# Patient Record
Sex: MALE | Race: WHITE | NOT HISPANIC OR LATINO | Employment: FULL TIME | URBAN - METROPOLITAN AREA
[De-identification: names, ages, dates, MRNs, and addresses within clinical notes are randomized per-mention and may not be internally consistent; named-entity substitution may affect disease eponyms.]

---

## 2023-01-06 ENCOUNTER — TELEPHONE (OUTPATIENT)
Dept: FAMILY MEDICINE | Facility: CLINIC | Age: 63
End: 2023-01-06
Payer: COMMERCIAL

## 2023-01-06 NOTE — TELEPHONE ENCOUNTER
----- Message from Dagmar Hodge LPN sent at 1/5/2023 12:45 PM CST -----  Contact: Patient's Spouse    ----- Message -----  From: Sybil Acuna  Sent: 1/4/2023   9:24 AM CST  To: [facility] Staff    Type:  Sooner Appointment Request    Caller is requesting a sooner appointment.  Caller declined first available appointment listed below.  Caller will not accept being placed on the waitlist and is requesting a message be sent to doctor.    Name of Caller: Patient  When is the first available appointment? N/a  Symptoms: n/a  Best Call Back Number: [phone]   Additional Information: Please contact patient to schedule a New Patient appointment for patient and Spouse  MRN# [id number]- Trang

## 2023-01-11 ENCOUNTER — OFFICE VISIT (OUTPATIENT)
Dept: FAMILY MEDICINE | Facility: CLINIC | Age: 63
End: 2023-01-11
Payer: COMMERCIAL

## 2023-01-11 VITALS
HEART RATE: 86 BPM | RESPIRATION RATE: 18 BRPM | WEIGHT: 200.06 LBS | DIASTOLIC BLOOD PRESSURE: 72 MMHG | HEIGHT: 70 IN | BODY MASS INDEX: 28.64 KG/M2 | OXYGEN SATURATION: 96 % | SYSTOLIC BLOOD PRESSURE: 124 MMHG

## 2023-01-11 DIAGNOSIS — N40.1 BPH ASSOCIATED WITH NOCTURIA: ICD-10-CM

## 2023-01-11 DIAGNOSIS — Z12.83 SKIN CANCER SCREENING: ICD-10-CM

## 2023-01-11 DIAGNOSIS — Z86.19 HISTORY OF HELICOBACTER PYLORI INFECTION: ICD-10-CM

## 2023-01-11 DIAGNOSIS — Z12.11 ENCOUNTER FOR SCREENING COLONOSCOPY: ICD-10-CM

## 2023-01-11 DIAGNOSIS — Z13.1 ENCOUNTER FOR SCREENING FOR DIABETES MELLITUS: ICD-10-CM

## 2023-01-11 DIAGNOSIS — Z11.4 ENCOUNTER FOR SCREENING FOR HUMAN IMMUNODEFICIENCY VIRUS (HIV): ICD-10-CM

## 2023-01-11 DIAGNOSIS — N52.9 ERECTILE DYSFUNCTION, UNSPECIFIED ERECTILE DYSFUNCTION TYPE: ICD-10-CM

## 2023-01-11 DIAGNOSIS — R35.1 BPH ASSOCIATED WITH NOCTURIA: ICD-10-CM

## 2023-01-11 DIAGNOSIS — Z00.00 PREVENTATIVE HEALTH CARE: Primary | ICD-10-CM

## 2023-01-11 DIAGNOSIS — Z11.59 ENCOUNTER FOR HEPATITIS C SCREENING TEST FOR LOW RISK PATIENT: ICD-10-CM

## 2023-01-11 DIAGNOSIS — Z98.890 HISTORY OF SINUS SURGERY: ICD-10-CM

## 2023-01-11 DIAGNOSIS — E78.2 MIXED HYPERLIPIDEMIA: ICD-10-CM

## 2023-01-11 PROCEDURE — 99204 PR OFFICE/OUTPT VISIT, NEW, LEVL IV, 45-59 MIN: ICD-10-PCS | Mod: S$GLB,,, | Performed by: STUDENT IN AN ORGANIZED HEALTH CARE EDUCATION/TRAINING PROGRAM

## 2023-01-11 PROCEDURE — 1160F PR REVIEW ALL MEDS BY PRESCRIBER/CLIN PHARMACIST DOCUMENTED: ICD-10-PCS | Mod: CPTII,S$GLB,, | Performed by: STUDENT IN AN ORGANIZED HEALTH CARE EDUCATION/TRAINING PROGRAM

## 2023-01-11 PROCEDURE — 1159F MED LIST DOCD IN RCRD: CPT | Mod: CPTII,S$GLB,, | Performed by: STUDENT IN AN ORGANIZED HEALTH CARE EDUCATION/TRAINING PROGRAM

## 2023-01-11 PROCEDURE — 3008F PR BODY MASS INDEX (BMI) DOCUMENTED: ICD-10-PCS | Mod: CPTII,S$GLB,, | Performed by: STUDENT IN AN ORGANIZED HEALTH CARE EDUCATION/TRAINING PROGRAM

## 2023-01-11 PROCEDURE — 1159F PR MEDICATION LIST DOCUMENTED IN MEDICAL RECORD: ICD-10-PCS | Mod: CPTII,S$GLB,, | Performed by: STUDENT IN AN ORGANIZED HEALTH CARE EDUCATION/TRAINING PROGRAM

## 2023-01-11 PROCEDURE — 99999 PR PBB SHADOW E&M-EST. PATIENT-LVL IV: ICD-10-PCS | Mod: PBBFAC,,, | Performed by: STUDENT IN AN ORGANIZED HEALTH CARE EDUCATION/TRAINING PROGRAM

## 2023-01-11 PROCEDURE — 1160F RVW MEDS BY RX/DR IN RCRD: CPT | Mod: CPTII,S$GLB,, | Performed by: STUDENT IN AN ORGANIZED HEALTH CARE EDUCATION/TRAINING PROGRAM

## 2023-01-11 PROCEDURE — 3078F DIAST BP <80 MM HG: CPT | Mod: CPTII,S$GLB,, | Performed by: STUDENT IN AN ORGANIZED HEALTH CARE EDUCATION/TRAINING PROGRAM

## 2023-01-11 PROCEDURE — 3008F BODY MASS INDEX DOCD: CPT | Mod: CPTII,S$GLB,, | Performed by: STUDENT IN AN ORGANIZED HEALTH CARE EDUCATION/TRAINING PROGRAM

## 2023-01-11 PROCEDURE — 3074F SYST BP LT 130 MM HG: CPT | Mod: CPTII,S$GLB,, | Performed by: STUDENT IN AN ORGANIZED HEALTH CARE EDUCATION/TRAINING PROGRAM

## 2023-01-11 PROCEDURE — 3078F PR MOST RECENT DIASTOLIC BLOOD PRESSURE < 80 MM HG: ICD-10-PCS | Mod: CPTII,S$GLB,, | Performed by: STUDENT IN AN ORGANIZED HEALTH CARE EDUCATION/TRAINING PROGRAM

## 2023-01-11 PROCEDURE — 3074F PR MOST RECENT SYSTOLIC BLOOD PRESSURE < 130 MM HG: ICD-10-PCS | Mod: CPTII,S$GLB,, | Performed by: STUDENT IN AN ORGANIZED HEALTH CARE EDUCATION/TRAINING PROGRAM

## 2023-01-11 PROCEDURE — 99999 PR PBB SHADOW E&M-EST. PATIENT-LVL IV: CPT | Mod: PBBFAC,,, | Performed by: STUDENT IN AN ORGANIZED HEALTH CARE EDUCATION/TRAINING PROGRAM

## 2023-01-11 PROCEDURE — 99204 OFFICE O/P NEW MOD 45 MIN: CPT | Mod: S$GLB,,, | Performed by: STUDENT IN AN ORGANIZED HEALTH CARE EDUCATION/TRAINING PROGRAM

## 2023-01-11 RX ORDER — TADALAFIL 5 MG/1
5 TABLET ORAL DAILY PRN
Qty: 90 TABLET | Refills: 0 | Status: SHIPPED | OUTPATIENT
Start: 2023-01-11 | End: 2023-04-11

## 2023-01-11 RX ORDER — PANTOPRAZOLE SODIUM 40 MG/1
40 TABLET, DELAYED RELEASE ORAL DAILY
Qty: 90 TABLET | Refills: 3 | Status: SHIPPED | OUTPATIENT
Start: 2023-01-11 | End: 2023-03-07 | Stop reason: ALTCHOICE

## 2023-01-11 RX ORDER — TADALAFIL 10 MG/1
10 TABLET ORAL DAILY PRN
COMMUNITY
End: 2023-01-11 | Stop reason: SDUPTHER

## 2023-01-11 RX ORDER — PANTOPRAZOLE SODIUM 40 MG/1
40 TABLET, DELAYED RELEASE ORAL DAILY
COMMUNITY
End: 2023-01-11 | Stop reason: SDUPTHER

## 2023-01-11 RX ORDER — ATORVASTATIN CALCIUM 20 MG/1
TABLET, FILM COATED ORAL
COMMUNITY
End: 2023-01-11 | Stop reason: SDUPTHER

## 2023-01-11 RX ORDER — ATORVASTATIN CALCIUM 20 MG/1
20 TABLET, FILM COATED ORAL DAILY
Qty: 90 TABLET | Refills: 3 | Status: SHIPPED | OUTPATIENT
Start: 2023-01-11 | End: 2023-03-02

## 2023-01-11 NOTE — PROGRESS NOTES
Subjective:      Patient ID: Osbaldo Brewer is a 62 y.o. male    Chief Complaint   Patient presents with    Establish Care    Annual Exam     HPI  62 y.o. male with a PMHx as documented below presents to clinic today for the following:    Annual exam to establish [facility], recently moved to [facility] from [place]. Previously following with PCP, GI, Cardiology, ENT, and Dermatology in [place] - records not available at this time (pt to bring information to fill out records request at later date).      Cardiology:   - Hx of high cholesterol for which he takes Lipitor 20 mg daily     GI  - Hx of H. Pylori for which he takes Protonix 40 mg daily  - Reports being due for colonoscopy in 2025    ENT:   - Hx of sinus surgery and recurrent sinus infections  - May need further procedural intervention per previous ENT, needs to continue care here    Dermatology:   - Previously followed w/ Derm in [place] for routine skin checks, would like to continue in [place]    BPH/ED:  - Cialis 5 mg qhs    Review of Systems   Constitutional:  Negative for chills and fever.   Respiratory:  Negative for shortness of breath.    Cardiovascular:  Negative for chest pain.   Gastrointestinal:  Negative for abdominal pain, constipation, diarrhea, nausea and vomiting.   Genitourinary:  Negative for dysuria.   Musculoskeletal:  Negative for back pain and neck pain.   Skin:  Negative for rash.   Neurological:  Negative for dizziness, sensory change, weakness and headaches.   Psychiatric/Behavioral:  Negative for depression. The patient is not nervous/anxious.      Past Medical History:   Diagnosis Date    BPH associated with nocturia 1/11/2023    Erectile dysfunction 1/11/2023    History of Helicobacter pylori infection 1/11/2023    History of sinus surgery 1/11/2023    Mixed hyperlipidemia 8/29/2014     History reviewed. No pertinent surgical history.    Review of patient's allergies indicates:  No Known Allergies    History reviewed.  "No pertinent family history.     Currently on File with [facility]:   Most Recent Immunizations   Administered Date(s) Administered    Influenza - Quadrivalent - MDCK - PF 11/01/2022     Objective:      Vitals:    01/11/23 1458   BP: 124/72   BP Location: Left arm   Patient Position: Sitting   Pulse: 86   Resp: 18   SpO2: 96%   Weight: 90.8 kg (200 lb 1.1 oz)   Height: 5' 10" (1.778 m)     Physical Exam  Vitals reviewed.   Constitutional:       General: He is not in acute distress.  HENT:      Head: Normocephalic and atraumatic.   Cardiovascular:      Rate and Rhythm: Normal rate.   Pulmonary:      Effort: Pulmonary effort is normal. No respiratory distress.   Neurological:      General: No focal deficit present.      Mental Status: He is alert and oriented to person, place, and time. Mental status is at baseline.      Assessment:       1. Preventative health care    2. Encounter for screening for diabetes mellitus    3. Mixed hyperlipidemia    4. Encounter for hepatitis C screening test for low risk patient    5. Encounter for screening for human immunodeficiency virus (HIV)    6. Encounter for screening colonoscopy    7. History of sinus surgery    8. Skin cancer screening    9. History of Helicobacter pylori infection    10. BPH associated with nocturia    11. Erectile dysfunction, unspecified erectile dysfunction type      Plan:       Osbaldo was seen today for establish care and annual exam.    Diagnoses and all orders for this visit:    Preventative health care  -     CBC Auto Differential; Future  -     Comprehensive Metabolic Panel; Future    Encounter for screening for diabetes mellitus  -     Hemoglobin A1C; Future    Mixed hyperlipidemia  -     Lipid Panel; Future  -     Ambulatory referral/consult to Cardiology; Future  -     atorvastatin (LIPITOR) 20 MG tablet; Take 1 tablet (20 mg total) by mouth once daily.    Encounter for hepatitis C screening test for low risk patient  -     HEPATITIS C " ANTIBODY; Future    Encounter for screening for human immunodeficiency virus (HIV)  -     HIV 1/2 Ag/Ab (4th Gen); Future    Encounter for screening colonoscopy  -     Cancel: Case Request Endoscopy: COLONOSCOPY    History of sinus surgery  -     Ambulatory referral/consult to ENT; Future    Skin cancer screening  -     Ambulatory referral/consult to Dermatology; Future    History of Helicobacter pylori infection  -     Ambulatory referral/consult to Gastroenterology; Future  -     pantoprazole (PROTONIX) 40 MG tablet; Take 1 tablet (40 mg total) by mouth once daily.    BPH associated with nocturia  -     tadalafiL (CIALIS) 5 MG tablet; Take 1 tablet (5 mg total) by mouth daily as needed for Erectile Dysfunction.    Erectile dysfunction, unspecified erectile dysfunction type  -     tadalafiL (CIALIS) 5 MG tablet; Take 1 tablet (5 mg total) by mouth daily as needed for Erectile Dysfunction.         Follow up in about 3 months (around 4/11/2023), or if symptoms worsen or fail to improve.    Miriam Lazo MD  [facility]  Office: [phone]   Fax: [phone]  01/11/2023    Disclaimer: This note was partly generated using dictation software which may occasionally result in transcription errors.

## 2023-01-12 ENCOUNTER — TELEPHONE (OUTPATIENT)
Dept: FAMILY MEDICINE | Facility: CLINIC | Age: 63
End: 2023-01-12
Payer: COMMERCIAL

## 2023-01-17 ENCOUNTER — TELEPHONE (OUTPATIENT)
Dept: FAMILY MEDICINE | Facility: CLINIC | Age: 63
End: 2023-01-17
Payer: COMMERCIAL

## 2023-01-17 NOTE — TELEPHONE ENCOUNTER
----- Message from Jaya Zavaleta sent at 1/17/2023  1:42 PM CST -----  Type:  Patient Requesting Referral    Who Called:  Wife/ Trang  Does the patient already have the specialty appointment scheduled?:  Yes  If yes, what is the date of that appointment?:  03/07/23  Referral to What Specialty:  ENT  Reason for Referral:  Sinus issues  Does the patient want the referral with a specific physician?:  Dr. Neville Navarrete  [facility]  Patient Requesting a Call Back?:  Yes  Best Call Back Number:  [phone]  Additional Information:

## 2023-02-13 ENCOUNTER — DOCUMENTATION ONLY (OUTPATIENT)
Dept: FAMILY MEDICINE | Facility: CLINIC | Age: 63
End: 2023-02-13
Payer: COMMERCIAL

## 2023-02-13 NOTE — PROGRESS NOTES
Received faxed records from [facility] (followed w/ Milka Sheriff MD). Work # [phone], Fax # [phone].    Records to be scanned into chart, pertinent findings summarized below.    Following w/ ENT for recurrent sinusitis and chronic cough. Pt previously on Breo vs Trelegy and Singular (dosage and dates not reported, although noted to be off medications in 2/2022). Evaluated by Endodontist (Dr. Whitfield?) for possible dental source, s/p removal of right upper molar and wisdom teeth x4 with subsequent bone graft with no improvement in symptoms.    - CT sinuses (11/2021) w/ subacute, right maxillary sinusitis.   - S/p image guided right middle meatal antrostomy (7/14/22) without reported complication. Per op report, significant findings include purulent secretions in the right maxillary sinus polypoid edema of the maxillary sinus floor.     Culture results + propionibacterium, treated w/ gent/budesonide rinses BID + 1/2 baby shampoo with improvement. Recommended irrigation 2-3x/day with continuation of budesonide (as of 09/14/2022).       Miriam Lazo MD  [facility]  Office: [phone]   Fax: [phone]  02/13/2023

## 2023-02-28 ENCOUNTER — TELEPHONE (OUTPATIENT)
Dept: DERMATOLOGY | Facility: CLINIC | Age: 63
End: 2023-02-28
Payer: COMMERCIAL

## 2023-02-28 NOTE — TELEPHONE ENCOUNTER
----- Message from Danielle Tuttle sent at 2/28/2023  9:09 AM CST -----  Contact: pt  Type:  Sooner Apoointment Request    Caller is requesting a sooner appointment.  Caller declined first available appointment listed below.  Caller will not accept being placed on the waitlist and is requesting a message be sent to doctor.  Name of Caller:pt wife  When is the first available appointment???  Symptoms:skin issues  Would the patient rather a call back or a response via [facility]? call  Best Call Back Number:[phone] (home)     Additional Information: States pt need to schedule with provider. Also states there is a referral. Please advise thank you

## 2023-02-28 NOTE — TELEPHONE ENCOUNTER
Pt contacted and notified that Dr. Shannon is not taking new pt at this time.  Declined to scheduled with anyone else at this time.

## 2023-03-02 ENCOUNTER — OFFICE VISIT (OUTPATIENT)
Dept: FAMILY MEDICINE | Facility: CLINIC | Age: 63
End: 2023-03-02
Payer: COMMERCIAL

## 2023-03-02 VITALS
SYSTOLIC BLOOD PRESSURE: 114 MMHG | DIASTOLIC BLOOD PRESSURE: 72 MMHG | OXYGEN SATURATION: 98 % | HEART RATE: 68 BPM | HEIGHT: 70 IN | BODY MASS INDEX: 28.33 KG/M2 | WEIGHT: 197.88 LBS

## 2023-03-02 DIAGNOSIS — N40.1 BPH ASSOCIATED WITH NOCTURIA: Chronic | ICD-10-CM

## 2023-03-02 DIAGNOSIS — J30.2 PERENNIAL ALLERGIC RHINITIS WITH SEASONAL VARIATION: ICD-10-CM

## 2023-03-02 DIAGNOSIS — J30.89 PERENNIAL ALLERGIC RHINITIS WITH SEASONAL VARIATION: ICD-10-CM

## 2023-03-02 DIAGNOSIS — J32.9 CHRONIC SINUSITIS, UNSPECIFIED LOCATION: ICD-10-CM

## 2023-03-02 DIAGNOSIS — E66.3 OVERWEIGHT (BMI 25.0-29.9): ICD-10-CM

## 2023-03-02 DIAGNOSIS — R35.1 BPH ASSOCIATED WITH NOCTURIA: Chronic | ICD-10-CM

## 2023-03-02 DIAGNOSIS — Z86.19 HISTORY OF HELICOBACTER PYLORI INFECTION: Chronic | ICD-10-CM

## 2023-03-02 DIAGNOSIS — Z00.00 HEALTHCARE MAINTENANCE: ICD-10-CM

## 2023-03-02 DIAGNOSIS — K21.9 GASTROESOPHAGEAL REFLUX DISEASE, UNSPECIFIED WHETHER ESOPHAGITIS PRESENT: ICD-10-CM

## 2023-03-02 DIAGNOSIS — E78.2 MIXED HYPERLIPIDEMIA: Primary | Chronic | ICD-10-CM

## 2023-03-02 DIAGNOSIS — G47.9 SLEEP DISORDER: ICD-10-CM

## 2023-03-02 DIAGNOSIS — Z83.3 FAMILY HISTORY OF DIABETES MELLITUS (DM): ICD-10-CM

## 2023-03-02 DIAGNOSIS — F41.9 ANXIETY: ICD-10-CM

## 2023-03-02 DIAGNOSIS — Z76.89 ENCOUNTER TO ESTABLISH CARE WITH NEW DOCTOR: ICD-10-CM

## 2023-03-02 DIAGNOSIS — N52.9 ERECTILE DYSFUNCTION, UNSPECIFIED ERECTILE DYSFUNCTION TYPE: Chronic | ICD-10-CM

## 2023-03-02 DIAGNOSIS — Z98.890 HISTORY OF SINUS SURGERY: Chronic | ICD-10-CM

## 2023-03-02 DIAGNOSIS — Z12.5 PROSTATE CANCER SCREENING: ICD-10-CM

## 2023-03-02 PROCEDURE — 99215 PR OFFICE/OUTPT VISIT, EST, LEVL V, 40-54 MIN: ICD-10-PCS | Mod: S$GLB,,, | Performed by: INTERNAL MEDICINE

## 2023-03-02 PROCEDURE — 3078F PR MOST RECENT DIASTOLIC BLOOD PRESSURE < 80 MM HG: ICD-10-PCS | Mod: CPTII,S$GLB,, | Performed by: INTERNAL MEDICINE

## 2023-03-02 PROCEDURE — 3008F PR BODY MASS INDEX (BMI) DOCUMENTED: ICD-10-PCS | Mod: CPTII,S$GLB,, | Performed by: INTERNAL MEDICINE

## 2023-03-02 PROCEDURE — 3078F DIAST BP <80 MM HG: CPT | Mod: CPTII,S$GLB,, | Performed by: INTERNAL MEDICINE

## 2023-03-02 PROCEDURE — 99999 PR PBB SHADOW E&M-EST. PATIENT-LVL IV: CPT | Mod: PBBFAC,,, | Performed by: INTERNAL MEDICINE

## 2023-03-02 PROCEDURE — 99215 OFFICE O/P EST HI 40 MIN: CPT | Mod: S$GLB,,, | Performed by: INTERNAL MEDICINE

## 2023-03-02 PROCEDURE — 3074F SYST BP LT 130 MM HG: CPT | Mod: CPTII,S$GLB,, | Performed by: INTERNAL MEDICINE

## 2023-03-02 PROCEDURE — 3008F BODY MASS INDEX DOCD: CPT | Mod: CPTII,S$GLB,, | Performed by: INTERNAL MEDICINE

## 2023-03-02 PROCEDURE — 3044F PR MOST RECENT HEMOGLOBIN A1C LEVEL <7.0%: ICD-10-PCS | Mod: CPTII,S$GLB,, | Performed by: INTERNAL MEDICINE

## 2023-03-02 PROCEDURE — 3044F HG A1C LEVEL LT 7.0%: CPT | Mod: CPTII,S$GLB,, | Performed by: INTERNAL MEDICINE

## 2023-03-02 PROCEDURE — 99417 PR PROLONGED SVC, OUTPT, W/WO DIRECT PT CONTACT,  EA ADDTL 15 MIN: ICD-10-PCS | Mod: S$GLB,,, | Performed by: INTERNAL MEDICINE

## 2023-03-02 PROCEDURE — 99999 PR PBB SHADOW E&M-EST. PATIENT-LVL IV: ICD-10-PCS | Mod: PBBFAC,,, | Performed by: INTERNAL MEDICINE

## 2023-03-02 PROCEDURE — 3074F PR MOST RECENT SYSTOLIC BLOOD PRESSURE < 130 MM HG: ICD-10-PCS | Mod: CPTII,S$GLB,, | Performed by: INTERNAL MEDICINE

## 2023-03-02 PROCEDURE — 99417 PROLNG OP E/M EACH 15 MIN: CPT | Mod: S$GLB,,, | Performed by: INTERNAL MEDICINE

## 2023-03-02 RX ORDER — TRAZODONE HYDROCHLORIDE 50 MG/1
TABLET ORAL
Qty: 30 TABLET | Refills: 2 | Status: SHIPPED | OUTPATIENT
Start: 2023-03-02 | End: 2023-03-10

## 2023-03-02 RX ORDER — CEFDINIR 300 MG/1
300 CAPSULE ORAL EVERY 12 HOURS
COMMUNITY
Start: 2023-02-23 | End: 2023-03-07 | Stop reason: ALTCHOICE

## 2023-03-02 RX ORDER — ATORVASTATIN CALCIUM 40 MG/1
40 TABLET, FILM COATED ORAL DAILY
COMMUNITY
End: 2023-07-10 | Stop reason: SDUPTHER

## 2023-03-02 RX ORDER — BUSPIRONE HYDROCHLORIDE 5 MG/1
TABLET ORAL
Qty: 30 TABLET | Refills: 1 | Status: SHIPPED | OUTPATIENT
Start: 2023-03-02 | End: 2023-03-10

## 2023-03-02 NOTE — PROGRESS NOTES
Subjective:       Patient ID: Osbaldo Brewer is a 62 y.o. male.    Chief Complaint: Establish Care    HPI:  Patient here today to establish care with me as his new PCP at [facility]. .PMH and surgical hx delineated and noted; SMH/FMH also delineated and noted. ROS obtained at length prior to physical exam being performed.  Topics below were discussed with patient today at length.  Mixed hyperlipidemia: Maintain low fat high fiber diet, exercise regularly. Weight reduction where indicated.  Cont atorvastatin 40 mg a day.   -     Comprehensive Metabolic Panel; Future; Expected date: 03/02/2023  -     Lipid Panel; Future; Expected date: 03/02/2023    Overweight (BMI 25.0-29.9); Caloric restriction w regular exercise and weight reduction.   -     Comprehensive Metabolic Panel; Future; Expected date: 03/02/2023  -     T4, Free; Future; Expected date: 03/02/2023  -     TSH; Future; Expected date: 03/02/2023    Family history of diabetes mellitus (DM)  -     Comprehensive Metabolic Panel; Future; Expected date: 03/02/2023  -     Hemoglobin A1C; Future; Expected date: 03/02/2023    Gastroesophageal reflux disease, unspecified whether esophagitis present; can use pepcid 20 mg every night for reflux if needed.   -     CBC Auto Differential; Future; Expected date: 03/02/2023    BPH associated with nocturia: on tadalfil.   -     Cancel: PSA, Screening; Future; Expected date: 05/01/2023    Erectile dysfunction, unspecified erectile dysfunction type; on tadalafil.     History of Helicobacter pylori infection; tx mid year cleared 2022; needed 2 courses dr De Jesus  -     CBC Auto Differential; Future; Expected date: 03/02/2023D    Encounter to establish care with new doctor    Healthcare maintenance  -     Comprehensive Metabolic Panel; Future; Expected date: 03/02/2023  -     CBC Auto Differential; Future; Expected date: 03/02/2023  -     Lipid Panel; Future; Expected date: 03/02/2023  -     T4, Free;  Future; Expected date: 03/02/2023  -     TSH; Future; Expected date: 03/02/2023  -     Cancel: PSA, Screening; Future; Expected date: 05/01/2023  -     Hemoglobin A1C; Future; Expected date: 03/02/2023    Prostate cancer screening; PSA after 5/1/23  -     Cancel: PSA, Screening; Future; Expected date: 05/01/2023    History of sinus surgery; with extraction of teeth included penetration of bone which then required bone grafting  -     IGM; Future; Expected date: 03/02/2023  -     IGG; Future; Expected date: 03/02/2023  -     IGA; Future; Expected date: 03/02/2023  -     IGE; Future; Expected date: 03/02/2023  -     IGG 1, 2, 3, AND 4; Future; Expected date: 03/02/2023    Chronic sinusitis, unspecified location; ENT consult to Dr. Navarrete; has apt to see ENT Dr Navarrete 3/7/23; case discussed with him by phone; has included continuous antibiotic lavage for 4 months at 1 time and then later again for another month.  Imaging of his sinuses including CT and MRI by patient's account shows extensive right-sided sinusitis.  Initially started with teeth being removed and penetrating through sinus bone, which later required bone grafting surgery for repair.   -     IGM; Future; Expected date: 03/02/2023  -     IGG; Future; Expected date: 03/02/2023  -     IGA; Future; Expected date: 03/02/2023  -     IGE; Future; Expected date: 03/02/2023  -     IGG 1, 2, 3, AND 4; Future; Expected date: 03/02/2023    Perennial allergic rhinitis with seasonal variation; zyrtec/allegra; nasacort as needed.   -     IGM; Future; Expected date: 03/02/2023  -     IGG; Future; Expected date: 03/02/2023  -     IGA; Future; Expected date: 03/02/2023  -     IGE; Future; Expected date: 03/02/2023  -     IGG 1, 2, 3, AND 4; Future; Expected date: 03/02/2023    Anxiety: Limit caffeine intake with stress reduction and regular exercise as tolerated.   -     busPIRone (BUSPAR) 5 MG Tab; Take buspar 5-7.5 mg po TID as needed for anxiety or sleep.  Dispense:  30 tablet; Refill: 1  -     traZODone (DESYREL) 50 MG tablet; Take 50 mg po every evening for sleep as needed; 1 hr later if needed can take buspar  Dispense: 30 tablet; Refill: 2    Sleep disorder: Limit caffeine intake; limit blue light exposure after mid day from electronic equipment.  Exercise after mid afternoon  -     busPIRone (BUSPAR) 5 MG Tab; Take buspar 5-7.5 mg po TID as needed for anxiety or sleep.  Dispense: 30 tablet; Refill: 1  -     traZODone (DESYREL) 50 MG tablet; Take 50 mg po every evening for sleep as needed; 1 hr later if needed can take buspar  Dispense: 30 tablet; Refill: 2    Total time:  1255 through 2:20 p.m. greater than 50% of time spent in discussion counseling and review.  Extensive time spent going over his past medical history course.  And plan of care.  Various other topics discussed including plan of care for those as well.  Medications reviewed and addressed.  Labs ordered for follow-up discussed with patient at length.  ENT consult placed to Dr. Navarrete.  Case discussed with him by phone.      Review of Systems   Constitutional:  Negative for appetite change and unexpected weight change.   HENT:  Negative for congestion, postnasal drip, rhinorrhea and sinus pressure.         Denies seasonal allergies, or perennial allergies   Eyes:  Negative for discharge and itching.   Respiratory:  Negative for cough, chest tightness, shortness of breath and wheezing.    Cardiovascular:  Negative for chest pain, palpitations and leg swelling.   Gastrointestinal:  Negative for abdominal pain, blood in stool, constipation, diarrhea, nausea and vomiting.   Endocrine: Negative for polydipsia, polyphagia and polyuria.   Genitourinary:  Negative for dysuria and hematuria.   Musculoskeletal:  Negative for arthralgias and myalgias.   Skin:  Negative for rash.   Allergic/Immunologic: Negative for environmental allergies and food allergies.   Neurological:  Negative for tremors, seizures and headaches.  "  Hematological:  Negative for adenopathy. Does not bruise/bleed easily.   Psychiatric/Behavioral:  Positive for sleep disturbance. Negative for dysphoric mood. The patient is nervous/anxious.         Denies anxiety or depression.    Objective:        Vitals:    03/02/23 1224   BP: 114/72   Pulse: 68   SpO2: 98%   Weight: 89.8 kg (197 lb 13.8 oz)   Height: 5' 10" (1.778 m)       BMI Readings from Last 3 Encounters:   03/02/23 28.39 kg/m²   01/11/23 28.71 kg/m²        Wt Readings from Last 3 Encounters:   03/02/23 1224 89.8 kg (197 lb 13.8 oz)   01/11/23 1458 90.8 kg (200 lb 1.1 oz)        BP Readings from Last 3 Encounters:   03/02/23 114/72   01/11/23 124/72        There are no preventive care reminders to display for this patient.     Health Maintenance Due   Topic Date Due    Hepatitis C Screening  Never done    Lipid Panel  Never done    COVID-19 Vaccine (1) Never done    HIV Screening  Never done    TETANUS VACCINE  Never done    Hemoglobin A1c (Diabetic Prevention Screening)  Never done    Colorectal Cancer Screening  Never done    Shingles Vaccine (1 of 2) Never done         Past Medical History:   Diagnosis Date    BPH associated with nocturia 1/11/2023    Erectile dysfunction 1/11/2023    History of Helicobacter pylori infection 1/11/2023    History of sinus surgery 1/11/2023    Mixed hyperlipidemia 8/29/2014       No past surgical history on file.    Social History     Tobacco Use    Smoking status: Never    Smokeless tobacco: Never       No family history on file.    Review of patient's allergies indicates:  No Known Allergies    Current Outpatient Medications on File Prior to Visit   Medication Sig Dispense Refill    atorvastatin (LIPITOR) 40 MG tablet Take 40 mg by mouth once daily.      cefdinir (OMNICEF) 300 MG capsule Take 300 mg by mouth every 12 (twelve) hours.      tadalafiL (CIALIS) 5 MG tablet Take 1 tablet (5 mg total) by mouth daily as needed for Erectile Dysfunction. 90 tablet 0    " [DISCONTINUED] atorvastatin (LIPITOR) 20 MG tablet Take 1 tablet (20 mg total) by mouth once daily. (Patient taking differently: Take 40 mg by mouth once daily.) 90 tablet 3    pantoprazole (PROTONIX) 40 MG tablet Take 1 tablet (40 mg total) by mouth once daily. (Patient not taking: Reported on 3/2/2023) 90 tablet 3     No current facility-administered medications on file prior to visit.       Physical Exam  Vitals reviewed.   Constitutional:       Appearance: He is well-developed.   HENT:      Head: Normocephalic and atraumatic.      Comments: Left TM slight inflamed; R-EC slight tender; sclerotic. Throat slightly inflamed wposterior pharynx inflamed. NM swollen inflamed w clear to yel-white mucus. Right lat infraorbital ridge tender to palp and slight edema as well. No palp submand LN's or cerv LN's appreciated.   Neck:      Thyroid: No thyromegaly.      Vascular: No carotid bruit.   Cardiovascular:      Rate and Rhythm: Normal rate and regular rhythm.      Heart sounds: Normal heart sounds. No murmur heard.    No gallop.   Pulmonary:      Effort: Pulmonary effort is normal. No respiratory distress.      Breath sounds: Normal breath sounds. No wheezing or rales.   Abdominal:      General: Bowel sounds are normal. There is no distension.      Palpations: Abdomen is soft.      Tenderness: There is no abdominal tenderness. There is no guarding or rebound.   Musculoskeletal:         General: Normal range of motion.      Cervical back: Normal range of motion and neck supple.      Right lower leg: No edema.      Left lower leg: No edema.   Lymphadenopathy:      Cervical: No cervical adenopathy.   Skin:     Findings: No rash.   Neurological:      Mental Status: He is alert and oriented to person, place, and time.      Comments: Moves all 4 extremities fine.   Psychiatric:         Behavior: Behavior normal.         Thought Content: Thought content normal.       Assessment:       1. Mixed hyperlipidemia    2. Overweight  (BMI 25.0-29.9)    3. Family history of diabetes mellitus (DM)    4. Gastroesophageal reflux disease, unspecified whether esophagitis present    5. BPH associated with nocturia    6. Erectile dysfunction, unspecified erectile dysfunction type    7. History of Helicobacter pylori infection    8. Encounter to establish care with new doctor    9. Healthcare maintenance    10. Prostate cancer screening    11. History of sinus surgery    12. Chronic sinusitis, unspecified location    13. Perennial allergic rhinitis with seasonal variation    14. Anxiety    15. Sleep disorder          Plan:       Mixed hyperlipidemia: Maintain low fat high fiber diet, exercise regularly. Weight reduction where indicated.  Cont atorvastatin 40 mg a day.   -     Comprehensive Metabolic Panel; Future; Expected date: 03/02/2023  -     Lipid Panel; Future; Expected date: 03/02/2023    Overweight (BMI 25.0-29.9); Caloric restriction w regular exercise and weight reduction.   -     Comprehensive Metabolic Panel; Future; Expected date: 03/02/2023  -     T4, Free; Future; Expected date: 03/02/2023  -     TSH; Future; Expected date: 03/02/2023    Family history of diabetes mellitus (DM)  -     Comprehensive Metabolic Panel; Future; Expected date: 03/02/2023  -     Hemoglobin A1C; Future; Expected date: 03/02/2023    Gastroesophageal reflux disease, unspecified whether esophagitis present; can use pepcid 20 mg every night for reflux if needed.   -     CBC Auto Differential; Future; Expected date: 03/02/2023    BPH associated with nocturia: on tadalfil.   -     Cancel: PSA, Screening; Future; Expected date: 05/01/2023    Erectile dysfunction, unspecified erectile dysfunction type; on tadalafil.     History of Helicobacter pylori infection; tx mid year cleared 2022; needed 2 courses dr De Jesus  -     CBC Auto Differential; Future; Expected date: 03/02/2023D    Encounter to establish care with new doctor    Healthcare maintenance  -     Comprehensive  Metabolic Panel; Future; Expected date: 03/02/2023  -     CBC Auto Differential; Future; Expected date: 03/02/2023  -     Lipid Panel; Future; Expected date: 03/02/2023  -     T4, Free; Future; Expected date: 03/02/2023  -     TSH; Future; Expected date: 03/02/2023  -     Cancel: PSA, Screening; Future; Expected date: 05/01/2023  -     Hemoglobin A1C; Future; Expected date: 03/02/2023    Prostate cancer screening; PSA after 5/1/23  -     Cancel: PSA, Screening; Future; Expected date: 05/01/2023    History of sinus surgery; with extraction of teeth included penetration of bone which then required bone grafting  -     IGM; Future; Expected date: 03/02/2023  -     IGG; Future; Expected date: 03/02/2023  -     IGA; Future; Expected date: 03/02/2023  -     IGE; Future; Expected date: 03/02/2023  -     IGG 1, 2, 3, AND 4; Future; Expected date: 03/02/2023    Chronic sinusitis, unspecified location; ENT consult to Dr. Navarrete; has apt to see ENT Dr Navarrete 3/7/23; case discussed with him by phone; has included continuous antibiotic lavage for 4 months at 1 time and then later again for another month.  Imaging of his sinuses including CT and MRI by patient's account shows extensive right-sided sinusitis.  Initially started with teeth being removed and penetrating through sinus bone, which later required bone grafting surgery for repair.   -     IGM; Future; Expected date: 03/02/2023  -     IGG; Future; Expected date: 03/02/2023  -     IGA; Future; Expected date: 03/02/2023  -     IGE; Future; Expected date: 03/02/2023  -     IGG 1, 2, 3, AND 4; Future; Expected date: 03/02/2023    Perennial allergic rhinitis with seasonal variation; zyrtec/allegra; nasacort as needed.   -     IGM; Future; Expected date: 03/02/2023  -     IGG; Future; Expected date: 03/02/2023  -     IGA; Future; Expected date: 03/02/2023  -     IGE; Future; Expected date: 03/02/2023  -     IGG 1, 2, 3, AND 4; Future; Expected date: 03/02/2023    Anxiety:  Limit caffeine intake with stress reduction and regular exercise as tolerated.   -     busPIRone (BUSPAR) 5 MG Tab; Take buspar 5-7.5 mg po TID as needed for anxiety or sleep.  Dispense: 30 tablet; Refill: 1  -     traZODone (DESYREL) 50 MG tablet; Take 50 mg po every evening for sleep as needed; 1 hr later if needed can take buspar  Dispense: 30 tablet; Refill: 2    Sleep disorder: Limit caffeine intake; limit blue light exposure after mid day from electronic equipment.  Exercise after mid afternoon  -     busPIRone (BUSPAR) 5 MG Tab; Take buspar 5-7.5 mg po TID as needed for anxiety or sleep.  Dispense: 30 tablet; Refill: 1  -     traZODone (DESYREL) 50 MG tablet; Take 50 mg po every evening for sleep as needed; 1 hr later if needed can take buspar  Dispense: 30 tablet; Refill: 2

## 2023-03-02 NOTE — PATIENT INSTRUCTIONS
Mixed hyperlipidemia: Maintain low fat high fiber diet, exercise regularly. Weight reduction where indicated.  Cont atorvastatin 40 mg a day.   -     Comprehensive Metabolic Panel; Future; Expected date: 03/02/2023  -     Lipid Panel; Future; Expected date: 03/02/2023    Overweight (BMI 25.0-29.9); Caloric restriction w regular exercise and weight reduction.   -     Comprehensive Metabolic Panel; Future; Expected date: 03/02/2023  -     T4, Free; Future; Expected date: 03/02/2023  -     TSH; Future; Expected date: 03/02/2023    Family history of diabetes mellitus (DM)  -     Comprehensive Metabolic Panel; Future; Expected date: 03/02/2023  -     Hemoglobin A1C; Future; Expected date: 03/02/2023    Gastroesophageal reflux disease, unspecified whether esophagitis present; can use pepcid 20 mg every night for reflux if needed.   -     CBC Auto Differential; Future; Expected date: 03/02/2023    BPH associated with nocturia: on tadalfil.   -     Cancel: PSA, Screening; Future; Expected date: 05/01/2023    Erectile dysfunction, unspecified erectile dysfunction type; on tadalafil.     History of Helicobacter pylori infection; tx mid year cleared 2022; needed 2 courses dr De Jesus  -     CBC Auto Differential; Future; Expected date: 03/02/2023D    Encounter to establish care with new doctor    Healthcare maintenance  -     Comprehensive Metabolic Panel; Future; Expected date: 03/02/2023  -     CBC Auto Differential; Future; Expected date: 03/02/2023  -     Lipid Panel; Future; Expected date: 03/02/2023  -     T4, Free; Future; Expected date: 03/02/2023  -     TSH; Future; Expected date: 03/02/2023  -     Cancel: PSA, Screening; Future; Expected date: 05/01/2023  -     Hemoglobin A1C; Future; Expected date: 03/02/2023    Prostate cancer screening; PSA after 5/1/23  -     Cancel: PSA, Screening; Future; Expected date: 05/01/2023    History of sinus surgery  -     IGM; Future; Expected date: 03/02/2023  -     IGG; Future;  Expected date: 03/02/2023  -     IGA; Future; Expected date: 03/02/2023  -     IGE; Future; Expected date: 03/02/2023  -     IGG 1, 2, 3, AND 4; Future; Expected date: 03/02/2023    Chronic sinusitis, unspecified location; has apt to see ENT Dr Navarrete 3/7/23  -     IGM; Future; Expected date: 03/02/2023  -     IGG; Future; Expected date: 03/02/2023  -     IGA; Future; Expected date: 03/02/2023  -     IGE; Future; Expected date: 03/02/2023  -     IGG 1, 2, 3, AND 4; Future; Expected date: 03/02/2023    Perennial allergic rhinitis with seasonal variation; zyrtec/allegra; nasacort as needed.   -     IGM; Future; Expected date: 03/02/2023  -     IGG; Future; Expected date: 03/02/2023  -     IGA; Future; Expected date: 03/02/2023  -     IGE; Future; Expected date: 03/02/2023  -     IGG 1, 2, 3, AND 4; Future; Expected date: 03/02/2023    Anxiety  -     busPIRone (BUSPAR) 5 MG Tab; Take buspar 5-7.5 mg po TID as needed for anxiety or sleep.  Dispense: 30 tablet; Refill: 1  -     traZODone (DESYREL) 50 MG tablet; Take 50 mg po every evening for sleep as needed; 1 hr later if needed can take buspar  Dispense: 30 tablet; Refill: 2    Sleep disorder  -     busPIRone (BUSPAR) 5 MG Tab; Take buspar 5-7.5 mg po TID as needed for anxiety or sleep.  Dispense: 30 tablet; Refill: 1  -     traZODone (DESYREL) 50 MG tablet; Take 50 mg po every evening for sleep as needed; 1 hr later if needed can take buspar  Dispense: 30 tablet; Refill: 2

## 2023-03-03 ENCOUNTER — LAB VISIT (OUTPATIENT)
Dept: LAB | Facility: HOSPITAL | Age: 63
End: 2023-03-03
Attending: INTERNAL MEDICINE
Payer: COMMERCIAL

## 2023-03-03 DIAGNOSIS — J30.89 PERENNIAL ALLERGIC RHINITIS WITH SEASONAL VARIATION: ICD-10-CM

## 2023-03-03 DIAGNOSIS — Z00.00 HEALTHCARE MAINTENANCE: ICD-10-CM

## 2023-03-03 DIAGNOSIS — J30.2 PERENNIAL ALLERGIC RHINITIS WITH SEASONAL VARIATION: ICD-10-CM

## 2023-03-03 DIAGNOSIS — E66.3 OVERWEIGHT (BMI 25.0-29.9): ICD-10-CM

## 2023-03-03 DIAGNOSIS — K21.9 GASTROESOPHAGEAL REFLUX DISEASE, UNSPECIFIED WHETHER ESOPHAGITIS PRESENT: ICD-10-CM

## 2023-03-03 DIAGNOSIS — E78.2 MIXED HYPERLIPIDEMIA: Chronic | ICD-10-CM

## 2023-03-03 DIAGNOSIS — Z83.3 FAMILY HISTORY OF DIABETES MELLITUS (DM): ICD-10-CM

## 2023-03-03 DIAGNOSIS — J32.9 CHRONIC SINUSITIS, UNSPECIFIED LOCATION: ICD-10-CM

## 2023-03-03 DIAGNOSIS — Z98.890 HISTORY OF SINUS SURGERY: Chronic | ICD-10-CM

## 2023-03-03 DIAGNOSIS — Z86.19 HISTORY OF HELICOBACTER PYLORI INFECTION: Chronic | ICD-10-CM

## 2023-03-03 LAB
ESTIMATED AVG GLUCOSE: 111 MG/DL (ref 68–131)
HBA1C MFR BLD: 5.5 % (ref 4–5.6)
IGE SERPL-ACNC: <35 IU/ML (ref 0–100)

## 2023-03-03 PROCEDURE — 84439 ASSAY OF FREE THYROXINE: CPT | Performed by: INTERNAL MEDICINE

## 2023-03-03 PROCEDURE — 82785 ASSAY OF IGE: CPT | Performed by: INTERNAL MEDICINE

## 2023-03-03 PROCEDURE — 84443 ASSAY THYROID STIM HORMONE: CPT | Performed by: INTERNAL MEDICINE

## 2023-03-03 PROCEDURE — 85025 COMPLETE CBC W/AUTO DIFF WBC: CPT | Performed by: INTERNAL MEDICINE

## 2023-03-03 PROCEDURE — 82784 ASSAY IGA/IGD/IGG/IGM EACH: CPT | Mod: 59 | Performed by: INTERNAL MEDICINE

## 2023-03-03 PROCEDURE — 80061 LIPID PANEL: CPT | Performed by: INTERNAL MEDICINE

## 2023-03-03 PROCEDURE — 36415 COLL VENOUS BLD VENIPUNCTURE: CPT | Mod: PN | Performed by: INTERNAL MEDICINE

## 2023-03-03 PROCEDURE — 82784 ASSAY IGA/IGD/IGG/IGM EACH: CPT | Performed by: INTERNAL MEDICINE

## 2023-03-03 PROCEDURE — 82787 IGG 1 2 3 OR 4 EACH: CPT | Mod: 59 | Performed by: INTERNAL MEDICINE

## 2023-03-03 PROCEDURE — 83036 HEMOGLOBIN GLYCOSYLATED A1C: CPT | Performed by: INTERNAL MEDICINE

## 2023-03-03 PROCEDURE — 80053 COMPREHEN METABOLIC PANEL: CPT | Performed by: INTERNAL MEDICINE

## 2023-03-04 LAB
ALBUMIN SERPL BCP-MCNC: 4.2 G/DL (ref 3.5–5.2)
ALP SERPL-CCNC: 72 U/L (ref 55–135)
ALT SERPL W/O P-5'-P-CCNC: 26 U/L (ref 10–44)
ANION GAP SERPL CALC-SCNC: 9 MMOL/L (ref 8–16)
AST SERPL-CCNC: 23 U/L (ref 10–40)
BASOPHILS # BLD AUTO: 0.04 K/UL (ref 0–0.2)
BASOPHILS NFR BLD: 0.5 % (ref 0–1.9)
BILIRUB SERPL-MCNC: 0.4 MG/DL (ref 0.1–1)
BUN SERPL-MCNC: 15 MG/DL (ref 8–23)
CALCIUM SERPL-MCNC: 9.7 MG/DL (ref 8.7–10.5)
CHLORIDE SERPL-SCNC: 109 MMOL/L (ref 95–110)
CHOLEST SERPL-MCNC: 160 MG/DL (ref 120–199)
CHOLEST/HDLC SERPL: 3 {RATIO} (ref 2–5)
CO2 SERPL-SCNC: 23 MMOL/L (ref 23–29)
CREAT SERPL-MCNC: 1 MG/DL (ref 0.5–1.4)
DIFFERENTIAL METHOD: NORMAL
EOSINOPHIL # BLD AUTO: 0.1 K/UL (ref 0–0.5)
EOSINOPHIL NFR BLD: 1.9 % (ref 0–8)
ERYTHROCYTE [DISTWIDTH] IN BLOOD BY AUTOMATED COUNT: 12.9 % (ref 11.5–14.5)
EST. GFR  (NO RACE VARIABLE): >60 ML/MIN/1.73 M^2
GLUCOSE SERPL-MCNC: 88 MG/DL (ref 70–110)
HCT VFR BLD AUTO: 43.7 % (ref 40–54)
HDLC SERPL-MCNC: 53 MG/DL (ref 40–75)
HDLC SERPL: 33.1 % (ref 20–50)
HGB BLD-MCNC: 14.2 G/DL (ref 14–18)
IGA SERPL-MCNC: 153 MG/DL (ref 40–350)
IGG SERPL-MCNC: 1050 MG/DL (ref 650–1600)
IGM SERPL-MCNC: 42 MG/DL (ref 50–300)
IMM GRANULOCYTES # BLD AUTO: 0.02 K/UL (ref 0–0.04)
IMM GRANULOCYTES NFR BLD AUTO: 0.3 % (ref 0–0.5)
LDLC SERPL CALC-MCNC: 96 MG/DL (ref 63–159)
LYMPHOCYTES # BLD AUTO: 2.2 K/UL (ref 1–4.8)
LYMPHOCYTES NFR BLD: 29.2 % (ref 18–48)
MCH RBC QN AUTO: 29.8 PG (ref 27–31)
MCHC RBC AUTO-ENTMCNC: 32.5 G/DL (ref 32–36)
MCV RBC AUTO: 92 FL (ref 82–98)
MONOCYTES # BLD AUTO: 0.6 K/UL (ref 0.3–1)
MONOCYTES NFR BLD: 8.2 % (ref 4–15)
NEUTROPHILS # BLD AUTO: 4.5 K/UL (ref 1.8–7.7)
NEUTROPHILS NFR BLD: 59.9 % (ref 38–73)
NONHDLC SERPL-MCNC: 107 MG/DL
NRBC BLD-RTO: 0 /100 WBC
PLATELET # BLD AUTO: 236 K/UL (ref 150–450)
PMV BLD AUTO: 10.6 FL (ref 9.2–12.9)
POTASSIUM SERPL-SCNC: 4.3 MMOL/L (ref 3.5–5.1)
PROT SERPL-MCNC: 7.2 G/DL (ref 6–8.4)
RBC # BLD AUTO: 4.76 M/UL (ref 4.6–6.2)
SODIUM SERPL-SCNC: 141 MMOL/L (ref 136–145)
T4 FREE SERPL-MCNC: 0.84 NG/DL (ref 0.71–1.51)
TRIGL SERPL-MCNC: 55 MG/DL (ref 30–150)
TSH SERPL DL<=0.005 MIU/L-ACNC: 4.28 UIU/ML (ref 0.4–4)
WBC # BLD AUTO: 7.53 K/UL (ref 3.9–12.7)

## 2023-03-06 LAB
IGG1 SER-MCNC: 546 MG/DL (ref 382–929)
IGG2 SER-MCNC: 235 MG/DL (ref 242–700)
IGG3 SER-MCNC: 22 MG/DL (ref 22–176)
IGG4 SER-MCNC: 112 MG/DL (ref 4–86)

## 2023-03-07 ENCOUNTER — OFFICE VISIT (OUTPATIENT)
Dept: OTOLARYNGOLOGY | Facility: CLINIC | Age: 63
End: 2023-03-07
Payer: COMMERCIAL

## 2023-03-07 ENCOUNTER — LAB VISIT (OUTPATIENT)
Dept: LAB | Facility: HOSPITAL | Age: 63
End: 2023-03-07
Attending: OTOLARYNGOLOGY
Payer: COMMERCIAL

## 2023-03-07 VITALS — HEIGHT: 70 IN | WEIGHT: 200.81 LBS | BODY MASS INDEX: 28.75 KG/M2

## 2023-03-07 DIAGNOSIS — J32.0 CHRONIC MAXILLARY SINUSITIS: ICD-10-CM

## 2023-03-07 DIAGNOSIS — J34.2 NASAL SEPTAL DEVIATION: ICD-10-CM

## 2023-03-07 DIAGNOSIS — J32.0 CHRONIC MAXILLARY SINUSITIS: Primary | ICD-10-CM

## 2023-03-07 DIAGNOSIS — Z98.890 HISTORY OF SINUS SURGERY: ICD-10-CM

## 2023-03-07 DIAGNOSIS — J32.9 RECURRENT SINUS INFECTIONS: ICD-10-CM

## 2023-03-07 PROCEDURE — 99204 PR OFFICE/OUTPT VISIT, NEW, LEVL IV, 45-59 MIN: ICD-10-PCS | Mod: 25,S$GLB,, | Performed by: OTOLARYNGOLOGY

## 2023-03-07 PROCEDURE — 87186 SC STD MICRODIL/AGAR DIL: CPT | Performed by: OTOLARYNGOLOGY

## 2023-03-07 PROCEDURE — 99204 OFFICE O/P NEW MOD 45 MIN: CPT | Mod: 25,S$GLB,, | Performed by: OTOLARYNGOLOGY

## 2023-03-07 PROCEDURE — 82164 ANGIOTENSIN I ENZYME TEST: CPT | Performed by: OTOLARYNGOLOGY

## 2023-03-07 PROCEDURE — 3044F PR MOST RECENT HEMOGLOBIN A1C LEVEL <7.0%: ICD-10-PCS | Mod: CPTII,S$GLB,, | Performed by: OTOLARYNGOLOGY

## 2023-03-07 PROCEDURE — 87070 CULTURE OTHR SPECIMN AEROBIC: CPT | Performed by: OTOLARYNGOLOGY

## 2023-03-07 PROCEDURE — 31231 PR NASAL ENDOSCOPY, DX: ICD-10-PCS | Mod: S$GLB,,, | Performed by: OTOLARYNGOLOGY

## 2023-03-07 PROCEDURE — 1160F RVW MEDS BY RX/DR IN RCRD: CPT | Mod: CPTII,S$GLB,, | Performed by: OTOLARYNGOLOGY

## 2023-03-07 PROCEDURE — 99999 PR PBB SHADOW E&M-EST. PATIENT-LVL IV: CPT | Mod: PBBFAC,,, | Performed by: OTOLARYNGOLOGY

## 2023-03-07 PROCEDURE — 99999 PR PBB SHADOW E&M-EST. PATIENT-LVL IV: ICD-10-PCS | Mod: PBBFAC,,, | Performed by: OTOLARYNGOLOGY

## 2023-03-07 PROCEDURE — 3008F PR BODY MASS INDEX (BMI) DOCUMENTED: ICD-10-PCS | Mod: CPTII,S$GLB,, | Performed by: OTOLARYNGOLOGY

## 2023-03-07 PROCEDURE — 1160F PR REVIEW ALL MEDS BY PRESCRIBER/CLIN PHARMACIST DOCUMENTED: ICD-10-PCS | Mod: CPTII,S$GLB,, | Performed by: OTOLARYNGOLOGY

## 2023-03-07 PROCEDURE — 3044F HG A1C LEVEL LT 7.0%: CPT | Mod: CPTII,S$GLB,, | Performed by: OTOLARYNGOLOGY

## 2023-03-07 PROCEDURE — 3008F BODY MASS INDEX DOCD: CPT | Mod: CPTII,S$GLB,, | Performed by: OTOLARYNGOLOGY

## 2023-03-07 PROCEDURE — 31231 NASAL ENDOSCOPY DX: CPT | Mod: S$GLB,,, | Performed by: OTOLARYNGOLOGY

## 2023-03-07 PROCEDURE — 1159F MED LIST DOCD IN RCRD: CPT | Mod: CPTII,S$GLB,, | Performed by: OTOLARYNGOLOGY

## 2023-03-07 PROCEDURE — 1159F PR MEDICATION LIST DOCUMENTED IN MEDICAL RECORD: ICD-10-PCS | Mod: CPTII,S$GLB,, | Performed by: OTOLARYNGOLOGY

## 2023-03-07 PROCEDURE — 87077 CULTURE AEROBIC IDENTIFY: CPT | Performed by: OTOLARYNGOLOGY

## 2023-03-07 PROCEDURE — 86036 ANCA SCREEN EACH ANTIBODY: CPT | Performed by: OTOLARYNGOLOGY

## 2023-03-07 RX ORDER — CETIRIZINE HYDROCHLORIDE 10 MG/1
10 TABLET ORAL
COMMUNITY
End: 2023-06-05

## 2023-03-07 RX ORDER — AZELASTINE 1 MG/ML
1 SPRAY, METERED NASAL 2 TIMES DAILY
COMMUNITY
End: 2023-06-05

## 2023-03-07 RX ORDER — LEVOCETIRIZINE DIHYDROCHLORIDE 5 MG/1
5 TABLET, FILM COATED ORAL
COMMUNITY
End: 2023-06-05

## 2023-03-07 RX ORDER — LORATADINE 10 MG/1
10 TABLET ORAL
COMMUNITY
End: 2023-06-05

## 2023-03-07 RX ORDER — FLUTICASONE PROPIONATE 50 MCG
1 SPRAY, SUSPENSION (ML) NASAL DAILY
COMMUNITY
End: 2023-06-05

## 2023-03-07 NOTE — H&P (VIEW-ONLY)
Subjective:       Patient ID: Osbaldo Brewer is a 62 y.o. male.    Chief Complaint: Sinus Problem and Sinusitis    Osbaldo is here for sinus/nasal complaints. Symptoms have been present for years.   He began with sinus issues that was attributable to an infected tooth / root. He had this extracted as well as wisdom teeth and bone grafting. He did not improve and had subsequent right maxillary antrostomy for persistent sinus disease. He has had a persistent thick drainage coming from the right maxillary sinus that has not cleared following surgery. He has had topical medications and oral antibiotics which have not cleared it.    He has used regular saline lavage with baby shampoo, budesonide, and iodine with very thick expectorant every 2 days or so to get it up. He has also been taking an oral AH. He has nasal congestion predominantly on this side  Path showed chronic sinusitis and did not make mention of vasculitis or other abnormalities, though path description was brief.  He denies dyspnea or other lung issues.     Allergy testing: yes, negative  History of asthma: no          Social History     Tobacco Use   Smoking Status Never   Smokeless Tobacco Never     Social History     Substance and Sexual Activity   Alcohol Use None        Objective:        Constitutional:   He is oriented to person, place, and time. He appears well-developed and well-nourished. He appears alert. He is active. Normal speech.      Head:  Normocephalic and atraumatic. Head is without TMJ tenderness. No scars. Salivary glands normal.  Facial strength is normal.      Ears:    Right Ear: No drainage or swelling. No middle ear effusion.   Left Ear: No drainage or swelling.  No middle ear effusion.     Nose:  Septal deviation present. No mucosal edema, rhinorrhea or sinus tenderness. No turbinate hypertrophy.    Nasal endoscopy indicated due to degree of symptoms    Mouth/Throat  Oropharynx clear and moist without lesions or asymmetry,  normal uvula midline and mirror exam normal. Normal dentition. No uvula swelling, lacerations or trismus. No oropharyngeal exudate. Tonsillar erythema, tonsillar exudate.      Neck:  Full range of motion with neck supple and no adenopathy. Thyroid tenderness is present. No tracheal deviation, no edema, no erythema, normal range of motion, no stridor, no crepitus and no neck rigidity present. No thyroid mass present.     Cardiovascular:    Intact distal pulses and normal pulses.              Pulmonary/Chest:   Effort normal and breath sounds normal. No stridor.     Psychiatric:   His speech is normal and behavior is normal. His mood appears not anxious. His affect is not labile.     Neurological:   He is alert and oriented to person, place, and time. No sensory deficit.     Skin:   No abrasions, lacerations, lesions, or rashes. No abrasion and no bruising noted.       Tests / Results:  Ig testing normal     Name: Osbaldo Brewer     Pre-procedure diagnosis: Chronic maxillary sinusitis [J32.0]  Post-procedure diagnosis: Same    Procedure: Bilateral nasal endoscopy  Anesthesia:  4% Lidocaine and 0.25% Phenylephrine Topical    Indication: This procedure is indicated as anterior rhinoscopy is not sufficient to account for all of the patients symptoms.     Procedure: Risks, benefits, and alternatives of the procedure were discussed with the patient, and consent was obtained to perform a nasal endoscopy.  The nasal cavity was sprayed with a topical decongestant and topical anesthetic. After adequate anesthesia was obtained, the scope was passed into each nostril independently.  The nasal cavities (including inferior turbinates, middle turbinates, inferior meatus, middle meatus, superior meatus) nasopharynx, choana, eustachian tube, fossa of Rosenmüller, and adenoids were examined. All findings were normal with exception of description below. At the end of the examination, the scope was removed. The patient  tolerated the procedure well with no complications.     LEFT: normal  RIGHT: moderate septal deviation. Appears to have residual uncinate and inability to see natural os or whether it is connected with surgical os. Thick mucopus cultured and suctioned from maxillary sinus.     Assessment:       1. Chronic maxillary sinusitis    2. History of sinus surgery    3. Recurrent sinus infections    4. Nasal septal deviation          Plan:         Culture obtained though no suspicion at this time that it is related to an infection that needs to be treated with oral antibiotics.  CT Sinus to evaluate bony anatomy and see whether there is scarring of natural os or concern for recirculation  ANCA and ACE level

## 2023-03-09 LAB
ACE SERPL-CCNC: 35 U/L (ref 16–85)
BACTERIA SPEC AEROBE CULT: ABNORMAL

## 2023-03-10 ENCOUNTER — OFFICE VISIT (OUTPATIENT)
Dept: FAMILY MEDICINE | Facility: CLINIC | Age: 63
End: 2023-03-10
Payer: COMMERCIAL

## 2023-03-10 ENCOUNTER — TELEPHONE (OUTPATIENT)
Dept: OTOLARYNGOLOGY | Facility: CLINIC | Age: 63
End: 2023-03-10
Payer: COMMERCIAL

## 2023-03-10 ENCOUNTER — PATIENT MESSAGE (OUTPATIENT)
Dept: FAMILY MEDICINE | Facility: CLINIC | Age: 63
End: 2023-03-10

## 2023-03-10 VITALS
DIASTOLIC BLOOD PRESSURE: 70 MMHG | HEIGHT: 70 IN | BODY MASS INDEX: 28.8 KG/M2 | OXYGEN SATURATION: 99 % | HEART RATE: 66 BPM | WEIGHT: 201.19 LBS | SYSTOLIC BLOOD PRESSURE: 120 MMHG

## 2023-03-10 DIAGNOSIS — E03.9 HYPOTHYROIDISM, UNSPECIFIED TYPE: ICD-10-CM

## 2023-03-10 DIAGNOSIS — G47.9 SLEEP DISORDER: ICD-10-CM

## 2023-03-10 DIAGNOSIS — F41.9 ANXIETY: ICD-10-CM

## 2023-03-10 DIAGNOSIS — Z01.89 ENCOUNTER FOR LABORATORY TEST: ICD-10-CM

## 2023-03-10 DIAGNOSIS — D80.4 IGM DEFICIENCY: ICD-10-CM

## 2023-03-10 DIAGNOSIS — A49.8 PSEUDOMONAS AERUGINOSA INFECTION: ICD-10-CM

## 2023-03-10 DIAGNOSIS — D80.3 IGG2 SUBCLASS DEFICIENCY: ICD-10-CM

## 2023-03-10 DIAGNOSIS — J32.9 CHRONIC SINUSITIS, UNSPECIFIED LOCATION: Primary | ICD-10-CM

## 2023-03-10 DIAGNOSIS — F32.9 REACTIVE DEPRESSION: ICD-10-CM

## 2023-03-10 LAB
ANCA AB TITR SER IF: NORMAL TITER
P-ANCA TITR SER IF: NORMAL TITER

## 2023-03-10 PROCEDURE — 1159F MED LIST DOCD IN RCRD: CPT | Mod: CPTII,S$GLB,, | Performed by: INTERNAL MEDICINE

## 2023-03-10 PROCEDURE — 99215 OFFICE O/P EST HI 40 MIN: CPT | Mod: S$GLB,,, | Performed by: INTERNAL MEDICINE

## 2023-03-10 PROCEDURE — 99215 PR OFFICE/OUTPT VISIT, EST, LEVL V, 40-54 MIN: ICD-10-PCS | Mod: S$GLB,,, | Performed by: INTERNAL MEDICINE

## 2023-03-10 PROCEDURE — 3044F HG A1C LEVEL LT 7.0%: CPT | Mod: CPTII,S$GLB,, | Performed by: INTERNAL MEDICINE

## 2023-03-10 PROCEDURE — 99999 PR PBB SHADOW E&M-EST. PATIENT-LVL V: ICD-10-PCS | Mod: PBBFAC,,, | Performed by: INTERNAL MEDICINE

## 2023-03-10 PROCEDURE — 3078F PR MOST RECENT DIASTOLIC BLOOD PRESSURE < 80 MM HG: ICD-10-PCS | Mod: CPTII,S$GLB,, | Performed by: INTERNAL MEDICINE

## 2023-03-10 PROCEDURE — 3078F DIAST BP <80 MM HG: CPT | Mod: CPTII,S$GLB,, | Performed by: INTERNAL MEDICINE

## 2023-03-10 PROCEDURE — 3008F BODY MASS INDEX DOCD: CPT | Mod: CPTII,S$GLB,, | Performed by: INTERNAL MEDICINE

## 2023-03-10 PROCEDURE — 1159F PR MEDICATION LIST DOCUMENTED IN MEDICAL RECORD: ICD-10-PCS | Mod: CPTII,S$GLB,, | Performed by: INTERNAL MEDICINE

## 2023-03-10 PROCEDURE — 1160F PR REVIEW ALL MEDS BY PRESCRIBER/CLIN PHARMACIST DOCUMENTED: ICD-10-PCS | Mod: CPTII,S$GLB,, | Performed by: INTERNAL MEDICINE

## 2023-03-10 PROCEDURE — 1160F RVW MEDS BY RX/DR IN RCRD: CPT | Mod: CPTII,S$GLB,, | Performed by: INTERNAL MEDICINE

## 2023-03-10 PROCEDURE — 99999 PR PBB SHADOW E&M-EST. PATIENT-LVL V: CPT | Mod: PBBFAC,,, | Performed by: INTERNAL MEDICINE

## 2023-03-10 PROCEDURE — 3074F PR MOST RECENT SYSTOLIC BLOOD PRESSURE < 130 MM HG: ICD-10-PCS | Mod: CPTII,S$GLB,, | Performed by: INTERNAL MEDICINE

## 2023-03-10 PROCEDURE — 3008F PR BODY MASS INDEX (BMI) DOCUMENTED: ICD-10-PCS | Mod: CPTII,S$GLB,, | Performed by: INTERNAL MEDICINE

## 2023-03-10 PROCEDURE — 3044F PR MOST RECENT HEMOGLOBIN A1C LEVEL <7.0%: ICD-10-PCS | Mod: CPTII,S$GLB,, | Performed by: INTERNAL MEDICINE

## 2023-03-10 PROCEDURE — 3074F SYST BP LT 130 MM HG: CPT | Mod: CPTII,S$GLB,, | Performed by: INTERNAL MEDICINE

## 2023-03-10 RX ORDER — LEVOTHYROXINE SODIUM 25 UG/1
25 TABLET ORAL
Qty: 30 TABLET | Refills: 2 | Status: SHIPPED | OUTPATIENT
Start: 2023-03-10 | End: 2023-04-13

## 2023-03-10 RX ORDER — TRAZODONE HYDROCHLORIDE 50 MG/1
50 TABLET ORAL NIGHTLY
Qty: 30 TABLET | Refills: 2 | Status: SHIPPED | OUTPATIENT
Start: 2023-03-10 | End: 2023-06-05

## 2023-03-10 RX ORDER — BUSPIRONE HYDROCHLORIDE 5 MG/1
TABLET ORAL
Qty: 30 TABLET | Refills: 1 | Status: SHIPPED | OUTPATIENT
Start: 2023-03-10 | End: 2023-06-05

## 2023-03-10 RX ORDER — CIPROFLOXACIN 500 MG/1
500 TABLET ORAL 2 TIMES DAILY
Qty: 20 TABLET | Refills: 0 | Status: SHIPPED | OUTPATIENT
Start: 2023-03-10 | End: 2023-03-20

## 2023-03-10 NOTE — PATIENT INSTRUCTIONS
Chronic sinusitis, unspecified location; cipro 500 mg 2x a day after food for 10 days sent in by Dr Navarrete, and concur; discussed w his nurse. Culture w suction obtained grew pseudomonas aer. S-cipro w YANA<1. Ct sinuses as per ENT needed and pending. Agree w CT scan. Add allegra 180 mg a day for congestion. /and mucinex otc to thin mucus. Simply saline irrigation 2-3x a day. Keep f/u w ENT Dr Navarrete as directed  -     Ambulatory referral/consult to Allergy; Future; Expected date: 03/10/2023    Pseudomonas aeruginosa infection; rx w cipro 500 mg 2x a day for 10 days; add align as probiotic otc.     IgM deficiency  -     Ambulatory referral/consult to Allergy; Future; Expected date: 03/10/2023; consult dr Alvarez immunolgy for IVIG therapy.     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   -     Ambulatory referral/consult to Allergy; Future; Expected date: 03/10/2023    Hypothyroidism, unspecified type; TSH 4.28 w free T4 low nl at 0.84.   -     levothyroxine (SYNTHROID) 25 MCG tablet; Take 1 tablet (25 mcg total) by mouth before breakfast. Generic  Dispense: 30 tablet; Refill: 2    Anxiety; Limit caffeine intake with stress reduction and regular exercise as tolerated.   -     traZODone (DESYREL) 50 MG tablet; Take 1 tablet (50 mg total) by mouth every evening. Generic  Dispense: 30 tablet; Refill: 2  -     busPIRone (BUSPAR) 5 MG Tab; Take 5-7.5 mg po TID as needed for anxiety or sleep. Generic  Dispense: 30 tablet; Refill: 1    Reactive depression; light exercise inside. Avoid pollen outside.   -     traZODone (DESYREL) 50 MG tablet; Take 1 tablet (50 mg total) by mouth every evening. Generic  Dispense: 30 tablet; Refill: 2  -     busPIRone (BUSPAR) 5 MG Tab; Take 5-7.5 mg po TID as needed for anxiety or sleep. Generic  Dispense: 30 tablet; Refill: 1    Sleep disorder: avoid caffeine; avoid blue light after mid-afternoon  -     traZODone (DESYREL) 50 MG tablet; Take  1 tablet (50 mg total) by mouth every evening. Generic  Dispense: 30 tablet; Refill: 2  -     busPIRone (BUSPAR) 5 MG Tab; Take 5-7.5 mg po TID as needed for anxiety or sleep. Generic  Dispense: 30 tablet; Refill: 1

## 2023-03-10 NOTE — PROGRESS NOTES
Subjective:       Patient ID: Osbaldo Brewer is a 62 y.o. male.    Chief Complaint: Follow-up (Blood work)  HPI: Here today for reassessment and go over his lab work  Follow-up  Associated symptoms include congestion. Pertinent negatives include no abdominal pain, arthralgias, chest pain, coughing, headaches, myalgias, nausea, rash or vomiting.     Chronic sinusitis, unspecified location; cipro 500 mg 2x a day after food for 10 days sent in by Dr Navarrete, and concur; discussed w his nurse. Culture w suction obtained grew pseudomonas aer. S-cipro w YANA<1. Ct sinuses as per ENT needed and pending. Agree w CT scan. Add allegra 180 mg a day for congestion. /and mucinex otc to thin mucus. Simply saline irrigation 2-3x a day. Keep f/u w ENT Dr Navarrete as directed.  Earlier had been placed on generic Omnicef 300 mg every 12 hours for 10 days which was no help.  During his examination with ENT Dr. Navarrete lot of suction of mucus was removed with the office visit.  Has performed 3 home COVID test which were all negative with recent ER visit.  Keep his follow-up with ENT Dr. Navarrete as directed.  Eventual surgical intervention planned per ENT.  Date to be determined.  CT of the sinuses to be obtain per ENT  -     Ambulatory referral/consult to Allergy; Future; Expected date: 03/10/2023    Pseudomonas aeruginosa infection; rx w cipro 500 mg 2x a day for 10 days; add align as probiotic otc.     IgM deficiency:  Level returned back 42 with normal   -     Ambulatory referral/consult to Allergy; Future; Expected date: 03/10/2023; consult dr Alvarez immunolgy for IVIG therapy.  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   -     Ambulatory referral/consult to Allergy; Future; Expected date: 03/10/2023    Hypothyroidism, unspecified type; TSH 4.28 w free T4 low nl at 0.84.  Will begin levothyroxine at 25 mcg daily.  -     levothyroxine  (SYNTHROID) 25 MCG tablet; Take 1 tablet (25 mcg total) by mouth before breakfast. Generic  Dispense: 30 tablet; Refill: 2    Anxiety; Limit caffeine intake with stress reduction and regular exercise as tolerated.   -     traZODone (DESYREL) 50 MG tablet; Take 1 tablet (50 mg total) by mouth every evening. Generic  Dispense: 30 tablet; Refill: 2  -     busPIRone (BUSPAR) 5 MG Tab; Take 5-7.5 mg po TID as needed for anxiety or sleep. Generic  Dispense: 30 tablet; Refill: 1    Reactive depression; light exercise inside. Avoid pollen outside.  Patient is noted depression since about February of 2022 with being sick.  Usually he is an active individual and doer...  -     traZODone (DESYREL) 50 MG tablet; Take 1 tablet (50 mg total) by mouth every evening. Generic  Dispense: 30 tablet; Refill: 2  -     busPIRone (BUSPAR) 5 MG Tab; Take 5-7.5 mg po TID as needed for anxiety or sleep. Generic  Dispense: 30 tablet; Refill: 1    Sleep disorder: avoid caffeine; avoid blue light after mid-afternoon  -     traZODone (DESYREL) 50 MG tablet; Take 1 tablet (50 mg total) by mouth every evening. Generic  Dispense: 30 tablet; Refill: 2  -     busPIRone (BUSPAR) 5 MG Tab; Take 5-7.5 mg po TID as needed for anxiety or sleep. Generic  Dispense: 30 tablet; Refill: 1    Encounter for lab test: Labs reviewed and discussed with patient at length and ordered for follow-up    Total time 11:36 a.m. to 12:35 p.m..  Greater than 50% of the time spent in discussion counseling and review.  Labs reviewed with patient at length and ordered for follow-up.  Medications reviewed and addressed.  Various different topics discussed including plan of care.  Case discussed with Allergy immunology Dr. Alvarez as well.  Extensive time spent discussing various different topics with patient.  Discussed with ENT Dr. Neville Naavrrete as well.  CT of the sinuses to be obtained per ENT    Review of Systems   Constitutional:  Negative for appetite change and unexpected  "weight change.   HENT:  Positive for congestion. Negative for postnasal drip, rhinorrhea and sinus pressure.         Denies seasonal allergies, or perennial allergies   Eyes:  Negative for discharge and itching.   Respiratory:  Negative for cough, chest tightness, shortness of breath and wheezing.    Cardiovascular:  Negative for chest pain, palpitations and leg swelling.   Gastrointestinal:  Negative for abdominal pain, blood in stool, constipation, diarrhea, nausea and vomiting.   Endocrine: Negative for polydipsia, polyphagia and polyuria.   Genitourinary:  Negative for dysuria and hematuria.   Musculoskeletal:  Negative for arthralgias and myalgias.   Skin:  Negative for rash.   Allergic/Immunologic: Negative for environmental allergies and food allergies.   Neurological:  Negative for tremors, seizures and headaches.   Hematological:  Negative for adenopathy. Does not bruise/bleed easily.   Psychiatric/Behavioral:          Denies anxiety or depression.    Objective:        Vitals:    03/10/23 1055   BP: 120/70   Pulse: 66   SpO2: 99%   Weight: 91.3 kg (201 lb 2.7 oz)   Height: 5' 10" (1.778 m)       BMI Readings from Last 3 Encounters:   03/10/23 28.86 kg/m²   03/07/23 28.82 kg/m²   03/02/23 28.39 kg/m²        Wt Readings from Last 3 Encounters:   03/10/23 1055 91.3 kg (201 lb 2.7 oz)   03/07/23 1309 91.1 kg (200 lb 13.4 oz)   03/02/23 1224 89.8 kg (197 lb 13.8 oz)        BP Readings from Last 3 Encounters:   03/10/23 120/70   03/02/23 114/72   01/11/23 124/72        There are no preventive care reminders to display for this patient.     Health Maintenance Due   Topic Date Due    Hepatitis C Screening  Never done    COVID-19 Vaccine (1) Never done    HIV Screening  Never done    TETANUS VACCINE  Never done    Colorectal Cancer Screening  Never done    Shingles Vaccine (1 of 2) Never done         Past Medical History:   Diagnosis Date    BPH associated with nocturia 1/11/2023    Erectile dysfunction 1/11/2023    " History of Helicobacter pylori infection 1/11/2023    History of sinus surgery 1/11/2023    Mixed hyperlipidemia 8/29/2014       No past surgical history on file.    Social History     Tobacco Use    Smoking status: Never    Smokeless tobacco: Never       No family history on file.    Review of patient's allergies indicates:  No Known Allergies    Current Outpatient Medications on File Prior to Visit   Medication Sig Dispense Refill    atorvastatin (LIPITOR) 40 MG tablet Take 40 mg by mouth once daily.      azelastine (ASTELIN) 137 mcg (0.1 %) nasal spray 1 spray by Nasal route 2 (two) times daily.      BUDESONIDE 0.6 MG/GENTAMICIN 50 ML NORMAL SALINE NASAL IRRIGATION ADD 1ML (0.5ML TWICE) OF MEDICATION TO 250ML OF SALINE IN SALINE IRRIGATION BOTTLE; IRRIGATE SINUSES WITH 120ML THROUGH EACH NOSTRIL TWICE DAILY      cetirizine (ZYRTEC) 10 MG tablet Take 10 mg by mouth twice a week.      fluticasone propionate (FLONASE) 50 mcg/actuation nasal spray 1 spray by Each Nostril route once daily.      levocetirizine (XYZAL) 5 MG tablet Take 5 mg by mouth 3 (three) times a week.      loratadine (CLARITIN) 10 mg tablet Take 10 mg by mouth twice a week.      tadalafiL (CIALIS) 5 MG tablet Take 1 tablet (5 mg total) by mouth daily as needed for Erectile Dysfunction. 90 tablet 0    [DISCONTINUED] busPIRone (BUSPAR) 5 MG Tab Take buspar 5-7.5 mg po TID as needed for anxiety or sleep. 30 tablet 1    [DISCONTINUED] traZODone (DESYREL) 50 MG tablet Take 50 mg po every evening for sleep as needed; 1 hr later if needed can take buspar 30 tablet 2     No current facility-administered medications on file prior to visit.       Physical Exam  Vitals reviewed.   Constitutional:       Appearance: He is well-developed.   HENT:      Head: Normocephalic and atraumatic.      Comments: R-TM slight inflamed; L-TM obscured by wax; to use Debrox at home. Throat pink and moist; mildly increased slightly tender submandibular LN's. NM swollen,  inflamed, w clear to yel-white mucus.   Neck:      Thyroid: No thyromegaly.   Cardiovascular:      Rate and Rhythm: Normal rate and regular rhythm.      Heart sounds: Normal heart sounds. No murmur heard.    No gallop.   Pulmonary:      Effort: Pulmonary effort is normal. No respiratory distress.      Breath sounds: Normal breath sounds. No wheezing or rales.   Abdominal:      General: Bowel sounds are normal. There is no distension.      Palpations: Abdomen is soft.      Tenderness: There is no abdominal tenderness. There is no guarding or rebound.   Musculoskeletal:         General: Normal range of motion.      Cervical back: Normal range of motion and neck supple.      Right lower leg: No edema.      Left lower leg: No edema.   Lymphadenopathy:      Cervical: No cervical adenopathy.   Skin:     Findings: No rash.   Neurological:      Mental Status: He is alert and oriented to person, place, and time.      Comments: Moves all 4 extremities fine.   Psychiatric:         Behavior: Behavior normal.         Thought Content: Thought content normal.       Assessment:       1. Chronic sinusitis, unspecified location    2. Pseudomonas aeruginosa infection    3. IgM deficiency    4. IgG2 subclass deficiency    5. Hypothyroidism, unspecified type    6. Anxiety    7. Reactive depression    8. Sleep disorder        Plan:       Chronic sinusitis, unspecified location; cipro 500 mg 2x a day after food for 10 days sent in by Dr Navarrete, and concur; discussed w his nurse. Culture w suction obtained grew pseudomonas aer. S-cipro w YANA<1. Ct sinuses as per ENT needed and pending. Agree w CT scan. Add allegra 180 mg a day for congestion. /and mucinex otc to thin mucus. Simply saline irrigation 2-3x a day. Keep f/u w ENT Dr Navarrete as directed.  Earlier had been placed on generic Omnicef 300 mg every 12 hours for 10 days which was no help.  During his examination with ENT Dr. Navarrete lot of suction of mucus was removed with the  office visit.  Has performed 3 home COVID test which were all negative with recent ER visit.  Keep his follow-up with ENT Dr. Navarrete as directed.  Eventual surgical intervention planned per ENT.  CT of the sinuses to be obtain per ENT  -     Ambulatory referral/consult to Allergy; Future; Expected date: 03/10/2023    Pseudomonas aeruginosa infection; rx w cipro 500 mg 2x a day for 10 days; add align as probiotic otc.     IgM deficiency:  Level returned back 42 with normal   -     Ambulatory referral/consult to Allergy; Future; Expected date: 03/10/2023; consult dr Alvarez immunolgy for IVIG therapy.  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   -     Ambulatory referral/consult to Allergy; Future; Expected date: 03/10/2023    Hypothyroidism, unspecified type; TSH 4.28 w free T4 low nl at 0.84.  Will begin levothyroxine at 25 mcg daily.  -     levothyroxine (SYNTHROID) 25 MCG tablet; Take 1 tablet (25 mcg total) by mouth before breakfast. Generic  Dispense: 30 tablet; Refill: 2    Anxiety; Limit caffeine intake with stress reduction and regular exercise as tolerated.   -     traZODone (DESYREL) 50 MG tablet; Take 1 tablet (50 mg total) by mouth every evening. Generic  Dispense: 30 tablet; Refill: 2  -     busPIRone (BUSPAR) 5 MG Tab; Take 5-7.5 mg po TID as needed for anxiety or sleep. Generic  Dispense: 30 tablet; Refill: 1    Reactive depression; light exercise inside. Avoid pollen outside.  Patient is noted depression since about February of 2022 with being sick.  Usually he is an active individual and doer...  -     traZODone (DESYREL) 50 MG tablet; Take 1 tablet (50 mg total) by mouth every evening. Generic  Dispense: 30 tablet; Refill: 2  -     busPIRone (BUSPAR) 5 MG Tab; Take 5-7.5 mg po TID as needed for anxiety or sleep. Generic  Dispense: 30 tablet; Refill: 1    Sleep disorder: avoid caffeine; avoid blue light after  mid-afternoon  -     traZODone (DESYREL) 50 MG tablet; Take 1 tablet (50 mg total) by mouth every evening. Generic  Dispense: 30 tablet; Refill: 2  -     busPIRone (BUSPAR) 5 MG Tab; Take 5-7.5 mg po TID as needed for anxiety or sleep. Generic  Dispense: 30 tablet; Refill: 1    Encounter for lab test: Labs reviewed and discussed with patient at length and ordered for follow-up

## 2023-03-10 NOTE — TELEPHONE ENCOUNTER
S/w pt, he states that he has never been treated for this type of bacteria before. He says he's been on antibiotics and has had a steroid shot; couldn't recall the names. But this is the first time pseudomonas has been brought up.

## 2023-03-16 ENCOUNTER — HOSPITAL ENCOUNTER (OUTPATIENT)
Dept: RADIOLOGY | Facility: HOSPITAL | Age: 63
Discharge: HOME | End: 2023-03-16
Attending: OTOLARYNGOLOGY
Payer: COMMERCIAL

## 2023-03-16 ENCOUNTER — TELEPHONE (OUTPATIENT)
Dept: OTOLARYNGOLOGY | Facility: CLINIC | Age: 63
End: 2023-03-16
Payer: COMMERCIAL

## 2023-03-16 DIAGNOSIS — J32.0 CHRONIC MAXILLARY SINUSITIS: ICD-10-CM

## 2023-03-16 PROCEDURE — 70486 CT MAXILLOFACIAL W/O DYE: CPT | Mod: TC,PO

## 2023-03-16 PROCEDURE — 70486 CT SINUSES WITHOUT CONTRAST: ICD-10-PCS | Mod: 26,,, | Performed by: RADIOLOGY

## 2023-03-16 PROCEDURE — 70486 CT MAXILLOFACIAL W/O DYE: CPT | Mod: 26,,, | Performed by: RADIOLOGY

## 2023-03-16 NOTE — TELEPHONE ENCOUNTER
----- Message from Lizbeth Lei sent at 3/16/2023 10:22 AM CDT -----  Type: Needs Medical Advice  Who Called:  pt wife garima Nino Call Back Number: [phone] (home)     Additional Information:requesting a call back ,  wants to know if pt can change the rx please advise thank you

## 2023-03-16 NOTE — TELEPHONE ENCOUNTER
S/w pt and advised that the abx that Dr. Navarrete prescribed is based on the culture and sensitivity results. The abx he sent will treat the bacteria pt has. However, it may not be the complete treatment plan d/t needing CT results for further diagnosis and treatment options. Pt states he is getting his CT today at 2pm. Advised pt that once Dr. Navarrete reviews those results, he will contact pt with results and recommendations. Pt states he is very anxious and disturbed that this bacteria has been in his body for over a year and it was caused by his last sinus surgery. Advised pt that the bacteria he has is usually caused from a water source, and it's located in his sinuses. Pt states that he uses a nasal lavage with Jose's baby shampoo 2xdaily, as well as saline rinsing an additional 3xdaily. Also taking caritin, zyrtec and tylenol sinus, alternating claritin and zyrtec every other day and tylenol sinus daily as instructed by his PCP. Instructed pt on cleaning his lavage system daily and maybe discussing his regimen with Dr. Navarrete, since he is the ENT specialist, and get his opinion, pt verbalized understanding.

## 2023-03-17 DIAGNOSIS — J34.2 NASAL SEPTAL DEVIATION: ICD-10-CM

## 2023-03-17 DIAGNOSIS — J32.9 RECURRENT SINUS INFECTIONS: ICD-10-CM

## 2023-03-17 DIAGNOSIS — J32.0 CHRONIC MAXILLARY SINUSITIS: Primary | ICD-10-CM

## 2023-03-17 DIAGNOSIS — Z98.890 HISTORY OF SINUS SURGERY: ICD-10-CM

## 2023-03-20 NOTE — TELEPHONE ENCOUNTER
----- Message from Charlee Hanna sent at 3/20/2023 10:32 AM CDT -----  Contact: [phone]  Type: Needs Medical Advice  Who Called:  Pts wife Trang Nino Call Back Number: [phone]    Additional Information: Pts wife is calling to schedule pts surgery. Pls call back and advise

## 2023-03-20 NOTE — TELEPHONE ENCOUNTER
I spoke with patient and scheduled surgery.  He states he would like something to help with his nerves the night before so he can sleep.  Please advise

## 2023-03-21 RX ORDER — DIAZEPAM 5 MG/1
5 TABLET ORAL ONCE AS NEEDED
Qty: 1 TABLET | Refills: 0 | Status: SHIPPED | OUTPATIENT
Start: 2023-03-21 | End: 2023-06-05

## 2023-03-22 ENCOUNTER — OFFICE VISIT (OUTPATIENT)
Dept: CARDIOLOGY | Facility: CLINIC | Age: 63
End: 2023-03-22
Payer: COMMERCIAL

## 2023-03-22 VITALS
HEIGHT: 70 IN | BODY MASS INDEX: 28.91 KG/M2 | SYSTOLIC BLOOD PRESSURE: 127 MMHG | DIASTOLIC BLOOD PRESSURE: 82 MMHG | HEART RATE: 74 BPM | WEIGHT: 201.94 LBS

## 2023-03-22 DIAGNOSIS — E78.2 MIXED HYPERLIPIDEMIA: Primary | Chronic | ICD-10-CM

## 2023-03-22 DIAGNOSIS — Z01.30 ENCOUNTER FOR EXAMINATION OF BLOOD PRESSURE WITHOUT ABNORMAL FINDINGS: ICD-10-CM

## 2023-03-22 PROCEDURE — 93010 ELECTROCARDIOGRAM REPORT: CPT | Mod: S$GLB,,, | Performed by: INTERNAL MEDICINE

## 2023-03-22 PROCEDURE — 93010 EKG 12-LEAD: ICD-10-PCS | Mod: S$GLB,,, | Performed by: INTERNAL MEDICINE

## 2023-03-22 PROCEDURE — 3079F PR MOST RECENT DIASTOLIC BLOOD PRESSURE 80-89 MM HG: ICD-10-PCS | Mod: CPTII,S$GLB,, | Performed by: INTERNAL MEDICINE

## 2023-03-22 PROCEDURE — 93005 ELECTROCARDIOGRAM TRACING: CPT | Mod: PO

## 2023-03-22 PROCEDURE — 1159F PR MEDICATION LIST DOCUMENTED IN MEDICAL RECORD: ICD-10-PCS | Mod: CPTII,S$GLB,, | Performed by: INTERNAL MEDICINE

## 2023-03-22 PROCEDURE — 3008F BODY MASS INDEX DOCD: CPT | Mod: CPTII,S$GLB,, | Performed by: INTERNAL MEDICINE

## 2023-03-22 PROCEDURE — 3044F HG A1C LEVEL LT 7.0%: CPT | Mod: CPTII,S$GLB,, | Performed by: INTERNAL MEDICINE

## 2023-03-22 PROCEDURE — 99204 OFFICE O/P NEW MOD 45 MIN: CPT | Mod: 25,S$GLB,, | Performed by: INTERNAL MEDICINE

## 2023-03-22 PROCEDURE — 1160F RVW MEDS BY RX/DR IN RCRD: CPT | Mod: CPTII,S$GLB,, | Performed by: INTERNAL MEDICINE

## 2023-03-22 PROCEDURE — 3044F PR MOST RECENT HEMOGLOBIN A1C LEVEL <7.0%: ICD-10-PCS | Mod: CPTII,S$GLB,, | Performed by: INTERNAL MEDICINE

## 2023-03-22 PROCEDURE — 3079F DIAST BP 80-89 MM HG: CPT | Mod: CPTII,S$GLB,, | Performed by: INTERNAL MEDICINE

## 2023-03-22 PROCEDURE — 1159F MED LIST DOCD IN RCRD: CPT | Mod: CPTII,S$GLB,, | Performed by: INTERNAL MEDICINE

## 2023-03-22 PROCEDURE — 3008F PR BODY MASS INDEX (BMI) DOCUMENTED: ICD-10-PCS | Mod: CPTII,S$GLB,, | Performed by: INTERNAL MEDICINE

## 2023-03-22 PROCEDURE — 1160F PR REVIEW ALL MEDS BY PRESCRIBER/CLIN PHARMACIST DOCUMENTED: ICD-10-PCS | Mod: CPTII,S$GLB,, | Performed by: INTERNAL MEDICINE

## 2023-03-22 PROCEDURE — 3074F PR MOST RECENT SYSTOLIC BLOOD PRESSURE < 130 MM HG: ICD-10-PCS | Mod: CPTII,S$GLB,, | Performed by: INTERNAL MEDICINE

## 2023-03-22 PROCEDURE — 99999 PR PBB SHADOW E&M-EST. PATIENT-LVL IV: CPT | Mod: PBBFAC,,, | Performed by: INTERNAL MEDICINE

## 2023-03-22 PROCEDURE — 99999 PR PBB SHADOW E&M-EST. PATIENT-LVL IV: ICD-10-PCS | Mod: PBBFAC,,, | Performed by: INTERNAL MEDICINE

## 2023-03-22 PROCEDURE — 99204 PR OFFICE/OUTPT VISIT, NEW, LEVL IV, 45-59 MIN: ICD-10-PCS | Mod: 25,S$GLB,, | Performed by: INTERNAL MEDICINE

## 2023-03-22 PROCEDURE — 3074F SYST BP LT 130 MM HG: CPT | Mod: CPTII,S$GLB,, | Performed by: INTERNAL MEDICINE

## 2023-03-22 NOTE — PROGRESS NOTES
Subjective:    Patient ID:  Osbaldo Brewer is a 62 y.o. male who presents for evaluation of Hyperlipidemia      Problem List Items Addressed This Visit          Cardiac/Vascular    Mixed hyperlipidemia - Primary (Chronic)       HPI    Here to establish care    The patient states that he feels OK, but is having fatigue.    Had a lot of oral surgery and sinus issues recently. Sinus surgery was done. Also got started on thyroid medications. Immune system also not doing well per his report. Also on a lot of Abx. Seeing Landon.    No chest pain.  No shortness of breath.  Used to walks 4-5 miles a day without issues, trying to get back to it    Stent/angiogram history - Had angiogram - Negative - 4 years ago    Personal history of heart attack or stroke - None that he is aware of  Family history of heart disease - Dad with MI at 38, passed at 42, smoker, cousins who don't smoke with stents and strokes    Past Medical History:   Diagnosis Date    BPH associated with nocturia 1/11/2023    Erectile dysfunction 1/11/2023    History of Helicobacter pylori infection 1/11/2023    History of sinus surgery 1/11/2023    Mixed hyperlipidemia 8/29/2014       History reviewed. No pertinent surgical history.    History reviewed. No pertinent family history.    Social History     Socioeconomic History    Marital status:    Tobacco Use    Smoking status: Never    Smokeless tobacco: Never       Review of patient's allergies indicates:  No Known Allergies    Review of Systems   Constitutional: Negative for decreased appetite, fever and malaise/fatigue.   Eyes:  Negative for blurred vision.   Cardiovascular:  Negative for chest pain, dyspnea on exertion, irregular heartbeat and leg swelling.   Respiratory:  Negative for cough, hemoptysis, shortness of breath and wheezing.    Endocrine: Negative for cold intolerance and heat intolerance.   Hematologic/Lymphatic: Negative for bleeding problem.   Musculoskeletal:  Negative  "for muscle weakness and myalgias.   Gastrointestinal:  Negative for abdominal pain, constipation and diarrhea.   Genitourinary:  Negative for bladder incontinence.   Neurological:  Negative for dizziness and weakness.   Psychiatric/Behavioral:  Negative for depression.       Objective:     Vitals:    03/22/23 0838   BP: 127/82   BP Location: Right arm   Patient Position: Sitting   BP Method: Large (Automatic)   Pulse: 74   Weight: 91.6 kg (201 lb 15.1 oz)   Height: 5' 10" (1.778 m)        Physical Exam  Constitutional:       Appearance: He is well-developed.   HENT:      Head: Normocephalic and atraumatic.   Neck:      Vascular: No JVD.   Cardiovascular:      Rate and Rhythm: Normal rate and regular rhythm.      Heart sounds: Normal heart sounds. No murmur heard.    No friction rub. No gallop.   Pulmonary:      Effort: Pulmonary effort is normal. No respiratory distress.      Breath sounds: Normal breath sounds. No wheezing or rales.   Abdominal:      General: Bowel sounds are normal.      Palpations: Abdomen is soft.      Tenderness: There is no abdominal tenderness. There is no guarding or rebound.   Musculoskeletal:      Cervical back: Normal range of motion and neck supple.   Skin:     General: Skin is warm and dry.   Neurological:      Mental Status: He is alert and oriented to person, place, and time.   Psychiatric:         Behavior: Behavior normal.           Current Outpatient Medications   Medication Instructions    atorvastatin (LIPITOR) 40 mg, Oral, Daily    azelastine (ASTELIN) 137 mcg (0.1 %) nasal spray 1 spray, Nasal, 2 times daily    BUDESONIDE 0.6 MG/GENTAMICIN 50 ML NORMAL SALINE NASAL IRRIGATION ADD 1ML (0.5ML TWICE) OF MEDICATION TO 250ML OF SALINE IN SALINE IRRIGATION BOTTLE; IRRIGATE SINUSES WITH 120ML THROUGH EACH NOSTRIL TWICE DAILY    busPIRone (BUSPAR) 5 MG Tab Take 5-7.5 mg po TID as needed for anxiety or sleep. Generic    cetirizine (ZYRTEC) 10 mg, Oral, Twice weekly    diazePAM (VALIUM) " 5 mg, Oral, Once as needed    fluticasone propionate (FLONASE) 50 mcg/actuation nasal spray 1 spray, Each Nostril, Daily    levocetirizine (XYZAL) 5 mg, Oral, Three times weekly    levothyroxine (SYNTHROID) 25 mcg, Oral, Before breakfast, Generic    loratadine (CLARITIN) 10 mg, Oral, Twice weekly    tadalafiL (CIALIS) 5 mg, Oral, Daily PRN    traZODone (DESYREL) 50 mg, Oral, Nightly, Generic       Lipid Panel:   Lab Results   Component Value Date    CHOL 160 03/03/2023    HDL 53 03/03/2023    LDLCALC 96.0 03/03/2023    TRIG 55 03/03/2023    CHOLHDL 33.1 03/03/2023         The 10-year ASCVD risk score (Eliud CARY, et al., 2019) is: 8.8%    Values used to calculate the score:      Age: 62 years      Sex: Male      Is Non- : No      Diabetic: No      Tobacco smoker: No      Systolic Blood Pressure: 127 mmHg      Is BP treated: No      HDL Cholesterol: 53 mg/dL      Total Cholesterol: 160 mg/dL    All pertinent labs, imaging, and EKGs reviewed.  Patient's most recent EKG tracing was personally interpreted by this provider.    Most Recent EKG Results  No results found for this or any previous visit.    Most Recent Echocardiogram Results  No results found for this or any previous visit.      Most Recent Nuclear Stress Test Results  No results found for this or any previous visit.      Most Recent Cardiac PET Stress Test Results  No results found for this or any previous visit.      Most Recent Cardiovascular Angiogram results  No results found for this or any previous visit.      Other Most Recent Cardiology Results  No results found for this or any previous visit.        Assessment:       1. Mixed hyperlipidemia         Plan:     Symptoms OK today  BP/Pulse OK today  Most recent lipid panel reviewed personally     Echocardiogram   Continue atorvastatin 40 mg PO Daily     Continue other cardiac medications  Mediterranean Diet/Cardiovascular Exercise Program    Patient queried and all questions were  answered.    F/u in 4 months, couples visit OK      Signed:    Rafa Pa MD  3/22/2023 7:32 AM

## 2023-03-24 ENCOUNTER — LAB VISIT (OUTPATIENT)
Dept: LAB | Facility: HOSPITAL | Age: 63
End: 2023-03-24
Attending: SPECIALIST
Payer: COMMERCIAL

## 2023-03-24 DIAGNOSIS — D83.0 PREDOMINANTLY B-CELL DEFECT: Primary | ICD-10-CM

## 2023-03-24 DIAGNOSIS — J31.0 CHRONIC RHINITIS: ICD-10-CM

## 2023-03-24 DIAGNOSIS — J30.1 ALLERGIC RHINITIS DUE TO POLLEN: ICD-10-CM

## 2023-03-24 PROCEDURE — 86003 ALLG SPEC IGE CRUDE XTRC EA: CPT | Performed by: SPECIALIST

## 2023-03-24 PROCEDURE — 84443 ASSAY THYROID STIM HORMONE: CPT | Performed by: SPECIALIST

## 2023-03-24 PROCEDURE — 84432 ASSAY OF THYROGLOBULIN: CPT | Performed by: SPECIALIST

## 2023-03-24 PROCEDURE — 86376 MICROSOMAL ANTIBODY EACH: CPT | Performed by: SPECIALIST

## 2023-03-24 PROCEDURE — 86317 IMMUNOASSAY INFECTIOUS AGENT: CPT | Performed by: SPECIALIST

## 2023-03-24 PROCEDURE — 86003 ALLG SPEC IGE CRUDE XTRC EA: CPT | Mod: 59 | Performed by: SPECIALIST

## 2023-03-24 PROCEDURE — 86364 TISS TRNSGLTMNASE EA IG CLAS: CPT | Performed by: SPECIALIST

## 2023-03-25 PROBLEM — Z01.89 ENCOUNTER FOR LABORATORY TEST: Status: ACTIVE | Noted: 2023-03-25

## 2023-03-25 PROBLEM — A49.8 PSEUDOMONAS AERUGINOSA INFECTION: Status: ACTIVE | Noted: 2023-03-25

## 2023-03-25 PROBLEM — F41.9 ANXIETY: Status: ACTIVE | Noted: 2023-03-25

## 2023-03-25 PROBLEM — F32.9 REACTIVE DEPRESSION: Status: ACTIVE | Noted: 2023-03-25

## 2023-03-25 PROBLEM — E03.9 HYPOTHYROIDISM: Status: ACTIVE | Noted: 2023-03-25

## 2023-03-25 PROBLEM — D80.3 IGG2 SUBCLASS DEFICIENCY: Status: ACTIVE | Noted: 2023-03-25

## 2023-03-25 PROBLEM — J32.9 CHRONIC SINUSITIS: Status: ACTIVE | Noted: 2023-03-25

## 2023-03-25 PROBLEM — D80.4 IGM DEFICIENCY: Status: ACTIVE | Noted: 2023-03-25

## 2023-03-25 PROBLEM — G47.9 SLEEP DISORDER: Status: ACTIVE | Noted: 2023-03-25

## 2023-03-25 LAB — TSH SERPL DL<=0.005 MIU/L-ACNC: 3.48 UIU/ML (ref 0.4–4)

## 2023-03-27 LAB
CLAM IGE QN: <0.1 KU/L
CODFISH IGE QN: <0.1 KU/L
CORN IGE QN: <0.1 KU/L
COW MILK IGE QN: <0.1 KU/L
DEPRECATED CLAM IGE RAST QL: NORMAL
DEPRECATED CODFISH IGE RAST QL: NORMAL
DEPRECATED CORN IGE RAST QL: NORMAL
DEPRECATED COW MILK IGE RAST QL: NORMAL
DEPRECATED EGG WHITE IGE RAST QL: NORMAL
DEPRECATED GLUTEN IGE RAST QL: NORMAL
DEPRECATED PEANUT IGE RAST QL: NORMAL
DEPRECATED SCALLOP IGE RAST QL: NORMAL
DEPRECATED SESAME SEED IGE RAST QL: NORMAL
DEPRECATED SESAME SEED IGE RAST QL: NORMAL
DEPRECATED SHRIMP IGE RAST QL: NORMAL
DEPRECATED SOYBEAN IGE RAST QL: NORMAL
DEPRECATED WALNUT IGE RAST QL: NORMAL
DEPRECATED WHEAT IGE RAST QL: NORMAL
EGG WHITE IGE QN: <0.1 KU/L
GLUTEN IGE QN: <0.1 KU/L
PEANUT IGE QN: <0.1 KU/L
SCALLOP IGE QN: <0.1 KU/L
SESAME SEED IGE QN: <0.1 KU/L
SESAME SEED IGE QN: <0.1 KU/L
SHRIMP IGE QN: <0.1 KU/L
SOYBEAN IGE QN: <0.1 KU/L
THRYOGLOBULIN INTERPRETATION: ABNORMAL
THYROGLOB AB SERPL-ACNC: <1.8 IU/ML
THYROGLOB SERPL-MCNC: 24 NG/ML
THYROPEROXIDASE IGG SERPL-ACNC: 548.3 IU/ML
WALNUT IGE QN: <0.1 KU/L
WHEAT IGE QN: <0.1 KU/L

## 2023-03-30 ENCOUNTER — ANESTHESIA EVENT (OUTPATIENT)
Dept: SURGERY | Facility: HOSPITAL | Age: 63
End: 2023-03-30
Payer: COMMERCIAL

## 2023-03-30 LAB
GLIADIN PEPTIDE IGA SER-ACNC: 0.9 U/ML
GLIADIN PEPTIDE IGG SER-ACNC: <0.6 U/ML
IGA SERPL-MCNC: 165 MG/DL (ref 70–400)
TTG IGA SER-ACNC: 0.6 U/ML
TTG IGG SER-ACNC: 0.9 U/ML

## 2023-03-31 ENCOUNTER — ANESTHESIA (OUTPATIENT)
Dept: SURGERY | Facility: HOSPITAL | Age: 63
End: 2023-03-31
Payer: COMMERCIAL

## 2023-03-31 ENCOUNTER — HOSPITAL ENCOUNTER (OUTPATIENT)
Facility: HOSPITAL | Age: 63
Discharge: HOME | End: 2023-03-31
Attending: OTOLARYNGOLOGY | Admitting: OTOLARYNGOLOGY
Payer: COMMERCIAL

## 2023-03-31 VITALS
DIASTOLIC BLOOD PRESSURE: 69 MMHG | HEIGHT: 74 IN | TEMPERATURE: 98 F | OXYGEN SATURATION: 96 % | RESPIRATION RATE: 16 BRPM | HEART RATE: 77 BPM | BODY MASS INDEX: 26.31 KG/M2 | SYSTOLIC BLOOD PRESSURE: 133 MMHG | WEIGHT: 205 LBS

## 2023-03-31 DIAGNOSIS — J32.0 CHRONIC MAXILLARY SINUSITIS: Primary | ICD-10-CM

## 2023-03-31 DIAGNOSIS — J34.2 NASAL SEPTAL DEVIATION: ICD-10-CM

## 2023-03-31 LAB
DEPRECATED S PNEUM12 IGG SER-MCNC: <0.3 UG/ML
DEPRECATED S PNEUM23 IGG SER-MCNC: 1 UG/ML
DEPRECATED S PNEUM4 IGG SER-MCNC: <0.3 UG/ML
DEPRECATED S PNEUM8 IGG SER-MCNC: <0.3 UG/ML
DEPRECATED S PNEUM9 IGG SER-MCNC: <0.3 UG/ML
S PN DA SERO 19F IGG SER-MCNC: 0.6 UG/ML
S PNEUM DA 1 IGG SER-MCNC: <0.3 UG/ML
S PNEUM DA 14 IGG SER-MCNC: 1.5
S PNEUM DA 18C IGG SER-MCNC: <0.3
S PNEUM DA 3 IGG SER-MCNC: <0.3 UG/ML
S PNEUM DA 5 IGG SER-MCNC: 8.2 UG/ML
S PNEUM DA 6B IGG SER-MCNC: <0.3 UG/ML
S PNEUM DA 7F IGG SER-MCNC: 1.5 UG/ML
S PNEUM DA 9V IGG SER-MCNC: <0.3 UG/ML

## 2023-03-31 PROCEDURE — 71000015 HC POSTOP RECOV 1ST HR: Mod: PO | Performed by: OTOLARYNGOLOGY

## 2023-03-31 PROCEDURE — D9220A PRA ANESTHESIA: Mod: CRNA,,, | Performed by: NURSE ANESTHETIST, CERTIFIED REGISTERED

## 2023-03-31 PROCEDURE — 30520 PR REPAIR, NASAL SEPTUM: ICD-10-PCS | Mod: ,,, | Performed by: OTOLARYNGOLOGY

## 2023-03-31 PROCEDURE — 36000711: Mod: PO | Performed by: OTOLARYNGOLOGY

## 2023-03-31 PROCEDURE — 25000003 PHARM REV CODE 250: Mod: PO | Performed by: NURSE ANESTHETIST, CERTIFIED REGISTERED

## 2023-03-31 PROCEDURE — 71000033 HC RECOVERY, INTIAL HOUR: Mod: PO | Performed by: OTOLARYNGOLOGY

## 2023-03-31 PROCEDURE — 88305 TISSUE EXAM BY PATHOLOGIST: ICD-10-PCS | Mod: 26,,, | Performed by: PATHOLOGY

## 2023-03-31 PROCEDURE — 88305 TISSUE EXAM BY PATHOLOGIST: CPT | Mod: PO | Performed by: PATHOLOGY

## 2023-03-31 PROCEDURE — 36000710: Mod: PO | Performed by: OTOLARYNGOLOGY

## 2023-03-31 PROCEDURE — 63600175 PHARM REV CODE 636 W HCPCS: Mod: PO | Performed by: NURSE ANESTHETIST, CERTIFIED REGISTERED

## 2023-03-31 PROCEDURE — 31267 PR NASAL/SINUS ENDOSCOPY,RMV TISS MAXILL SINUS: ICD-10-PCS | Mod: 51,RT,, | Performed by: OTOLARYNGOLOGY

## 2023-03-31 PROCEDURE — 37000008 HC ANESTHESIA 1ST 15 MINUTES: Mod: PO | Performed by: OTOLARYNGOLOGY

## 2023-03-31 PROCEDURE — 37000009 HC ANESTHESIA EA ADD 15 MINS: Mod: PO | Performed by: OTOLARYNGOLOGY

## 2023-03-31 PROCEDURE — 63600175 PHARM REV CODE 636 W HCPCS: Mod: PO | Performed by: ANESTHESIOLOGY

## 2023-03-31 PROCEDURE — 71000016 HC POSTOP RECOV ADDL HR: Mod: PO | Performed by: OTOLARYNGOLOGY

## 2023-03-31 PROCEDURE — 27201423 OPTIME MED/SURG SUP & DEVICES STERILE SUPPLY: Mod: PO | Performed by: OTOLARYNGOLOGY

## 2023-03-31 PROCEDURE — 63600175 PHARM REV CODE 636 W HCPCS: Mod: PO | Performed by: OTOLARYNGOLOGY

## 2023-03-31 PROCEDURE — D9220A PRA ANESTHESIA: Mod: ANES,,, | Performed by: ANESTHESIOLOGY

## 2023-03-31 PROCEDURE — 30520 REPAIR OF NASAL SEPTUM: CPT | Mod: ,,, | Performed by: OTOLARYNGOLOGY

## 2023-03-31 PROCEDURE — 31267 ENDOSCOPY MAXILLARY SINUS: CPT | Mod: 51,RT,, | Performed by: OTOLARYNGOLOGY

## 2023-03-31 PROCEDURE — 25000003 PHARM REV CODE 250: Mod: PO | Performed by: OTOLARYNGOLOGY

## 2023-03-31 PROCEDURE — D9220A PRA ANESTHESIA: ICD-10-PCS | Mod: ANES,,, | Performed by: ANESTHESIOLOGY

## 2023-03-31 PROCEDURE — D9220A PRA ANESTHESIA: ICD-10-PCS | Mod: CRNA,,, | Performed by: NURSE ANESTHETIST, CERTIFIED REGISTERED

## 2023-03-31 PROCEDURE — 88305 TISSUE EXAM BY PATHOLOGIST: CPT | Mod: 26,,, | Performed by: PATHOLOGY

## 2023-03-31 RX ORDER — SODIUM CHLORIDE, SODIUM LACTATE, POTASSIUM CHLORIDE, CALCIUM CHLORIDE 600; 310; 30; 20 MG/100ML; MG/100ML; MG/100ML; MG/100ML
INJECTION, SOLUTION INTRAVENOUS CONTINUOUS
Status: DISCONTINUED | OUTPATIENT
Start: 2023-03-31 | End: 2023-03-31 | Stop reason: HOSPADM

## 2023-03-31 RX ORDER — LIDOCAINE HYDROCHLORIDE 10 MG/ML
INJECTION INFILTRATION; PERINEURAL
Status: DISCONTINUED | OUTPATIENT
Start: 2023-03-31 | End: 2023-03-31 | Stop reason: HOSPADM

## 2023-03-31 RX ORDER — ACETAMINOPHEN 10 MG/ML
INJECTION, SOLUTION INTRAVENOUS
Status: DISCONTINUED | OUTPATIENT
Start: 2023-03-31 | End: 2023-03-31

## 2023-03-31 RX ORDER — ONDANSETRON 2 MG/ML
INJECTION INTRAMUSCULAR; INTRAVENOUS
Status: DISCONTINUED | OUTPATIENT
Start: 2023-03-31 | End: 2023-03-31

## 2023-03-31 RX ORDER — DEXAMETHASONE SODIUM PHOSPHATE 4 MG/ML
8 INJECTION, SOLUTION INTRA-ARTICULAR; INTRALESIONAL; INTRAMUSCULAR; INTRAVENOUS; SOFT TISSUE
Status: COMPLETED | OUTPATIENT
Start: 2023-03-31 | End: 2023-03-31

## 2023-03-31 RX ORDER — PROPOFOL 10 MG/ML
VIAL (ML) INTRAVENOUS
Status: DISCONTINUED | OUTPATIENT
Start: 2023-03-31 | End: 2023-03-31

## 2023-03-31 RX ORDER — LIDOCAINE HYDROCHLORIDE 10 MG/ML
1 INJECTION, SOLUTION EPIDURAL; INFILTRATION; INTRACAUDAL; PERINEURAL ONCE
Status: DISCONTINUED | OUTPATIENT
Start: 2023-03-31 | End: 2023-03-31 | Stop reason: HOSPADM

## 2023-03-31 RX ORDER — FENTANYL CITRATE 50 UG/ML
INJECTION, SOLUTION INTRAMUSCULAR; INTRAVENOUS
Status: DISCONTINUED | OUTPATIENT
Start: 2023-03-31 | End: 2023-03-31

## 2023-03-31 RX ORDER — FENTANYL CITRATE 50 UG/ML
25 INJECTION, SOLUTION INTRAMUSCULAR; INTRAVENOUS EVERY 5 MIN PRN
Status: DISCONTINUED | OUTPATIENT
Start: 2023-03-31 | End: 2023-03-31 | Stop reason: HOSPADM

## 2023-03-31 RX ORDER — MIDAZOLAM HYDROCHLORIDE 1 MG/ML
INJECTION INTRAMUSCULAR; INTRAVENOUS
Status: DISCONTINUED | OUTPATIENT
Start: 2023-03-31 | End: 2023-03-31

## 2023-03-31 RX ORDER — ONDANSETRON 4 MG/1
4 TABLET, ORALLY DISINTEGRATING ORAL EVERY 6 HOURS PRN
Qty: 10 TABLET | Refills: 0 | Status: SHIPPED | OUTPATIENT
Start: 2023-03-31 | End: 2023-06-05

## 2023-03-31 RX ORDER — DEXAMETHASONE SODIUM PHOSPHATE 4 MG/ML
INJECTION, SOLUTION INTRA-ARTICULAR; INTRALESIONAL; INTRAMUSCULAR; INTRAVENOUS; SOFT TISSUE
Status: DISCONTINUED | OUTPATIENT
Start: 2023-03-31 | End: 2023-03-31

## 2023-03-31 RX ORDER — EPINEPHRINE 1 MG/ML
INJECTION INTRAMUSCULAR; INTRAVENOUS; SUBCUTANEOUS
Status: DISCONTINUED | OUTPATIENT
Start: 2023-03-31 | End: 2023-03-31 | Stop reason: HOSPADM

## 2023-03-31 RX ORDER — MUPIROCIN 20 MG/G
OINTMENT TOPICAL
Status: DISCONTINUED | OUTPATIENT
Start: 2023-03-31 | End: 2023-03-31 | Stop reason: HOSPADM

## 2023-03-31 RX ORDER — HYDROCODONE BITARTRATE AND ACETAMINOPHEN 5; 325 MG/1; MG/1
1 TABLET ORAL EVERY 4 HOURS PRN
Qty: 15 TABLET | Refills: 0 | Status: SHIPPED | OUTPATIENT
Start: 2023-03-31 | End: 2023-06-05

## 2023-03-31 RX ORDER — ROCURONIUM BROMIDE 10 MG/ML
INJECTION, SOLUTION INTRAVENOUS
Status: DISCONTINUED | OUTPATIENT
Start: 2023-03-31 | End: 2023-03-31

## 2023-03-31 RX ORDER — EPHEDRINE SULFATE 50 MG/ML
INJECTION, SOLUTION INTRAVENOUS
Status: DISCONTINUED | OUTPATIENT
Start: 2023-03-31 | End: 2023-03-31

## 2023-03-31 RX ORDER — LIDOCAINE HYDROCHLORIDE 20 MG/ML
INJECTION INTRAVENOUS
Status: DISCONTINUED | OUTPATIENT
Start: 2023-03-31 | End: 2023-03-31

## 2023-03-31 RX ADMIN — DEXAMETHASONE SODIUM PHOSPHATE 4 MG: 4 INJECTION, SOLUTION INTRAMUSCULAR; INTRAVENOUS at 11:03

## 2023-03-31 RX ADMIN — ACETAMINOPHEN 1000 MG: 10 INJECTION, SOLUTION INTRAVENOUS at 11:03

## 2023-03-31 RX ADMIN — ROCURONIUM BROMIDE 40 MG: 10 INJECTION, SOLUTION INTRAVENOUS at 10:03

## 2023-03-31 RX ADMIN — EPHEDRINE SULFATE 5 MG: 50 INJECTION INTRAVENOUS at 11:03

## 2023-03-31 RX ADMIN — SODIUM CHLORIDE, POTASSIUM CHLORIDE, SODIUM LACTATE AND CALCIUM CHLORIDE: 600; 310; 30; 20 INJECTION, SOLUTION INTRAVENOUS at 10:03

## 2023-03-31 RX ADMIN — ONDANSETRON 4 MG: 2 INJECTION, SOLUTION INTRAMUSCULAR; INTRAVENOUS at 12:03

## 2023-03-31 RX ADMIN — SODIUM CHLORIDE, POTASSIUM CHLORIDE, SODIUM LACTATE AND CALCIUM CHLORIDE: 600; 310; 30; 20 INJECTION, SOLUTION INTRAVENOUS at 11:03

## 2023-03-31 RX ADMIN — LIDOCAINE HYDROCHLORIDE 100 MG: 20 INJECTION INTRAVENOUS at 10:03

## 2023-03-31 RX ADMIN — SUGAMMADEX 200 MG: 100 INJECTION, SOLUTION INTRAVENOUS at 12:03

## 2023-03-31 RX ADMIN — MIDAZOLAM HYDROCHLORIDE 2 MG: 1 INJECTION, SOLUTION INTRAMUSCULAR; INTRAVENOUS at 10:03

## 2023-03-31 RX ADMIN — ROCURONIUM BROMIDE 10 MG: 10 INJECTION, SOLUTION INTRAVENOUS at 10:03

## 2023-03-31 RX ADMIN — PROPOFOL 175 MG: 10 INJECTION, EMULSION INTRAVENOUS at 10:03

## 2023-03-31 RX ADMIN — FENTANYL CITRATE 100 MCG: 50 INJECTION, SOLUTION INTRAMUSCULAR; INTRAVENOUS at 10:03

## 2023-03-31 RX ADMIN — FENTANYL CITRATE 25 MCG: 50 INJECTION INTRAMUSCULAR; INTRAVENOUS at 12:03

## 2023-03-31 RX ADMIN — DEXAMETHASONE SODIUM PHOSPHATE 8 MG: 4 INJECTION INTRA-ARTICULAR; INTRALESIONAL; INTRAMUSCULAR; INTRAVENOUS; SOFT TISSUE at 10:03

## 2023-03-31 NOTE — TRANSFER OF CARE
"Anesthesia Transfer of Care Note    Patient: Osbaldo Brewer    Procedure(s) Performed: Procedure(s) (LRB):  SINUS SURGERY FUNCTIONAL ENDOSCOPIC WITH NAVIGATION (Bilateral)  SEPTOPLASTY (Bilateral)    Patient location: PACU    Anesthesia Type: general    Transport from OR: Transported from OR on room air with adequate spontaneous ventilation    Post pain: adequate analgesia    Post assessment: no apparent anesthetic complications and tolerated procedure well    Post vital signs: stable    Level of consciousness: sedated and awake    Nausea/Vomiting: no nausea/vomiting    Complications: none    Transfer of care protocol was followed      Last vitals:   Visit Vitals  /68   Pulse 73   Temp 36.7 °C (98 °F)   Resp 18   Ht 6' 2" (1.88 m)   Wt 93 kg (205 lb)   SpO2 100%   BMI 26.32 kg/m²     "

## 2023-03-31 NOTE — PATIENT INSTRUCTIONS
Post-op Sinus / Nasal Surgery  Neville Navarrete MD  Department of Otolaryngology, Head and Neck Surgery  [facility]      PHONE NUMBERS:    Office number: [phone]  EMERGENCY: call 911  URGENT ISSUES or after hours: [phone]  My cell phone: [phone]    WHAT TO DO    1. You should follow-up on:  1 week  Please call [phone] to set up a specific time or to change dates.  Call me ASAP if you are experiencing any unexpected problems.  2. Use the recommended medications / treatments as described  3. Familiarize yourself with the information in this pamphlet  4. Call me with questions. Also, please call me the day after surgery to let me know how you are doing.  5.  Cell phone: [phone], please call me with any concerns or questions. I am not always available to answer my cell phone, so for any urgent or important issues, use the other numbers provided above.    WHAT TO EXPECT    Nasal Discharge & Bleeding  It is common to have mild bleeding and nasal drainage after nasal / sinus surgery.  After surgery, a drip pad may have been placed under your nose. You may remove this at any point and can replace it if necessary at your discretion.    Pain & Pressure  It is common to experience mild-to- moderate pain and pressure in your face and around your eyes.  The pain usually is worst the first day after surgery.  Use your medications as described below.  For any severe pain, please contact me or present to the emergency room.    Nasal Congestion & Crusting  It is common to experience nasal congestion after surgery. This is due to swelling and scabs. DO NOT BLOW YOUR NOSE until I say it is OK.  Crusts are essentially scabs and mucus that build up in the nasal passages after surgery.  Crusts eventually resolve. Usually, I will remove some crusts during your postoperative visit.  I want you to rinse your nose at least 3-4 times daily, more if you would like.   Nasal irrigation  helps to soften and remove scabs. Start irrigation NOW  Nasal irrigation kits are available over the counter in the nasal section. Common brands include SinuCleanse or NeilMed.    Nasal Irrigations  This will help remove the allergens, debris, and mucous from your nose to help you breathe. It will also clear it in preparation for other nasal medications.    To perform, purchase an over the counter sinus irrigation kit such as the NeilMed Sinus Rinse Kit. Use as directed on the box. You should use distilled water or water that was previously boiled and left to cool. If you wish, you may make your own solution. However, salt packets are available in the nasal section in your  drug store.     A rough estimate for making salt solution is:  8oz water  2 teaspoons salt (pickling, viktoria or Kosher salt)  1 teaspoon baking soda    After each use, rinse the bottle with small amount of rubbing alcohol and clean with soap.  Replace the irrigation bottle if it becomes visibly soiled or every few weeks.      Fatigue  It is common to feel mild to moderate fatigue for 7-10 days after surgery.    ACTIVITY    First 1-2 days after surgery  Plan to rest. You may start light activities as tolerated.  Days 2 though 10 following surgery  Light activity only until 10 days after surgery, gradually increase activity.  No extensive bending over for 7 days after surgery.  No lifting over 20 pounds for one week after surgery  You may shower starting 1 day after surgery    NUTRITION    Day of surgery  Drink plenty of water / fluids  Eat light snacks as tolerated  It is common for people to have less of an appetite the day of surgery    Day after surgery  Advance your diet as tolerated    MEDICATIONS    Norco (hydrocodone): take one to two every 4-6 hours as needed for pain  [place] law now prohibits prescription of more than 1 week of narcotic pain medication. You should wean from narcotic pain medication by that point, but it not, you will  need to come to have a refill at that point approved by your doctor.  Oral antibiotics: none  Mupirocin ointment: place a pea-sized amount in your nose twice daily for 5 days.  NOTE: please take an over the counter stool softener while you are on pain medications - they may cause constipation.    MEDICATION SAFETY TIPS    Use medications only as directed in the amounts specified  Avoid aspirin, ibuprofen, naproxen, and related pain medications for 10 days. Avoid fish oil (omega acids) and vitamin E for 1 week.  As your pain lessens, you may Replace doses of prescription pain medications with acetaminophen (Tylenol). However, Do Not take doses of prescription pain medications at the same time as Tylenol because this could cause an overdose of Tylenol.  Do Not drive or operate machinery if taking prescription pain medications  Read each medication label carefully, ask your pharmacist if you have any questions regarding warnings on the label  Do not measure liquid with a kitchen spoon. There are pediatric measuring devices available at the pharmacy. Ask for one when you get your prescription filled.  Store all mediations out of reach of children.    Call my office if you experience any of the following:    Fever higher than 101 F  Clear, watery nasal discharge  Any visual changes or marked swelling around the eyes  Severe headache or neck stiffness  Brisk bleeding

## 2023-03-31 NOTE — OP NOTE
03/31/2023     Name: Osbaldo Brewer   MRN: [id number]   YOB: 1960     Pre-procedure diagnoses:  1. Chronic maxillary sinusitis    2. Nasal septal deviation         Post-procedure diagnoses:  1. Chronic maxillary sinusitis    2. Nasal septal deviation          Procedures performed  Septoplasty  Right Maxillary Antrostomy with removal of tissue  Stereotactic Navigation for 1 hours    Surgeon: Neville Navarrete  Assistants: None    Anesthesia: General, Endotracheal    Specimens:  Sinus contents    Complications: None apparent    Blood Loss: 15 cc    Operative findings:  Moderate right deviation limiting access to middle meatus, septoplasty performed. Residual uncinate with complete obstruction / synechiae of natural maxillary osLarge antrostomy created.  Implants: Novapak    Disposition: PACU    Indications:  Osbaldo Brewer is a 62 y.o. male who was seen in clinic for evaluation of the above diagnosis. He underwent FESS > 1 year ago and has never improved - in fact he worsened following this. He has failed medical management. Based on clinical assessment, the following procedures were discussed as an option for treatment. After risks, benefits, and alternatives were discussed the patient decided to proceed. Specific risks that were discussed with the patient included anesthesia risks, scarring, recurrence, bleeding, persistence of disease, change in smell, change in / loss of vision, CSF leak / meningitis, septal perforation, epiphora.    Procedure in detail:  The patient was seen in the pre-operative holding area, and consent was signed. They were wheeled into the operating room and placed supine on the table. Anesthesia was induced via endotracheal tube. Pledgets soaked in 1:10,000 Epinephrine were used for decongestion, being mindful to monitor vitals during the procedure. The patient was prepped and draped in the usual fashion. A mixture of 0 and angled degree telescopes were used  during the procedure.    1% Lidocaine with 1:100,000 epinephrine was used for injection into the ground lamella insertion of the middle turbinate.     Septoplasty: A right sided shanelle transfixion incision was made and I dissected into the avascular subperichondrial plane. The mucoperichondrial flap was elevated beyond the bony-cartilaginous junction. I then made a cartilaginous incision, preserving > 1 cm of L-strut for nasal support. I raised the contralateral mucoperichondrial flap and isolated the septum. I then removed deviated portions of the cartilaginous and bony septum. Care was taken to be mindful of the nasal floor and skull base. Once all of the deviated portions were removed, the flaps were irrigated to remove loose bony chips. There was no perforation or flap rent noted. A 4-0 plaint gut was used to close the shanelle transfixion incision and then a whipstitch was thrown to re approximate the flaps.    Right Maxillary antrostomy with removal of tissue: I began on the right side. The middle turbinate was gently medialized, exposing the uncinate process. The uncinate was almost almost entirely intact with a large surgical os posterior to the this. I divided the horizontal and vertical portions of the uncinate and performed an uncinectomy, removing first the vertical uncinate up to it's superior insertion, then horizontal bone anterior until the maxillary ridge. I was able to identify a completely scarred maxillary is with a large amount of thick mucous recirculating through the scar. Care was taken to avoid the injury to the nasolacrimal duct. The natural maxillary os was opened and connected with the previous surgical os. I removed thick mucous from the right sinus. The mucosa of the sinus was normal appearing. I then used the FX Bridge hydrodebrider to wash out the sinus. Bleeding was minimal    Novapak was placed in the middle meatus.     The wound was copiously irrigated. This marked the end of the  procedure. The patient was turned back over to the Anesthesia team.  They were awoken and transported back to the PACU in stable condition. Counts were correct. I performed the entire procedure.

## 2023-03-31 NOTE — BRIEF OP NOTE
[facility] - Surgery  Brief Operative Note     SUMMARY     Surgery Date: 3/31/2023     Surgeon(s) and Role:     * Neville Navarrete MD - Primary    Assisting Surgeon: None    Pre-op Diagnosis:  Chronic maxillary sinusitis [J32.0]  History of sinus surgery [Z98.890]  Recurrent sinus infections [J32.9]  Nasal septal deviation [J34.2]    Post-op Diagnosis:  Post-Op Diagnosis Codes:     * Chronic maxillary sinusitis [J32.0]     * History of sinus surgery [Z98.890]     * Recurrent sinus infections [J32.9]     * Nasal septal deviation [J34.2]    Procedure(s) (LRB):  SINUS SURGERY FUNCTIONAL ENDOSCOPIC WITH NAVIGATION (Bilateral)  SEPTOPLASTY (Bilateral)    Anesthesia: General    Description of the findings of the procedure: septoplasty, right max with removal    Findings/Key Components: septoplasty, right maxillary antrostomy with removal    Estimated Blood Loss: * No values recorded between 3/31/2023 11:15 AM and 3/31/2023 12:19 PM *         Specimens:   Specimen (24h ago, onward)      None            Discharge Note    SUMMARY     Admit Date: 3/31/2023    Discharge Date and Time:  03/31/2023 12:19 PM    Hospital Course (synopsis of major diagnoses, care, treatment, and services provided during the course of the hospital stay): Did well following surgery and was discharged uneventfully     Final Diagnosis: Post-Op Diagnosis Codes:     * Chronic maxillary sinusitis [J32.0]     * History of sinus surgery [Z98.890]     * Recurrent sinus infections [J32.9]     * Nasal septal deviation [J34.2]    Disposition: Home or Self Care    Follow Up/Patient Instructions: Regular diet, Follow-up 1 week. Activity light    Medications:  Reconciled Home Medications:   Current Discharge Medication List        CONTINUE these medications which have NOT CHANGED    Details   atorvastatin (LIPITOR) 40 MG tablet Take 40 mg by mouth once daily.      azelastine (ASTELIN) 137 mcg (0.1 %) nasal spray 1 spray by Nasal route 2 (two) times daily.       BUDESONIDE 0.6 MG/GENTAMICIN 50 ML NORMAL SALINE NASAL IRRIGATION ADD 1ML (0.5ML TWICE) OF MEDICATION TO 250ML OF SALINE IN SALINE IRRIGATION BOTTLE; IRRIGATE SINUSES WITH 120ML THROUGH EACH NOSTRIL TWICE DAILY      busPIRone (BUSPAR) 5 MG Tab Take 5-7.5 mg po TID as needed for anxiety or sleep. Generic  Qty: 30 tablet, Refills: 1    Associated Diagnoses: Anxiety; Reactive depression; Sleep disorder      COQ10, UBIQUINOL, ORAL Take by mouth.      levocetirizine (XYZAL) 5 MG tablet Take 5 mg by mouth 3 (three) times a week.      levothyroxine (SYNTHROID) 25 MCG tablet Take 1 tablet (25 mcg total) by mouth before breakfast. Generic  Qty: 30 tablet, Refills: 2    Associated Diagnoses: Hypothyroidism, unspecified type      cetirizine (ZYRTEC) 10 MG tablet Take 10 mg by mouth twice a week.      diazePAM (VALIUM) 5 MG tablet Take 1 tablet (5 mg total) by mouth 1 (one) time if needed for Anxiety.  Qty: 1 tablet, Refills: 0      fluticasone propionate (FLONASE) 50 mcg/actuation nasal spray 1 spray by Each Nostril route once daily.      loratadine (CLARITIN) 10 mg tablet Take 10 mg by mouth twice a week.      tadalafiL (CIALIS) 5 MG tablet Take 1 tablet (5 mg total) by mouth daily as needed for Erectile Dysfunction.  Qty: 90 tablet, Refills: 0    Associated Diagnoses: BPH associated with nocturia; Erectile dysfunction, unspecified erectile dysfunction type      traZODone (DESYREL) 50 MG tablet Take 1 tablet (50 mg total) by mouth every evening. Generic  Qty: 30 tablet, Refills: 2    Associated Diagnoses: Anxiety; Reactive depression; Sleep disorder           No discharge procedures on file.

## 2023-03-31 NOTE — ANESTHESIA PROCEDURE NOTES
Intubation    Date/Time: 3/31/2023 11:00 AM  Performed by: Soren Garcia CRNA  Authorized by: Corey Robledo MD     Intubation:     Induction:  Intravenous    Intubated:  Postinduction    Mask Ventilation:  Easy mask    Attempts:  1    Attempted By:  CRNA    Method of Intubation:  Video laryngoscopy    Blade:  Beyer 4    Laryngeal View Grade: Grade I - full view of cords      Difficult Airway Encountered?: No      Complications:  None    Airway Device:  Oral endotracheal tube    Airway Device Size:  8.0    Style/Cuff Inflation:  Cuffed (inflated to minimal occlusive pressure)    Tube secured:  23    Secured at:  The lips    Placement Verified By:  Capnometry    Complicating Factors:  Anterior larynx    Findings Post-Intubation:  BS equal bilateral and atraumatic/condition of teeth unchanged

## 2023-03-31 NOTE — ANESTHESIA PREPROCEDURE EVALUATION
03/31/2023  Osbaldo Brewer is a 62 y.o., male.      Pre-op Assessment    I have reviewed the Patient Summary Reports.     I have reviewed the Nursing Notes. I have reviewed the NPO Status.   I have reviewed the Medications.     Review of Systems  Anesthesia Hx:  Denies Family Hx of Anesthesia complications.   Denies Personal Hx of Anesthesia complications.   EENT/Dental:   Chronic sinusitis   Cardiovascular:   hyperlipidemia ECG has been reviewed.    Renal/:   BPH    Hepatic/GI:   PUD,    Endocrine:   Hypothyroidism immunodeficiency    Psych:   Psychiatric History          Physical Exam  General: Well nourished, Cooperative, Alert and Oriented    Airway:  Mallampati: IV / III  Mouth Opening: Small, but > 3cm  TM Distance: 4 - 6 cm  Tongue: Normal  Neck ROM: Extension Decreased    Dental:  Caps / Implants        Anesthesia Plan  Type of Anesthesia, risks & benefits discussed:    Anesthesia Type: Gen ETT  Intra-op Monitoring Plan: Standard ASA Monitors  Post Op Pain Control Plan: multimodal analgesia  Induction:  IV  Airway Plan: Video  Informed Consent: Informed consent signed with the Patient and all parties understand the risks and agree with anesthesia plan.  All questions answered.   ASA Score: 2    Ready For Surgery From Anesthesia Perspective.     .

## 2023-04-04 NOTE — ANESTHESIA POSTPROCEDURE EVALUATION
Anesthesia Post Evaluation    Patient: Osbaldo Brewer    Procedure(s) Performed: Procedure(s) (LRB):  SINUS SURGERY FUNCTIONAL ENDOSCOPIC WITH NAVIGATION (Bilateral)  SEPTOPLASTY (Bilateral)    Final Anesthesia Type: general      Patient location during evaluation: PACU  Patient participation: Yes- Able to Participate  Level of consciousness: awake and alert and oriented  Post-procedure vital signs: reviewed and stable  Pain management: adequate  Airway patency: patent    PONV status at discharge: No PONV  Anesthetic complications: no      Cardiovascular status: blood pressure returned to baseline and stable  Respiratory status: unassisted and spontaneous ventilation  Hydration status: euvolemic  Follow-up not needed.          Vitals Value Taken Time   /69 03/31/23 1320   Temp 36.7 °C (98 °F) 03/31/23 1226   Pulse 77 03/31/23 1320   Resp 16 03/31/23 1320   SpO2 96 % 03/31/23 1320         Event Time   Out of Recovery 12:57:49         Pain/Tamara Score: No data recorded

## 2023-04-06 ENCOUNTER — TELEPHONE (OUTPATIENT)
Dept: OTOLARYNGOLOGY | Facility: CLINIC | Age: 63
End: 2023-04-06
Payer: COMMERCIAL

## 2023-04-06 LAB
FINAL PATHOLOGIC DIAGNOSIS: NORMAL
Lab: NORMAL

## 2023-04-06 NOTE — TELEPHONE ENCOUNTER
----- Message from Neville Navarrete MD sent at 4/6/2023  3:26 PM CDT -----  I didn't realize he didn't have a follow-up I'd like to see him on Monday please.    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 This could have been part of the reason you had persistent issues. No additional treatment is needed for this, but we will need to monitor for any recurrence over time.     Neville Navarrete MD  Otolaryngology - Head and Neck Surgery  Office: [phone]  Cell: [phone]  Fax: [phone]    This message was generated using voice dictation. Please excuse any errors that may have been created by the transcription software.

## 2023-04-10 ENCOUNTER — OFFICE VISIT (OUTPATIENT)
Dept: OTOLARYNGOLOGY | Facility: CLINIC | Age: 63
End: 2023-04-10
Payer: COMMERCIAL

## 2023-04-10 ENCOUNTER — TELEPHONE (OUTPATIENT)
Dept: DERMATOLOGY | Facility: CLINIC | Age: 63
End: 2023-04-10
Payer: COMMERCIAL

## 2023-04-10 ENCOUNTER — TELEPHONE (OUTPATIENT)
Dept: FAMILY MEDICINE | Facility: CLINIC | Age: 63
End: 2023-04-10
Payer: COMMERCIAL

## 2023-04-10 VITALS — HEIGHT: 74 IN | WEIGHT: 205.25 LBS | BODY MASS INDEX: 26.34 KG/M2

## 2023-04-10 DIAGNOSIS — D14.0 INVERTED PAPILLOMA OF LATERAL NASAL WALL: ICD-10-CM

## 2023-04-10 DIAGNOSIS — J32.0 CHRONIC MAXILLARY SINUSITIS: ICD-10-CM

## 2023-04-10 DIAGNOSIS — J34.89 NASAL CRUSTING: Primary | ICD-10-CM

## 2023-04-10 PROCEDURE — 3044F HG A1C LEVEL LT 7.0%: CPT | Mod: CPTII,S$GLB,, | Performed by: OTOLARYNGOLOGY

## 2023-04-10 PROCEDURE — 99499 NO LOS: ICD-10-PCS | Mod: S$GLB,,, | Performed by: OTOLARYNGOLOGY

## 2023-04-10 PROCEDURE — 1159F MED LIST DOCD IN RCRD: CPT | Mod: CPTII,S$GLB,, | Performed by: OTOLARYNGOLOGY

## 2023-04-10 PROCEDURE — 1160F PR REVIEW ALL MEDS BY PRESCRIBER/CLIN PHARMACIST DOCUMENTED: ICD-10-PCS | Mod: CPTII,S$GLB,, | Performed by: OTOLARYNGOLOGY

## 2023-04-10 PROCEDURE — 3008F PR BODY MASS INDEX (BMI) DOCUMENTED: ICD-10-PCS | Mod: CPTII,S$GLB,, | Performed by: OTOLARYNGOLOGY

## 2023-04-10 PROCEDURE — 31237 NSL/SINS NDSC SURG BX POLYPC: CPT | Mod: 79,RT,S$GLB, | Performed by: OTOLARYNGOLOGY

## 2023-04-10 PROCEDURE — 99999 PR PBB SHADOW E&M-EST. PATIENT-LVL III: CPT | Mod: PBBFAC,,, | Performed by: OTOLARYNGOLOGY

## 2023-04-10 PROCEDURE — 3008F BODY MASS INDEX DOCD: CPT | Mod: CPTII,S$GLB,, | Performed by: OTOLARYNGOLOGY

## 2023-04-10 PROCEDURE — 99499 UNLISTED E&M SERVICE: CPT | Mod: S$GLB,,, | Performed by: OTOLARYNGOLOGY

## 2023-04-10 PROCEDURE — 99999 PR PBB SHADOW E&M-EST. PATIENT-LVL III: ICD-10-PCS | Mod: PBBFAC,,, | Performed by: OTOLARYNGOLOGY

## 2023-04-10 PROCEDURE — 31237 PR NASAL/SINUS ENDOSCOPY,BX/RMV POLYP/DEBRID: ICD-10-PCS | Mod: 79,RT,S$GLB, | Performed by: OTOLARYNGOLOGY

## 2023-04-10 PROCEDURE — 3044F PR MOST RECENT HEMOGLOBIN A1C LEVEL <7.0%: ICD-10-PCS | Mod: CPTII,S$GLB,, | Performed by: OTOLARYNGOLOGY

## 2023-04-10 PROCEDURE — 1160F RVW MEDS BY RX/DR IN RCRD: CPT | Mod: CPTII,S$GLB,, | Performed by: OTOLARYNGOLOGY

## 2023-04-10 PROCEDURE — 1159F PR MEDICATION LIST DOCUMENTED IN MEDICAL RECORD: ICD-10-PCS | Mod: CPTII,S$GLB,, | Performed by: OTOLARYNGOLOGY

## 2023-04-10 NOTE — TELEPHONE ENCOUNTER
----- Message from Lucila Pena sent at 4/10/2023 12:13 PM CDT -----  Regarding: pt call bk  Name of Who is Calling:KAITLIN SANTANA [[id number]]        What is the request in detail: Pt requesting establish care for next year please advise           Can the clinic reply by [facility]: no         What Number to Call Back if not in [facility]: Telephone Information:  Mobile          [phone]

## 2023-04-10 NOTE — Clinical Note
Just keeping you in the loop. I took Osbaldo for a revision surgery. He had an obstructed sinus like I suspected. 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 The treatment is surgery, so for now I am monitoring to see if all was removed.

## 2023-04-10 NOTE — TELEPHONE ENCOUNTER
Returned patient call and told them that [facility] is not accepting new patients at this time, I told her we could send them a list of dr. Perez of [facility], but they declined. They want to be put on a waiting list for when they are accepting new patients, especially for Dr. Shannon.

## 2023-04-10 NOTE — LETTER
April 18, 2023      Yokasta Alvarez MD  [address] [facility]  [address]  [facility] [address]           [place] - ENT  [address] [place] [address]  Phone: [phone]  Fax: [phone]          Patient: Osbaldo Brewer   MR Number: [id number]   YOB: 1960   Date of Visit: 4/10/2023       Dear No ref. provider found:    Thank you for referring Osbaldo Brewer to me for evaluation. Attached you will find relevant portions of my assessment and plan of care.    If you have questions, please do not hesitate to call me. I look forward to following Osbaldo Brewer along with you.    Sincerely,    Neville Navarrete MD    Enclosure  CC:    No Recipients    If you would like to receive this communication electronically, please contact [email or web address] or [phone] to request more information on [facility] access.    For providers and/or their staff who would like to refer a patient to [facility], please contact us through our one-stop-shop provider referral line, [facility], at [phone].    If you feel you have received this communication in error or would no longer like to receive these types of communications, please e-mail [email or web address]

## 2023-04-11 ENCOUNTER — PATIENT MESSAGE (OUTPATIENT)
Dept: ADMINISTRATIVE | Facility: HOSPITAL | Age: 63
End: 2023-04-11
Payer: COMMERCIAL

## 2023-04-11 DIAGNOSIS — E03.9 HYPOTHYROIDISM, UNSPECIFIED TYPE: ICD-10-CM

## 2023-04-11 NOTE — TELEPHONE ENCOUNTER
No new care gaps identified.  [facility] Embedded Care Gaps. Reference number: [id number]. 4/11/2023   9:31:16 AM CDT

## 2023-04-11 NOTE — TELEPHONE ENCOUNTER
Refill Routing Note   Medication(s) are not appropriate for processing by [facility] for the following reason(s):      New or recently adjusted medication    [facility] action(s):  Defer            Appointments  past 12m or future 3m with PCP    Date Provider   Last Visit   3/10/2023 Chava Mullins MD   Next Visit   Visit date not found Chava Mullins MD   ED visits in past 90 days: 0        Note composed:11:55 AM 04/11/2023

## 2023-04-13 ENCOUNTER — TELEPHONE (OUTPATIENT)
Dept: FAMILY MEDICINE | Facility: CLINIC | Age: 63
End: 2023-04-13
Payer: COMMERCIAL

## 2023-04-13 ENCOUNTER — IMMUNIZATION (OUTPATIENT)
Dept: PHARMACY | Facility: CLINIC | Age: 63
End: 2023-04-13
Payer: COMMERCIAL

## 2023-04-13 RX ORDER — LEVOTHYROXINE SODIUM 25 UG/1
TABLET ORAL
Qty: 90 TABLET | Refills: 3 | Status: SHIPPED | OUTPATIENT
Start: 2023-04-13 | End: 2023-05-09

## 2023-04-13 NOTE — TELEPHONE ENCOUNTER
----- Message from Sybil Washington sent at 4/13/2023 11:09 AM CDT -----  Contact: Pt's spouse/ Trang  Type:  Needs Medical Advice    Who Called: Pt's spouse/ Trang  Would the patient rather a call back or a response via [facility]? call  Best Call Back Number: [phone]  Additional Information: Pt's spouse has an appt on 06/05/2023, and would like her  to be seen on the same day. He would be a New Patient, and wants to get his Annual. Please call pt back to advise.

## 2023-04-13 NOTE — TELEPHONE ENCOUNTER
Called pt wife back. Trang is wanting patient to have appointment the same day that she does due to them having to commute from [place]. I informed Trang that I was unable to schedule patient on the same day due to us not being allowed to manually override appointments any longer.

## 2023-04-14 ENCOUNTER — CLINICAL SUPPORT (OUTPATIENT)
Dept: CARDIOLOGY | Facility: HOSPITAL | Age: 63
End: 2023-04-14
Attending: INTERNAL MEDICINE
Payer: COMMERCIAL

## 2023-04-14 ENCOUNTER — TELEPHONE (OUTPATIENT)
Dept: DERMATOLOGY | Facility: CLINIC | Age: 63
End: 2023-04-14
Payer: COMMERCIAL

## 2023-04-14 VITALS
BODY MASS INDEX: 26.31 KG/M2 | WEIGHT: 205 LBS | SYSTOLIC BLOOD PRESSURE: 133 MMHG | DIASTOLIC BLOOD PRESSURE: 69 MMHG | HEIGHT: 74 IN

## 2023-04-14 DIAGNOSIS — Z01.30 ENCOUNTER FOR EXAMINATION OF BLOOD PRESSURE WITHOUT ABNORMAL FINDINGS: ICD-10-CM

## 2023-04-14 LAB
ASCENDING AORTA: 3 CM
AV INDEX (PROSTH): 0.91
AV MEAN GRADIENT: 2 MMHG
AV PEAK GRADIENT: 4 MMHG
AV VALVE AREA: 3.05 CM2
AV VELOCITY RATIO: 0.91
BSA FOR ECHO PROCEDURE: 2.2 M2
CV ECHO LV RWT: 0.25 CM
DOP CALC AO PEAK VEL: 1.06 M/S
DOP CALC AO VTI: 23.9 CM
DOP CALC LVOT AREA: 3.4 CM2
DOP CALC LVOT DIAMETER: 2.07 CM
DOP CALC LVOT PEAK VEL: 0.96 M/S
DOP CALC LVOT STROKE VOLUME: 72.99 CM3
DOP CALCLVOT PEAK VEL VTI: 21.7 CM
E WAVE DECELERATION TIME: 158.86 MSEC
E/A RATIO: 0.83
E/E' RATIO: 5.75 M/S
ECHO LV POSTERIOR WALL: 0.74 CM (ref 0.6–1.1)
EJECTION FRACTION: 60 %
FRACTIONAL SHORTENING: 43 % (ref 28–44)
INTERVENTRICULAR SEPTUM: 0.72 CM (ref 0.6–1.1)
LA MAJOR: 3.97 CM
LA MINOR: 4.06 CM
LA WIDTH: 4.1 CM
LEFT ATRIUM SIZE: 3.98 CM
LEFT ATRIUM VOLUME INDEX: 25.3 ML/M2
LEFT ATRIUM VOLUME: 55.68 CM3
LEFT INTERNAL DIMENSION IN SYSTOLE: 3.32 CM (ref 2.1–4)
LEFT VENTRICLE DIASTOLIC VOLUME INDEX: 76.46 ML/M2
LEFT VENTRICLE DIASTOLIC VOLUME: 168.22 ML
LEFT VENTRICLE MASS INDEX: 72 G/M2
LEFT VENTRICLE SYSTOLIC VOLUME INDEX: 20.4 ML/M2
LEFT VENTRICLE SYSTOLIC VOLUME: 44.9 ML
LEFT VENTRICULAR INTERNAL DIMENSION IN DIASTOLE: 5.83 CM (ref 3.5–6)
LEFT VENTRICULAR MASS: 158.07 G
LV LATERAL E/E' RATIO: 4.93 M/S
LV SEPTAL E/E' RATIO: 6.9 M/S
LVOT MG: 1.99 MMHG
LVOT MV: 0.66 CM/S
MV PEAK A VEL: 0.83 M/S
MV PEAK E VEL: 0.69 M/S
MV STENOSIS PRESSURE HALF TIME: 46.07 MS
MV VALVE AREA P 1/2 METHOD: 4.78 CM2
PISA TR MAX VEL: 2.01 M/S
RA MAJOR: 3.58 CM
RA PRESSURE: 3 MMHG
RIGHT VENTRICULAR END-DIASTOLIC DIMENSION: 3.78 CM
RV MID DIAMA: 2.36 CM
RV TISSUE DOPPLER FREE WALL SYSTOLIC VELOCITY 1 (APICAL 4 CHAMBER VIEW): 0.01 CM/S
SINUS: 3.11 CM
STJ: 3 CM
TDI LATERAL: 0.14 M/S
TDI SEPTAL: 0.1 M/S
TDI: 0.12 M/S
TR MAX PG: 16 MMHG
TRICUSPID ANNULAR PLANE SYSTOLIC EXCURSION: 2.88 CM
TV REST PULMONARY ARTERY PRESSURE: 19 MMHG

## 2023-04-14 PROCEDURE — 93306 TTE W/DOPPLER COMPLETE: CPT | Mod: PO

## 2023-04-14 PROCEDURE — 93306 ECHO (CUPID ONLY): ICD-10-PCS | Mod: 26,,, | Performed by: INTERNAL MEDICINE

## 2023-04-14 PROCEDURE — 93306 TTE W/DOPPLER COMPLETE: CPT | Mod: 26,,, | Performed by: INTERNAL MEDICINE

## 2023-04-14 NOTE — TELEPHONE ENCOUNTER
Called patient, he needs to be seen soon. I told him that I would send him a list through his portal of Dermatologist outside of [facility].

## 2023-04-18 NOTE — PROGRESS NOTES
Here for post-operative visit #1   S/p   Procedures performed  Septoplasty  Right Maxillary Antrostomy with removal of tissue  Stereotactic Navigation for 1 hours    Operative findings:  Moderate right deviation limiting access to middle meatus, septoplasty performed. Residual uncinate with complete obstruction / synechiae of natural maxillary osLarge antrostomy created.  Implants: Novapak    Final pathology revealed incidental inverted papilloma      Nasal Endoscopy with Debridement    Nasal endoscopy with debridement was performed in accordance with the 0 day global period for endoscopic sinus surgery and are unrelated to any other recently performed procedures.     Topical Agents: Topical 0.25% phenylephrine and 4% lidocaine    A 30 degree rigid nasal endoscope was inserted into the nose to visualize the nasal passageway and paranasal sinuses. Under endoscopic visualization, a combination of instruments including suction and grasping forceps were used to debride crust, debris, inflammatory tissue and nasal polyps from the nasal cavity, paranasal sinuses and sinus drainage pathways. This was performed to aid in healing and optimize the patency and function of the sinus cavities and nasal passageways. This is necessary to avoid scar formation, infection, and mucocele formation. In addition, this facilitates in the optimal instillation of topical therapies, saline irrigations, long-term disease surveillance, and endoscopically-derived cultures. Examination of the inferior turbinates, middle turbinates, middle meatus, operated sinuses, sphenoethmoidal recess, and nasopharynx was undertaken. All findings were normal for this stage in healing and included:    LEFT: N/A  RIGHT: Novapak and clot within middle meatus removed. Friable mucosa along lamina and uncinectomy.Os patent.         Assessment & Plan:  - Healing as expected  - Discussed pathology -will need surveillance. Intra-op findings did not show a mass so need  to monitor if any persistent disease along lamina or medial wall of maxillary sinus  - Continue saline  - RTC 2 weeks

## 2023-04-24 ENCOUNTER — OFFICE VISIT (OUTPATIENT)
Dept: OTOLARYNGOLOGY | Facility: CLINIC | Age: 63
End: 2023-04-24
Payer: COMMERCIAL

## 2023-04-24 VITALS — HEIGHT: 74 IN | WEIGHT: 205 LBS | BODY MASS INDEX: 26.31 KG/M2

## 2023-04-24 DIAGNOSIS — J32.0 CHRONIC MAXILLARY SINUSITIS: ICD-10-CM

## 2023-04-24 DIAGNOSIS — D14.0 INVERTED PAPILLOMA OF LATERAL NASAL WALL: Primary | ICD-10-CM

## 2023-04-24 DIAGNOSIS — J34.89 NASAL CRUSTING: ICD-10-CM

## 2023-04-24 PROCEDURE — 1159F PR MEDICATION LIST DOCUMENTED IN MEDICAL RECORD: ICD-10-PCS | Mod: CPTII,S$GLB,, | Performed by: OTOLARYNGOLOGY

## 2023-04-24 PROCEDURE — 3044F HG A1C LEVEL LT 7.0%: CPT | Mod: CPTII,S$GLB,, | Performed by: OTOLARYNGOLOGY

## 2023-04-24 PROCEDURE — 99999 PR PBB SHADOW E&M-EST. PATIENT-LVL IV: CPT | Mod: PBBFAC,,, | Performed by: OTOLARYNGOLOGY

## 2023-04-24 PROCEDURE — 1159F MED LIST DOCD IN RCRD: CPT | Mod: CPTII,S$GLB,, | Performed by: OTOLARYNGOLOGY

## 2023-04-24 PROCEDURE — 99499 UNLISTED E&M SERVICE: CPT | Mod: S$GLB,,, | Performed by: OTOLARYNGOLOGY

## 2023-04-24 PROCEDURE — 31237 PR NASAL/SINUS ENDOSCOPY,BX/RMV POLYP/DEBRID: ICD-10-PCS | Mod: 79,LT,S$GLB, | Performed by: OTOLARYNGOLOGY

## 2023-04-24 PROCEDURE — 3044F PR MOST RECENT HEMOGLOBIN A1C LEVEL <7.0%: ICD-10-PCS | Mod: CPTII,S$GLB,, | Performed by: OTOLARYNGOLOGY

## 2023-04-24 PROCEDURE — 3008F BODY MASS INDEX DOCD: CPT | Mod: CPTII,S$GLB,, | Performed by: OTOLARYNGOLOGY

## 2023-04-24 PROCEDURE — 99999 PR PBB SHADOW E&M-EST. PATIENT-LVL IV: ICD-10-PCS | Mod: PBBFAC,,, | Performed by: OTOLARYNGOLOGY

## 2023-04-24 PROCEDURE — 31237 NSL/SINS NDSC SURG BX POLYPC: CPT | Mod: 79,LT,S$GLB, | Performed by: OTOLARYNGOLOGY

## 2023-04-24 PROCEDURE — 99499 NO LOS: ICD-10-PCS | Mod: S$GLB,,, | Performed by: OTOLARYNGOLOGY

## 2023-04-24 PROCEDURE — 1160F RVW MEDS BY RX/DR IN RCRD: CPT | Mod: CPTII,S$GLB,, | Performed by: OTOLARYNGOLOGY

## 2023-04-24 PROCEDURE — 1160F PR REVIEW ALL MEDS BY PRESCRIBER/CLIN PHARMACIST DOCUMENTED: ICD-10-PCS | Mod: CPTII,S$GLB,, | Performed by: OTOLARYNGOLOGY

## 2023-04-24 PROCEDURE — 3008F PR BODY MASS INDEX (BMI) DOCUMENTED: ICD-10-PCS | Mod: CPTII,S$GLB,, | Performed by: OTOLARYNGOLOGY

## 2023-04-24 NOTE — PROGRESS NOTES
Here for post-operative visit #2   S/p   Procedures performed  Septoplasty  Right Revision Maxillary Antrostomy with removal of tissue    Operative findings:  Moderate right deviation limiting access to middle meatus, septoplasty performed. Residual uncinate with complete obstruction / synechiae of natural maxillary osLarge antrostomy created.  Implants: Novapak      Final pathology revealed incidental inverted papilloma  He continues to have anxiety about IP - reading a lot about it and worried about needing aggressive treatment. Continues to feel mild pressure along anterior maxillary wall and subjective swelling of soft tissue of cheek.     Nasal Endoscopy with Debridement    Nasal endoscopy with debridement was performed in accordance with the 0 day global period for endoscopic sinus surgery and are unrelated to any other recently performed procedures.     Topical Agents: Topical 0.25% phenylephrine and 4% lidocaine    A 30 degree rigid nasal endoscope was inserted into the nose to visualize the nasal passageway and paranasal sinuses. Under endoscopic visualization, a combination of instruments including suction and grasping forceps were used to debride crust, debris, inflammatory tissue and nasal polyps from the nasal cavity, paranasal sinuses and sinus drainage pathways. This was performed to aid in healing and optimize the patency and function of the sinus cavities and nasal passageways. This is necessary to avoid scar formation, infection, and mucocele formation. In addition, this facilitates in the optimal instillation of topical therapies, saline irrigations, long-term disease surveillance, and endoscopically-derived cultures. Examination of the inferior turbinates, middle turbinates, middle meatus, operated sinuses, sphenoethmoidal recess, and nasopharynx was undertaken. All findings were normal for this stage in healing and included:    LEFT: N/A  RIGHT: Crusting at maxillary os and middle meatus  removed. Patent maxillary with healing mucosa along medial wall and orbital buttress. Maxillary sinus mucosa normal    Today's visit:                    Pre-op:          Middle meatus, residual uncinate        Middle meatus,  residual uncinate, posterior fontanelle      Assessment & Plan:  - Healing as expected  - Discussed pathology - will need surveillance. He has a lot of anxiety of IP. I reassured that this location is not worrisome as difficult to treat.  - he would like to establish with rhinology for surveillance- will facilitate visit  - RTC 3-4 weeks

## 2023-04-25 ENCOUNTER — TELEPHONE (OUTPATIENT)
Dept: OTOLARYNGOLOGY | Facility: CLINIC | Age: 63
End: 2023-04-25
Payer: COMMERCIAL

## 2023-04-25 NOTE — TELEPHONE ENCOUNTER
----- Message from Neville Navarrete MD sent at 4/25/2023  7:06 AM CDT -----  Please arrange consultation with Dr. Ricci. Not urgent but next 6-8 weeks if possible.

## 2023-04-26 ENCOUNTER — PATIENT MESSAGE (OUTPATIENT)
Dept: FAMILY MEDICINE | Facility: CLINIC | Age: 63
End: 2023-04-26
Payer: COMMERCIAL

## 2023-04-26 ENCOUNTER — PATIENT MESSAGE (OUTPATIENT)
Dept: OTOLARYNGOLOGY | Facility: CLINIC | Age: 63
End: 2023-04-26
Payer: COMMERCIAL

## 2023-05-01 ENCOUNTER — OFFICE VISIT (OUTPATIENT)
Dept: OTOLARYNGOLOGY | Facility: CLINIC | Age: 63
End: 2023-05-01
Payer: COMMERCIAL

## 2023-05-01 VITALS — WEIGHT: 205 LBS | HEIGHT: 74 IN | BODY MASS INDEX: 26.31 KG/M2

## 2023-05-01 DIAGNOSIS — J32.0 CHRONIC MAXILLARY SINUSITIS: Primary | ICD-10-CM

## 2023-05-01 DIAGNOSIS — D14.0 INVERTED PAPILLOMA OF LATERAL NASAL WALL: ICD-10-CM

## 2023-05-01 PROCEDURE — 99999 PR PBB SHADOW E&M-EST. PATIENT-LVL III: ICD-10-PCS | Mod: PBBFAC,,, | Performed by: OTOLARYNGOLOGY

## 2023-05-01 PROCEDURE — 3044F HG A1C LEVEL LT 7.0%: CPT | Mod: CPTII,S$GLB,, | Performed by: OTOLARYNGOLOGY

## 2023-05-01 PROCEDURE — 1159F MED LIST DOCD IN RCRD: CPT | Mod: CPTII,S$GLB,, | Performed by: OTOLARYNGOLOGY

## 2023-05-01 PROCEDURE — 99999 PR PBB SHADOW E&M-EST. PATIENT-LVL III: CPT | Mod: PBBFAC,,, | Performed by: OTOLARYNGOLOGY

## 2023-05-01 PROCEDURE — 31237 PR NASAL/SINUS ENDOSCOPY,BX/RMV POLYP/DEBRID: ICD-10-PCS | Mod: 79,RT,S$GLB, | Performed by: OTOLARYNGOLOGY

## 2023-05-01 PROCEDURE — 3008F PR BODY MASS INDEX (BMI) DOCUMENTED: ICD-10-PCS | Mod: CPTII,S$GLB,, | Performed by: OTOLARYNGOLOGY

## 2023-05-01 PROCEDURE — 3044F PR MOST RECENT HEMOGLOBIN A1C LEVEL <7.0%: ICD-10-PCS | Mod: CPTII,S$GLB,, | Performed by: OTOLARYNGOLOGY

## 2023-05-01 PROCEDURE — 1159F PR MEDICATION LIST DOCUMENTED IN MEDICAL RECORD: ICD-10-PCS | Mod: CPTII,S$GLB,, | Performed by: OTOLARYNGOLOGY

## 2023-05-01 PROCEDURE — 99499 NO LOS: ICD-10-PCS | Mod: S$GLB,,, | Performed by: OTOLARYNGOLOGY

## 2023-05-01 PROCEDURE — 31237 NSL/SINS NDSC SURG BX POLYPC: CPT | Mod: 79,RT,S$GLB, | Performed by: OTOLARYNGOLOGY

## 2023-05-01 PROCEDURE — 1160F RVW MEDS BY RX/DR IN RCRD: CPT | Mod: CPTII,S$GLB,, | Performed by: OTOLARYNGOLOGY

## 2023-05-01 PROCEDURE — 1160F PR REVIEW ALL MEDS BY PRESCRIBER/CLIN PHARMACIST DOCUMENTED: ICD-10-PCS | Mod: CPTII,S$GLB,, | Performed by: OTOLARYNGOLOGY

## 2023-05-01 PROCEDURE — 99499 UNLISTED E&M SERVICE: CPT | Mod: S$GLB,,, | Performed by: OTOLARYNGOLOGY

## 2023-05-01 PROCEDURE — 3008F BODY MASS INDEX DOCD: CPT | Mod: CPTII,S$GLB,, | Performed by: OTOLARYNGOLOGY

## 2023-05-01 NOTE — TELEPHONE ENCOUNTER
Tell patient that the lab data fits.  Initially TSH was elevated at 4.28 normal being less than 3.0 and follow-up TSH on 03/24 was 3.47.  Free T4 which is thyroid in the blood to biologically active T4 was 0.84 or low normal..  Thyroid antibodies were also positive.  He can hold levothyroxine for now.  En choose to resume it would get brand-name Synthroid just in case it is to levothyroxine but probably not.  While a week thyroid can cause fatigue your thyroid levels are not significantly abnormal.  I would touch based with your allergist as you on a lot of antihistamine which can cause fatigue and I will also limit Norco use as opiates can cause fatigue as well.  Zyrtec Xyzal and even Claritin can be sedating I would also cut the trazodone in half and take half of a 50 mg at night only as needed for sleep rather than around the clock as this may help as well.  Please schedule a virtual clinic visit sometime in the next 2-3 weeks for reassessment.  If fatigue worsens head to the urgent care center or ER for further evaluation.   Statement Selected

## 2023-05-02 NOTE — PROGRESS NOTES
Here for post-operative visit #3   S/p   Procedures performed  Septoplasty  Right Revision Maxillary Antrostomy with removal of tissue    Operative findings:  Moderate right deviation limiting access to middle meatus, septoplasty performed. Residual uncinate with complete obstruction / synechiae of natural maxillary osLarge antrostomy created.  Implants: Novapak      Final pathology revealed incidental inverted papilloma  He continues to have anxiety about IP - reading a lot about it and worried about needing aggressive treatment. Felt increase in thick mucous and worried about beginning on infection. Lasted a day but now better for past day or two.     Nasal Endoscopy with Debridement    Nasal endoscopy with debridement was performed in accordance with the 0 day global period for endoscopic sinus surgery and are unrelated to any other recently performed procedures.     Topical Agents: Topical 0.25% phenylephrine and 4% lidocaine    A 30 degree rigid nasal endoscope was inserted into the nose to visualize the nasal passageway and paranasal sinuses. Under endoscopic visualization, a combination of instruments including suction and grasping forceps were used to debride crust, debris, inflammatory tissue and nasal polyps from the nasal cavity, paranasal sinuses and sinus drainage pathways. This was performed to aid in healing and optimize the patency and function of the sinus cavities and nasal passageways. This is necessary to avoid scar formation, infection, and mucocele formation. In addition, this facilitates in the optimal instillation of topical therapies, saline irrigations, long-term disease surveillance, and endoscopically-derived cultures. Examination of the inferior turbinates, middle turbinates, middle meatus, operated sinuses, sphenoethmoidal recess, and nasopharynx was undertaken. All findings were normal for this stage in healing and included:    LEFT: N/A  RIGHT: mild mucous at maxillary os and middle  meatus removed. Patent maxillary with healing mucosa along medial wall and orbital buttress. Maxillary sinus mucosa normal    Assessment & Plan:  - No infection  - continue irrigations  - FU as previously scheduled

## 2023-05-08 DIAGNOSIS — E03.9 HYPOTHYROIDISM, UNSPECIFIED TYPE: ICD-10-CM

## 2023-05-08 NOTE — TELEPHONE ENCOUNTER
No care due was identified.  [facility] Embedded Care Due Messages. Reference number: [id number].   5/08/2023 3:26:23 AM CDT

## 2023-05-09 RX ORDER — LEVOTHYROXINE SODIUM 25 UG/1
TABLET ORAL
Qty: 90 TABLET | Refills: 2 | Status: SHIPPED | OUTPATIENT
Start: 2023-05-09 | End: 2023-06-05

## 2023-05-09 NOTE — TELEPHONE ENCOUNTER
Refill Routing Note   Medication(s) are not appropriate for processing by [facility] for the following reason(s):      New or recently adjusted medication    [facility] action(s):  Defer None identified          Appointments  past 12m or future 3m with PCP    Date Provider   Last Visit   3/10/2023 Chava Mullins MD   Next Visit   Visit date not found Chava Mullins MD   ED visits in past 90 days: 0        Note composed:8:49 PM 05/08/2023

## 2023-05-22 ENCOUNTER — OFFICE VISIT (OUTPATIENT)
Dept: OTOLARYNGOLOGY | Facility: CLINIC | Age: 63
End: 2023-05-22
Payer: COMMERCIAL

## 2023-05-22 ENCOUNTER — LAB VISIT (OUTPATIENT)
Dept: LAB | Facility: HOSPITAL | Age: 63
End: 2023-05-22
Payer: COMMERCIAL

## 2023-05-22 VITALS — BODY MASS INDEX: 26.32 KG/M2 | WEIGHT: 205 LBS

## 2023-05-22 DIAGNOSIS — D14.0 INVERTED PAPILLOMA OF LATERAL NASAL WALL: ICD-10-CM

## 2023-05-22 DIAGNOSIS — J32.0 CHRONIC MAXILLARY SINUSITIS: Primary | ICD-10-CM

## 2023-05-22 DIAGNOSIS — J34.89 NASAL CRUSTING: ICD-10-CM

## 2023-05-22 DIAGNOSIS — D80.9 SELECTIVE IMMUNOGLOBULIN DEFICIENCY: Primary | ICD-10-CM

## 2023-05-22 PROCEDURE — 36415 COLL VENOUS BLD VENIPUNCTURE: CPT | Mod: PO | Performed by: SPECIALIST

## 2023-05-22 PROCEDURE — 99499 UNLISTED E&M SERVICE: CPT | Mod: S$GLB,,, | Performed by: OTOLARYNGOLOGY

## 2023-05-22 PROCEDURE — 99499 NO LOS: ICD-10-PCS | Mod: S$GLB,,, | Performed by: OTOLARYNGOLOGY

## 2023-05-22 PROCEDURE — 86317 IMMUNOASSAY INFECTIOUS AGENT: CPT | Performed by: SPECIALIST

## 2023-05-22 PROCEDURE — 99999 PR PBB SHADOW E&M-EST. PATIENT-LVL III: CPT | Mod: PBBFAC,,, | Performed by: OTOLARYNGOLOGY

## 2023-05-22 PROCEDURE — 99999 PR PBB SHADOW E&M-EST. PATIENT-LVL III: ICD-10-PCS | Mod: PBBFAC,,, | Performed by: OTOLARYNGOLOGY

## 2023-05-22 NOTE — PROGRESS NOTES
Here for post-operative visit #4   S/p   Procedures performed  Septoplasty  Right Revision Maxillary Antrostomy with removal of tissue    Operative findings:  Moderate right deviation limiting access to middle meatus, septoplasty performed. Residual uncinate with complete obstruction / synechiae of natural maxillary osLarge antrostomy created.  Implants: Novapak      Doing well overall.   Crusting and drainage much less. Feels sinus overall much better.     Nasal Endoscopy    Nasal endoscopy with debridement was performed in accordance with the 0 day global period for endoscopic sinus surgery and are unrelated to any other recently performed procedures.     Topical Agents: Topical 0.25% phenylephrine and 4% lidocaine    A 30 degree rigid nasal endoscope was inserted into the nose to visualize the nasal passageway and paranasal sinuses. Under endoscopic visualization, a combination of instruments including suction and grasping forceps were used to debride crust, debris, inflammatory tissue and nasal polyps from the nasal cavity, paranasal sinuses and sinus drainage pathways. This was performed to aid in healing and optimize the patency and function of the sinus cavities and nasal passageways. This is necessary to avoid scar formation, infection, and mucocele formation. In addition, this facilitates in the optimal instillation of topical therapies, saline irrigations, long-term disease surveillance, and endoscopically-derived cultures. Examination of the inferior turbinates, middle turbinates, middle meatus, operated sinuses, sphenoethmoidal recess, and nasopharynx was undertaken. All findings were normal for this stage in healing and included:    LEFT: N/A  RIGHT: mild mucous at maxillary os and middle meatus removed. Patent maxillary. Slightly granular mucosa along medial maxillary wall at junction with inferior turbinate and along orbital junction of inferior orbital wall and lamina. Maxillary sinus mucosa  normal                Pathology:  Inverted papilloma      Assessment & Plan:  - Has appt next month with Dr. Ricci for examination and surveillance.  - Awaiting results of allergy testing - ok to stop allergy meds if neg.  - RTC prn

## 2023-05-30 ENCOUNTER — PATIENT MESSAGE (OUTPATIENT)
Dept: FAMILY MEDICINE | Facility: CLINIC | Age: 63
End: 2023-05-30
Payer: COMMERCIAL

## 2023-05-31 LAB
DEPRECATED S PNEUM12 IGG SER-MCNC: 3.3 UG/ML
DEPRECATED S PNEUM23 IGG SER-MCNC: 17.6 UG/ML
DEPRECATED S PNEUM4 IGG SER-MCNC: 0.8 UG/ML
DEPRECATED S PNEUM8 IGG SER-MCNC: 4.4 UG/ML
DEPRECATED S PNEUM9 IGG SER-MCNC: 2.9 UG/ML
S PN DA SERO 19F IGG SER-MCNC: 18.6 UG/ML
S PNEUM DA 1 IGG SER-MCNC: 1.7 UG/ML
S PNEUM DA 14 IGG SER-MCNC: 35.1
S PNEUM DA 18C IGG SER-MCNC: 1.7
S PNEUM DA 3 IGG SER-MCNC: 0.9 UG/ML
S PNEUM DA 5 IGG SER-MCNC: 126.5 UG/ML
S PNEUM DA 6B IGG SER-MCNC: 0.3 UG/ML
S PNEUM DA 7F IGG SER-MCNC: 27.4 UG/ML
S PNEUM DA 9V IGG SER-MCNC: 0.9 UG/ML

## 2023-06-05 ENCOUNTER — OFFICE VISIT (OUTPATIENT)
Dept: FAMILY MEDICINE | Facility: CLINIC | Age: 63
End: 2023-06-05
Payer: COMMERCIAL

## 2023-06-05 VITALS
DIASTOLIC BLOOD PRESSURE: 68 MMHG | HEART RATE: 74 BPM | SYSTOLIC BLOOD PRESSURE: 108 MMHG | BODY MASS INDEX: 26.83 KG/M2 | OXYGEN SATURATION: 97 % | WEIGHT: 209 LBS

## 2023-06-05 DIAGNOSIS — R79.89 ELEVATED TSH: ICD-10-CM

## 2023-06-05 DIAGNOSIS — E78.2 MIXED HYPERLIPIDEMIA: Primary | Chronic | ICD-10-CM

## 2023-06-05 DIAGNOSIS — R35.0 URINARY FREQUENCY: ICD-10-CM

## 2023-06-05 PROCEDURE — 3078F DIAST BP <80 MM HG: CPT | Mod: CPTII,S$GLB,, | Performed by: FAMILY MEDICINE

## 2023-06-05 PROCEDURE — 3008F PR BODY MASS INDEX (BMI) DOCUMENTED: ICD-10-PCS | Mod: CPTII,S$GLB,, | Performed by: FAMILY MEDICINE

## 2023-06-05 PROCEDURE — 99214 OFFICE O/P EST MOD 30 MIN: CPT | Mod: S$GLB,,, | Performed by: FAMILY MEDICINE

## 2023-06-05 PROCEDURE — 3078F PR MOST RECENT DIASTOLIC BLOOD PRESSURE < 80 MM HG: ICD-10-PCS | Mod: CPTII,S$GLB,, | Performed by: FAMILY MEDICINE

## 2023-06-05 PROCEDURE — 3008F BODY MASS INDEX DOCD: CPT | Mod: CPTII,S$GLB,, | Performed by: FAMILY MEDICINE

## 2023-06-05 PROCEDURE — 99214 PR OFFICE/OUTPT VISIT, EST, LEVL IV, 30-39 MIN: ICD-10-PCS | Mod: S$GLB,,, | Performed by: FAMILY MEDICINE

## 2023-06-05 PROCEDURE — 3074F SYST BP LT 130 MM HG: CPT | Mod: CPTII,S$GLB,, | Performed by: FAMILY MEDICINE

## 2023-06-05 PROCEDURE — 3074F PR MOST RECENT SYSTOLIC BLOOD PRESSURE < 130 MM HG: ICD-10-PCS | Mod: CPTII,S$GLB,, | Performed by: FAMILY MEDICINE

## 2023-06-05 PROCEDURE — 1159F PR MEDICATION LIST DOCUMENTED IN MEDICAL RECORD: ICD-10-PCS | Mod: CPTII,S$GLB,, | Performed by: FAMILY MEDICINE

## 2023-06-05 PROCEDURE — 99999 PR PBB SHADOW E&M-EST. PATIENT-LVL III: CPT | Mod: PBBFAC,,, | Performed by: FAMILY MEDICINE

## 2023-06-05 PROCEDURE — 3044F PR MOST RECENT HEMOGLOBIN A1C LEVEL <7.0%: ICD-10-PCS | Mod: CPTII,S$GLB,, | Performed by: FAMILY MEDICINE

## 2023-06-05 PROCEDURE — 1159F MED LIST DOCD IN RCRD: CPT | Mod: CPTII,S$GLB,, | Performed by: FAMILY MEDICINE

## 2023-06-05 PROCEDURE — 1160F RVW MEDS BY RX/DR IN RCRD: CPT | Mod: CPTII,S$GLB,, | Performed by: FAMILY MEDICINE

## 2023-06-05 PROCEDURE — 1160F PR REVIEW ALL MEDS BY PRESCRIBER/CLIN PHARMACIST DOCUMENTED: ICD-10-PCS | Mod: CPTII,S$GLB,, | Performed by: FAMILY MEDICINE

## 2023-06-05 PROCEDURE — 3044F HG A1C LEVEL LT 7.0%: CPT | Mod: CPTII,S$GLB,, | Performed by: FAMILY MEDICINE

## 2023-06-05 PROCEDURE — 99999 PR PBB SHADOW E&M-EST. PATIENT-LVL III: ICD-10-PCS | Mod: PBBFAC,,, | Performed by: FAMILY MEDICINE

## 2023-06-05 NOTE — PROGRESS NOTES
Subjective:       Patient ID: Osbaldo Brewer is a 62 y.o. male.    Chief Complaint: Establish Care    Here today to establish care with a new PCP.   He was seeing Dr. Miriam Lazo and then Dr Chava Mullins in [place].    Immunizations: Pneumovax 2023  Last Lab Work: 2023  Colon Ca screening: Colonoscopy due 2025  Prostate Ca Screening:     HLD: He is on Lipitor 40 mg.  Cholesterol was in range 3/2023.  He saw cardiologist here.    Chronic Sinusitis: Seeing ENT.  S/p sinus surgery 3/2023.  Culture positive for Pseudomonas.  He recently saw Immunologist (Quresh)  Fatigue:  had lab work done, very slightly elevated TSH.  Took Synthroid 25 mcg for about a month.  He stopped it about 4 weeks ago.  Of note, he had COVID in 2020 and required inhalers, but also got antibiotics.      Review of Systems   Constitutional:  Negative for appetite change.   Respiratory:  Negative for cough, shortness of breath and wheezing.    Cardiovascular:  Negative for chest pain and palpitations.   Gastrointestinal:  Negative for abdominal pain, constipation, diarrhea, nausea and vomiting.   Genitourinary:  Positive for frequency (nocturia - was prescibed Cialis to use). Negative for difficulty urinating, dysuria and hematuria.   Neurological:  Negative for dizziness, syncope, weakness and headaches.   Psychiatric/Behavioral:  Negative for agitation, behavioral problems and confusion.      Objective:      Vitals:    06/05/23 1403   BP: 108/68   BP Location: Right arm   Patient Position: Sitting   Pulse: 74   SpO2: 97%   Weight: 94.8 kg (208 lb 15.9 oz)      Physical Exam  Constitutional:       General: He is not in acute distress.  Cardiovascular:      Rate and Rhythm: Normal rate and regular rhythm.      Heart sounds: Normal heart sounds. No murmur heard.  Pulmonary:      Effort: Pulmonary effort is normal. No respiratory distress.      Breath sounds: Normal breath sounds. No wheezing, rhonchi or rales.   Skin:      General: Skin is warm and dry.   Neurological:      General: No focal deficit present.      Mental Status: He is alert.   Psychiatric:         Mood and Affect: Mood normal.         Behavior: Behavior normal.         Thought Content: Thought content normal.          Assessment:       1. Mixed hyperlipidemia    2. Elevated TSH    3. Urinary frequency        Plan:       Mixed hyperlipidemia  Well controlled.  Continue Lipitor.  Elevated TSH  -     TSH; Future; Expected date: 06/19/2023  Overall, his fatigue is improving.  Stay off medication at this time.  Recheck TSH in about 2 weeks.  Will decide if medication is needed based on his TSH result.  Urinary frequency  Using Cialis PRN as it causes heartburn.  We discussed Flomax as an option and he will think about it.    He will f/u with immunologist.        Medication List with Changes/Refills   Current Medications    ATORVASTATIN (LIPITOR) 40 MG TABLET    Take 40 mg by mouth once daily.    COQ10, UBIQUINOL, ORAL    Take by mouth.    TADALAFIL (CIALIS) 5 MG TABLET    Take 1 tablet (5 mg total) by mouth daily as needed for Erectile Dysfunction.   Discontinued Medications    AZELASTINE (ASTELIN) 137 MCG (0.1 %) NASAL SPRAY    1 spray by Nasal route 2 (two) times daily.    BUDESONIDE 0.6 MG/GENTAMICIN 50 ML NORMAL SALINE NASAL IRRIGATION    ADD 1ML (0.5ML TWICE) OF MEDICATION TO 250ML OF SALINE IN SALINE IRRIGATION BOTTLE; IRRIGATE SINUSES WITH 120ML THROUGH EACH NOSTRIL TWICE DAILY    BUSPIRONE (BUSPAR) 5 MG TAB    Take 5-7.5 mg po TID as needed for anxiety or sleep. Generic    CETIRIZINE (ZYRTEC) 10 MG TABLET    Take 10 mg by mouth twice a week.    DIAZEPAM (VALIUM) 5 MG TABLET    Take 1 tablet (5 mg total) by mouth 1 (one) time if needed for Anxiety.    FLUTICASONE PROPIONATE (FLONASE) 50 MCG/ACTUATION NASAL SPRAY    1 spray by Each Nostril route once daily.    HYDROCODONE-ACETAMINOPHEN (NORCO) 5-325 MG PER TABLET    Take 1 tablet by mouth every 4 (four) hours as  needed for Pain.    LEVOCETIRIZINE (XYZAL) 5 MG TABLET    Take 5 mg by mouth 3 (three) times a week.    LEVOTHYROXINE (SYNTHROID) 25 MCG TABLET    TAKE 1 TABLET(25 MCG) BY MOUTH EVERY MORNING BEFORE BREAKFAST    LORATADINE (CLARITIN) 10 MG TABLET    Take 10 mg by mouth twice a week.    ONDANSETRON (ZOFRAN-ODT) 4 MG TBDL    Take 1 tablet (4 mg total) by mouth every 6 (six) hours as needed (Nausea).    TRAZODONE (DESYREL) 50 MG TABLET    Take 1 tablet (50 mg total) by mouth every evening. Generic

## 2023-06-20 ENCOUNTER — OFFICE VISIT (OUTPATIENT)
Dept: OTOLARYNGOLOGY | Facility: CLINIC | Age: 63
End: 2023-06-20
Payer: COMMERCIAL

## 2023-06-20 VITALS
BODY MASS INDEX: 27.19 KG/M2 | DIASTOLIC BLOOD PRESSURE: 78 MMHG | HEIGHT: 74 IN | SYSTOLIC BLOOD PRESSURE: 117 MMHG | HEART RATE: 60 BPM | WEIGHT: 211.88 LBS

## 2023-06-20 DIAGNOSIS — D14.0 INVERTED PAPILLOMA OF LATERAL NASAL WALL: ICD-10-CM

## 2023-06-20 PROCEDURE — 3078F DIAST BP <80 MM HG: CPT | Mod: CPTII,S$GLB,, | Performed by: STUDENT IN AN ORGANIZED HEALTH CARE EDUCATION/TRAINING PROGRAM

## 2023-06-20 PROCEDURE — 1159F MED LIST DOCD IN RCRD: CPT | Mod: CPTII,S$GLB,, | Performed by: STUDENT IN AN ORGANIZED HEALTH CARE EDUCATION/TRAINING PROGRAM

## 2023-06-20 PROCEDURE — 1160F PR REVIEW ALL MEDS BY PRESCRIBER/CLIN PHARMACIST DOCUMENTED: ICD-10-PCS | Mod: CPTII,S$GLB,, | Performed by: STUDENT IN AN ORGANIZED HEALTH CARE EDUCATION/TRAINING PROGRAM

## 2023-06-20 PROCEDURE — 99213 OFFICE O/P EST LOW 20 MIN: CPT | Mod: 25,S$GLB,, | Performed by: STUDENT IN AN ORGANIZED HEALTH CARE EDUCATION/TRAINING PROGRAM

## 2023-06-20 PROCEDURE — 3008F PR BODY MASS INDEX (BMI) DOCUMENTED: ICD-10-PCS | Mod: CPTII,S$GLB,, | Performed by: STUDENT IN AN ORGANIZED HEALTH CARE EDUCATION/TRAINING PROGRAM

## 2023-06-20 PROCEDURE — 3078F PR MOST RECENT DIASTOLIC BLOOD PRESSURE < 80 MM HG: ICD-10-PCS | Mod: CPTII,S$GLB,, | Performed by: STUDENT IN AN ORGANIZED HEALTH CARE EDUCATION/TRAINING PROGRAM

## 2023-06-20 PROCEDURE — 99999 PR PBB SHADOW E&M-EST. PATIENT-LVL III: ICD-10-PCS | Mod: PBBFAC,,, | Performed by: STUDENT IN AN ORGANIZED HEALTH CARE EDUCATION/TRAINING PROGRAM

## 2023-06-20 PROCEDURE — 3074F SYST BP LT 130 MM HG: CPT | Mod: CPTII,S$GLB,, | Performed by: STUDENT IN AN ORGANIZED HEALTH CARE EDUCATION/TRAINING PROGRAM

## 2023-06-20 PROCEDURE — 3044F PR MOST RECENT HEMOGLOBIN A1C LEVEL <7.0%: ICD-10-PCS | Mod: CPTII,S$GLB,, | Performed by: STUDENT IN AN ORGANIZED HEALTH CARE EDUCATION/TRAINING PROGRAM

## 2023-06-20 PROCEDURE — 3008F BODY MASS INDEX DOCD: CPT | Mod: CPTII,S$GLB,, | Performed by: STUDENT IN AN ORGANIZED HEALTH CARE EDUCATION/TRAINING PROGRAM

## 2023-06-20 PROCEDURE — 99999 PR PBB SHADOW E&M-EST. PATIENT-LVL III: CPT | Mod: PBBFAC,,, | Performed by: STUDENT IN AN ORGANIZED HEALTH CARE EDUCATION/TRAINING PROGRAM

## 2023-06-20 PROCEDURE — 99213 PR OFFICE/OUTPT VISIT, EST, LEVL III, 20-29 MIN: ICD-10-PCS | Mod: 25,S$GLB,, | Performed by: STUDENT IN AN ORGANIZED HEALTH CARE EDUCATION/TRAINING PROGRAM

## 2023-06-20 PROCEDURE — 3044F HG A1C LEVEL LT 7.0%: CPT | Mod: CPTII,S$GLB,, | Performed by: STUDENT IN AN ORGANIZED HEALTH CARE EDUCATION/TRAINING PROGRAM

## 2023-06-20 PROCEDURE — 1159F PR MEDICATION LIST DOCUMENTED IN MEDICAL RECORD: ICD-10-PCS | Mod: CPTII,S$GLB,, | Performed by: STUDENT IN AN ORGANIZED HEALTH CARE EDUCATION/TRAINING PROGRAM

## 2023-06-20 PROCEDURE — 1160F RVW MEDS BY RX/DR IN RCRD: CPT | Mod: CPTII,S$GLB,, | Performed by: STUDENT IN AN ORGANIZED HEALTH CARE EDUCATION/TRAINING PROGRAM

## 2023-06-20 PROCEDURE — 3074F PR MOST RECENT SYSTOLIC BLOOD PRESSURE < 130 MM HG: ICD-10-PCS | Mod: CPTII,S$GLB,, | Performed by: STUDENT IN AN ORGANIZED HEALTH CARE EDUCATION/TRAINING PROGRAM

## 2023-06-20 NOTE — PROGRESS NOTES
Otolaryngology - Head and Neck Surgery New Patient Visit    6/20/2023    Referring Provider: Neville Navarrete MD    No chief complaint on file.    History of Present Illness, Otolaryngology Specialty-Specific Exam, and Assessment and Plan:     Osbaldo Brewer is a 62 y.o. 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 He complains of recurrent sinus pressure and recurrent sinus infections that possibly started after dental procedure. He denies nasal obstruction and epistaxis. He has been treated with previous right ESS in the past and topical; antibiotics. He has never had allergy testing. He denies previous skullbase surgery. He denies snoring.    He had a CT of the sinuses done on 3/16/23 which I reviewed along with the associated radiology report.    On exam today, the ears are normal. The oral cavity is clear. The neck is clear. The nasopharynx, hypopharynx, and larynx are normal. Nasal endoscopy reveals post surgical changes to the sinuses. Patient right ethmoid and maxillary sinuses. Appropriate healing of the middle meatus. No obvious tumor seen in sinuses.     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 I had a long discussion with him regarding further management.     There does not appear to be any gross tumor residing in the middle meatus. I disused that typical removal of IPs include finding the attachment point. It is unclear if this was done during his routine sinus case, but it may be possible.     I recommended serial endoscopy to rule out early recurrence or residual growth of microscopic disease.      Thank you for allowing us to participate in the care of your patient. We will continue to keep you informed of his progress. He will follow up with me in 6 months time. I discussed that any abnormal epistaxis or nasal obstruction, he should be evaluated sooner  "in clinic.     Sincerely yours,    Cory Ricci MD      Objective     Physical Examination  Vitals -  height is 6' 2" (1.88 m) and weight is 96.1 kg (211 lb 13.8 oz). His blood pressure is 117/78 and his pulse is 60.   Constitutional - General appearance: Normal. Ability to communicate: Normal.  Head & Face - Overall appearance, scars, masses: Normal. Palpation &/or percussion of face: Normal. Salivary glands: Normal. Facial strength: Normal  ENMT - Otoscopic exam: Normal. Assessment of hearing: Normal. External inspection: Normal. Nasal mucosa, septum, turbinates: Abnormal see exam details. Lips, teeth, gums: Normal. Oropharynx: Normal. Pharyngeal walls/pyriform sinus: Normal. Larynx: Normal. Nasopharynx: Normal  Neck - Neck: Normal. Thyroid: Normal  Lymphatic - Palpation of lymph nodes: Normal  Eyes - Ocular mobility: Normal  Respiratory - Inspection of Chest: Normal  Cardiovascular - Peripheral vascular system: Normal  Neurological/Psychiatric - Orientation: Normal    Review of Systems  A complete review of systems was obtained 06/21/2023 and reviewed.  The review of systems is negative for symptoms except as described above.    /78 (BP Location: Left arm, Patient Position: Sitting, BP Method: Large (Automatic))   Pulse 60   Ht 6' 2" (1.88 m)   Wt 96.1 kg (211 lb 13.8 oz)   BMI 27.20 kg/m²      Nasal Endoscopy:  6/20/2023    The use of diagnostic nasal endoscopy was considered medically necessary for the evaluation and visualization of the nasal anatomy for symptoms suggestive of nasal or sinus origin. Physical examination (including a nasal speculum evaluation) did not provide sufficient clinical information to establish a diagnosis, or symptoms did not improve or worsened following treatment.     The nasal cavity was decongested with topical 1% phenylephrine and anesthetized with 4% lidocaine.  A rigid 0-degree endoscope was introduced into the nasal cavity.    The patient was seated in the " examination chair. After discussion of risks and benefits, a nasal endoscope was inserted into the nose the endoscope was passed along the left nasal floor to the nasopharynx. It was then passed between the middle and superior meatus, nasal turbinates, nasal septum, nasopharynx and sphenoethmoid region. The nasal endoscope was withdrawn and there was no complications. An identical procedure was performed on the right side. I was present for the entire procedure.The patient tolerated the above procedure well. The findings of this procedure can be found in the dictated note from 6/20/2023 visit.                                            Data Reviewed    WBC (K/uL)   Date Value   03/03/2023 7.53     Eosinophil % (%)   Date Value   03/03/2023 1.9     Eos # (K/uL)   Date Value   03/03/2023 0.1     Platelets (K/uL)   Date Value   03/03/2023 236     Glucose (mg/dL)   Date Value   03/03/2023 88     IgE (IU/mL)   Date Value   03/03/2023 <35       I independently reviewed the images of the CT sinuses dated 3/16/23. Pertinent findings include right mucosal thickening surrounding the residual uncinate process. Very mild maxillary sinus mucosa thickening. Patchy ethmoid sinus opacification bilaterally.

## 2023-06-22 ENCOUNTER — PATIENT MESSAGE (OUTPATIENT)
Dept: CARDIOLOGY | Facility: CLINIC | Age: 63
End: 2023-06-22
Payer: COMMERCIAL

## 2023-07-06 NOTE — PROGRESS NOTES
Subjective:    Patient ID:  Osbaldo Brewer is a 62 y.o. male who presents for follow-up of No chief complaint on file.      TELEMEDICINE VISIT      Problem List Items Addressed This Visit          Cardiac/Vascular    Mixed hyperlipidemia - Primary (Chronic)     Other Visit Diagnoses       Encounter for cardiac risk counseling                HPI    Patient was last seen on 03/22/2023 at which time he was dealing with multiple ENT issues.  Most recent echocardiogram as below.    Patient presents for telemedicine visit.    On assessment today, the patient states that he feels well. Better with congestion in general.  Had ENT surgery. Inverted papilloma was found and currently undergoing treatment.  Seeing Neurosurgery for this now. Going to watch it.    No chest pain.  No shortness of breath.  No palpitations.       Objective:   There were no vitals filed for this visit.    BP Readings from Last 5 Encounters:   06/20/23 117/78   06/05/23 108/68   04/14/23 133/69   03/31/23 133/69   03/22/23 127/82        Physical Exam  Constitutional:       General: He is not in acute distress.     Appearance: He is well-developed. He is not diaphoretic.   HENT:      Head: Normocephalic and atraumatic.   Eyes:      General: No scleral icterus.     Conjunctiva/sclera: Conjunctivae normal.   Pulmonary:      Effort: No respiratory distress.      Breath sounds: No stridor.   Musculoskeletal:         General: No signs of injury.      Cervical back: Normal range of motion.   Skin:     Coloration: Skin is not jaundiced.   Neurological:      Mental Status: He is alert. Mental status is at baseline.   Psychiatric:         Mood and Affect: Mood normal.         Behavior: Behavior normal.           Current Outpatient Medications   Medication Instructions    atorvastatin (LIPITOR) 40 mg, Oral, Daily    COQ10, UBIQUINOL, ORAL Oral    tadalafiL (CIALIS) 5 mg, Oral, Daily PRN       Lipid Panel:   Lab Results   Component Value Date    CHOL 160  03/03/2023    HDL 53 03/03/2023    LDLCALC 96.0 03/03/2023    TRIG 55 03/03/2023    CHOLHDL 33.1 03/03/2023       The 10-year ASCVD risk score (Eliud CARY, et al., 2019) is: 7.6%    Values used to calculate the score:      Age: 62 years      Sex: Male      Is Non- : No      Diabetic: No      Tobacco smoker: No      Systolic Blood Pressure: 117 mmHg      Is BP treated: No      HDL Cholesterol: 53 mg/dL      Total Cholesterol: 160 mg/dL    All pertinent labs, imaging, and EKGs reviewed.  Patient's most recent EKG tracing was personally interpreted by this provider.    Most Recent EKG Results  Results for orders placed or performed in visit on 03/22/23   IN OFFICE EKG 12-LEAD (to [place])    Collection Time: 03/22/23  8:32 AM    Narrative    Test Reason : E78.2,    Vent. Rate : 077 BPM     Atrial Rate : 077 BPM     P-R Int : 148 ms          QRS Dur : 076 ms      QT Int : 374 ms       P-R-T Axes : 070 040 065 degrees     QTc Int : 423 ms    Normal sinus rhythm  Normal ECG  No previous ECGs available  Confirmed by Ember AVILES, Rafa PEDERSON ([id number]) on 3/23/2023 7:14:47 AM    Referred By: VIJAY   SELF           Confirmed By:Rafa Pa MD       Most Recent Echocardiogram Results  Results for orders placed in visit on 04/14/23    Echo    Interpretation Summary  · The left ventricle is normal in size with normal systolic function.  · The estimated ejection fraction is 60%.  · Normal left ventricular diastolic function.  · Normal right ventricular size with normal right ventricular systolic function.  · Normal central venous pressure (3 mmHg).  · The estimated PA systolic pressure is 19 mmHg.      Most Recent Nuclear Stress Test Results  No results found for this or any previous visit.      Most Recent Cardiac PET Stress Test Results  No results found for this or any previous visit.      Most Recent Cardiovascular Angiogram results  No results found for this or any previous visit.      Other Most  Recent Cardiology Results  No results found for this or any previous visit.      Assessment:       1. Mixed hyperlipidemia    2. Encounter for cardiac risk counseling         Plan:     Symptoms OK today  BP/Pulse unable to be assessed on telemedicine visit  Most recent echocardiogram reviewed personally      Continue atorvastatin 40 mg PO Daily   Continue coenzyme Q10    Continue other cardiac medications  Mediterranean Diet/Cardiovascular Exercise Program    Patient queried and all questions were answered.    F/u in 9-12 months to reassess    The patient location is: Home   The chief complaint leading to consultation is:  Please see problem list above  Visit type: Virtual visit with synchronous audio and video  Total time spent with patient: 15 minutes   Each patient to whom he or she provides medical services by telemedicine is:  (1) informed of the relationship between the physician and patient and the respective role of any other health care provider with respect to management of the patient; and (2) notified that he or she may decline to receive medical services by telemedicine and may withdraw from such care at any time.    Notes: See above       Signed:    Rafa Pa MD  7/7/2023 3:55 PM

## 2023-07-07 ENCOUNTER — OFFICE VISIT (OUTPATIENT)
Dept: CARDIOLOGY | Facility: CLINIC | Age: 63
End: 2023-07-07
Payer: COMMERCIAL

## 2023-07-07 ENCOUNTER — PATIENT MESSAGE (OUTPATIENT)
Dept: CARDIOLOGY | Facility: CLINIC | Age: 63
End: 2023-07-07

## 2023-07-07 DIAGNOSIS — E78.2 MIXED HYPERLIPIDEMIA: Primary | Chronic | ICD-10-CM

## 2023-07-07 DIAGNOSIS — Z71.89 ENCOUNTER FOR CARDIAC RISK COUNSELING: ICD-10-CM

## 2023-07-07 PROCEDURE — 99214 OFFICE O/P EST MOD 30 MIN: CPT | Mod: 95,,, | Performed by: INTERNAL MEDICINE

## 2023-07-07 PROCEDURE — 3044F HG A1C LEVEL LT 7.0%: CPT | Mod: CPTII,95,, | Performed by: INTERNAL MEDICINE

## 2023-07-07 PROCEDURE — 3044F PR MOST RECENT HEMOGLOBIN A1C LEVEL <7.0%: ICD-10-PCS | Mod: CPTII,95,, | Performed by: INTERNAL MEDICINE

## 2023-07-07 PROCEDURE — 1160F RVW MEDS BY RX/DR IN RCRD: CPT | Mod: CPTII,95,, | Performed by: INTERNAL MEDICINE

## 2023-07-07 PROCEDURE — 1159F PR MEDICATION LIST DOCUMENTED IN MEDICAL RECORD: ICD-10-PCS | Mod: CPTII,95,, | Performed by: INTERNAL MEDICINE

## 2023-07-07 PROCEDURE — 1160F PR REVIEW ALL MEDS BY PRESCRIBER/CLIN PHARMACIST DOCUMENTED: ICD-10-PCS | Mod: CPTII,95,, | Performed by: INTERNAL MEDICINE

## 2023-07-07 PROCEDURE — 99214 PR OFFICE/OUTPT VISIT, EST, LEVL IV, 30-39 MIN: ICD-10-PCS | Mod: 95,,, | Performed by: INTERNAL MEDICINE

## 2023-07-07 PROCEDURE — 1159F MED LIST DOCD IN RCRD: CPT | Mod: CPTII,95,, | Performed by: INTERNAL MEDICINE

## 2023-07-10 DIAGNOSIS — E78.2 MIXED HYPERLIPIDEMIA: Primary | Chronic | ICD-10-CM

## 2023-07-10 RX ORDER — ATORVASTATIN CALCIUM 40 MG/1
40 TABLET, FILM COATED ORAL DAILY
Qty: 90 TABLET | Refills: 3 | Status: SHIPPED | OUTPATIENT
Start: 2023-07-10

## 2023-07-10 NOTE — TELEPHONE ENCOUNTER
----- Message from Tiffany Ferris sent at 7/10/2023  9:15 AM CDT -----  Contact: Patient's wife  Type:  Needs Medical Advice    Who Called: Patient's wifeTrang    Would the patient rather a call back or a response via [facility]? Call     Best Call Back Number:  [phone] (home)      Additional Information: Patient's wifetrang would like to speak with the nurse in regards to a medication.     Please call to advise

## 2023-10-11 ENCOUNTER — TELEPHONE (OUTPATIENT)
Dept: OTOLARYNGOLOGY | Facility: CLINIC | Age: 63
End: 2023-10-11
Payer: COMMERCIAL

## 2023-10-11 NOTE — TELEPHONE ENCOUNTER
----- Message from oSnny Us sent at 10/11/2023 12:29 PM CDT -----  Regarding: Call back requested  Contact: [phone]  Pt is calling to speak with someone in provider office is asking for a return call please call pt at  [phone] to discuss a nasal spray Azelastine HCE

## 2023-10-16 ENCOUNTER — OFFICE VISIT (OUTPATIENT)
Dept: OTOLARYNGOLOGY | Facility: CLINIC | Age: 63
End: 2023-10-16
Payer: COMMERCIAL

## 2023-10-16 VITALS
BODY MASS INDEX: 26.23 KG/M2 | DIASTOLIC BLOOD PRESSURE: 70 MMHG | RESPIRATION RATE: 18 BRPM | WEIGHT: 209.88 LBS | HEART RATE: 66 BPM | SYSTOLIC BLOOD PRESSURE: 114 MMHG

## 2023-10-16 DIAGNOSIS — D14.0 INVERTED PAPILLOMA OF LATERAL NASAL WALL: ICD-10-CM

## 2023-10-16 DIAGNOSIS — J32.0 CHRONIC MAXILLARY SINUSITIS: ICD-10-CM

## 2023-10-16 DIAGNOSIS — Z98.890 HISTORY OF SINUS SURGERY: ICD-10-CM

## 2023-10-16 DIAGNOSIS — J30.9 ALLERGIC RHINITIS, UNSPECIFIED SEASONALITY, UNSPECIFIED TRIGGER: Primary | ICD-10-CM

## 2023-10-16 PROCEDURE — 31231 NASAL ENDOSCOPY DX: CPT | Mod: S$GLB,,, | Performed by: STUDENT IN AN ORGANIZED HEALTH CARE EDUCATION/TRAINING PROGRAM

## 2023-10-16 PROCEDURE — 3074F SYST BP LT 130 MM HG: CPT | Mod: CPTII,S$GLB,, | Performed by: STUDENT IN AN ORGANIZED HEALTH CARE EDUCATION/TRAINING PROGRAM

## 2023-10-16 PROCEDURE — 3044F HG A1C LEVEL LT 7.0%: CPT | Mod: CPTII,S$GLB,, | Performed by: STUDENT IN AN ORGANIZED HEALTH CARE EDUCATION/TRAINING PROGRAM

## 2023-10-16 PROCEDURE — 99214 OFFICE O/P EST MOD 30 MIN: CPT | Mod: 25,S$GLB,, | Performed by: STUDENT IN AN ORGANIZED HEALTH CARE EDUCATION/TRAINING PROGRAM

## 2023-10-16 PROCEDURE — 99999 PR PBB SHADOW E&M-EST. PATIENT-LVL III: ICD-10-PCS | Mod: PBBFAC,,, | Performed by: STUDENT IN AN ORGANIZED HEALTH CARE EDUCATION/TRAINING PROGRAM

## 2023-10-16 PROCEDURE — 3044F PR MOST RECENT HEMOGLOBIN A1C LEVEL <7.0%: ICD-10-PCS | Mod: CPTII,S$GLB,, | Performed by: STUDENT IN AN ORGANIZED HEALTH CARE EDUCATION/TRAINING PROGRAM

## 2023-10-16 PROCEDURE — 99999 PR PBB SHADOW E&M-EST. PATIENT-LVL III: CPT | Mod: PBBFAC,,, | Performed by: STUDENT IN AN ORGANIZED HEALTH CARE EDUCATION/TRAINING PROGRAM

## 2023-10-16 PROCEDURE — 1160F PR REVIEW ALL MEDS BY PRESCRIBER/CLIN PHARMACIST DOCUMENTED: ICD-10-PCS | Mod: CPTII,S$GLB,, | Performed by: STUDENT IN AN ORGANIZED HEALTH CARE EDUCATION/TRAINING PROGRAM

## 2023-10-16 PROCEDURE — 3078F DIAST BP <80 MM HG: CPT | Mod: CPTII,S$GLB,, | Performed by: STUDENT IN AN ORGANIZED HEALTH CARE EDUCATION/TRAINING PROGRAM

## 2023-10-16 PROCEDURE — 1159F MED LIST DOCD IN RCRD: CPT | Mod: CPTII,S$GLB,, | Performed by: STUDENT IN AN ORGANIZED HEALTH CARE EDUCATION/TRAINING PROGRAM

## 2023-10-16 PROCEDURE — 1159F PR MEDICATION LIST DOCUMENTED IN MEDICAL RECORD: ICD-10-PCS | Mod: CPTII,S$GLB,, | Performed by: STUDENT IN AN ORGANIZED HEALTH CARE EDUCATION/TRAINING PROGRAM

## 2023-10-16 PROCEDURE — 1160F RVW MEDS BY RX/DR IN RCRD: CPT | Mod: CPTII,S$GLB,, | Performed by: STUDENT IN AN ORGANIZED HEALTH CARE EDUCATION/TRAINING PROGRAM

## 2023-10-16 PROCEDURE — 31231 PR NASAL ENDOSCOPY, DX: ICD-10-PCS | Mod: S$GLB,,, | Performed by: STUDENT IN AN ORGANIZED HEALTH CARE EDUCATION/TRAINING PROGRAM

## 2023-10-16 PROCEDURE — 99214 PR OFFICE/OUTPT VISIT, EST, LEVL IV, 30-39 MIN: ICD-10-PCS | Mod: 25,S$GLB,, | Performed by: STUDENT IN AN ORGANIZED HEALTH CARE EDUCATION/TRAINING PROGRAM

## 2023-10-16 PROCEDURE — 3008F PR BODY MASS INDEX (BMI) DOCUMENTED: ICD-10-PCS | Mod: CPTII,S$GLB,, | Performed by: STUDENT IN AN ORGANIZED HEALTH CARE EDUCATION/TRAINING PROGRAM

## 2023-10-16 PROCEDURE — 3008F BODY MASS INDEX DOCD: CPT | Mod: CPTII,S$GLB,, | Performed by: STUDENT IN AN ORGANIZED HEALTH CARE EDUCATION/TRAINING PROGRAM

## 2023-10-16 PROCEDURE — 3074F PR MOST RECENT SYSTOLIC BLOOD PRESSURE < 130 MM HG: ICD-10-PCS | Mod: CPTII,S$GLB,, | Performed by: STUDENT IN AN ORGANIZED HEALTH CARE EDUCATION/TRAINING PROGRAM

## 2023-10-16 PROCEDURE — 3078F PR MOST RECENT DIASTOLIC BLOOD PRESSURE < 80 MM HG: ICD-10-PCS | Mod: CPTII,S$GLB,, | Performed by: STUDENT IN AN ORGANIZED HEALTH CARE EDUCATION/TRAINING PROGRAM

## 2023-10-16 RX ORDER — AZELASTINE 1 MG/ML
1 SPRAY, METERED NASAL 2 TIMES DAILY
Qty: 30 ML | Refills: 6 | Status: SHIPPED | OUTPATIENT
Start: 2023-10-16 | End: 2023-10-18

## 2023-10-16 NOTE — PROGRESS NOTES
Subjective:      Osbaldo is a 62 y.o. male who comes for follow-up of  allergic rhinitis and right inverting papilloma .  His last visit with me was on 6/20/2023.  Doing well. No new nasal obstruction. No epistaxis.     Briefly he had right IP that was found incidentally after endoscopic sinus surgery that was performed on 3/31/23.    His current sinus regime consists of: Floanse & astelin.     The assessment of quality and severity of symptoms as measured by the SNOT-22 score is 16.    The patient's medications, allergies, past medical, surgical, social and family histories were reviewed and updated as appropriate.    A detailed review of systems was obtained with pertinent positives as per the above HPI, and otherwise negative.        Objective:     /70 (BP Location: Right arm, Patient Position: Sitting, BP Method: Small (Automatic))   Pulse 66   Resp 18   Wt 95.2 kg (209 lb 14.1 oz)   BMI 26.23 kg/m²        Constitutional:   Vital signs are normal. He appears well-developed and well-nourished.     Head:  Normocephalic and atraumatic.     Ears:  Hearing normal to normal and whispered voice; external ear normal without scars, lesions, or masses; ear canal, tympanic membrane, and middle ear normal..   Right Ear: No swelling. Tympanic membrane is not perforated and not bulging. No middle ear effusion.   Left Ear: No swelling. Tympanic membrane is not perforated and not bulging.  No middle ear effusion.     Nose:  Nose normal including turbinates, nasal mucosa, sinuses and nasal septum. No epistaxis.     Mouth/Throat  Oropharynx clear and moist without lesions or asymmetry and normal uvula midline. Normal dentition. No tonsillar abscesses. Tonsillar exudate.      Neck:  Neck normal without thyromegaly masses, asymmetry, normal tracheal structure, crepitus, and tenderness, thyroid normal, trachea normal, phonation normal, full range of motion with neck supple and no adenopathy. No stridor present.         Head (right side): No submental adenopathy present.        Head (left side): No submental adenopathy present.     He has no cervical adenopathy.     Pulmonary/Chest:   No stridor.       Procedure    Nasal endoscopy performed.  See procedure note.    Nasal Endoscopy:  10/16/2023    The use of diagnostic nasal endoscopy was considered medically necessary for the evaluation and visualization of the nasal anatomy for symptoms suggestive of nasal or sinus origin. Physical examination (including a nasal speculum evaluation) did not provide sufficient clinical information to establish a diagnosis, or symptoms did not improve or worsened following treatment.     The nasal cavity was decongested with topical 1% phenylephrine and anesthetized with 4% lidocaine.  A rigid 0-degree endoscope was introduced into the nasal cavity.    The patient was seated in the examination chair. After discussion of risks and benefits, a nasal endoscope was inserted into the nose the endoscope was passed along the left nasal floor to the nasopharynx. It was then passed between the middle and superior meatus, nasal turbinates, nasal septum, nasopharynx and sphenoethmoid region. The nasal endoscope was withdrawn and there was no complications. An identical procedure was performed on the right side. I was present for the entire procedure.The patient tolerated the above procedure well. The findings of this procedure can be found in the dictated note from 10/16/2023 visit.                                    Patient right ethmoid and maxillary sinuses. Appropriate healing of the middle meatus. No obvious tumor seen in sinuses. No surgical changes to the left sinuses.    Data Reviewed    WBC (K/uL)   Date Value   03/03/2023 7.53     Eosinophil % (%)   Date Value   03/03/2023 1.9     Eos # (K/uL)   Date Value   03/03/2023 0.1     Platelets (K/uL)   Date Value   03/03/2023 236     Glucose (mg/dL)   Date Value   03/03/2023 88     IgE (IU/mL)   Date Value    03/03/2023 <35       No sinus imaging available.      Assessment:     1. Allergic rhinitis, unspecified seasonality, unspecified trigger    2. Inverted papilloma of lateral nasal wall    3. Chronic maxillary sinusitis    4. History of sinus surgery         Plan:     - RTC 1 year for endoscopy  - Refilled astelin for his AR    Cory Ricci MD

## 2023-10-18 RX ORDER — AZELASTINE 1 MG/ML
2 SPRAY, METERED NASAL 2 TIMES DAILY
Qty: 30 ML | Refills: 6 | Status: SHIPPED | OUTPATIENT
Start: 2023-10-18 | End: 2024-10-17

## 2024-03-08 ENCOUNTER — PATIENT MESSAGE (OUTPATIENT)
Dept: CARDIOLOGY | Facility: CLINIC | Age: 64
End: 2024-03-08
Payer: COMMERCIAL

## 2024-03-15 ENCOUNTER — TELEPHONE (OUTPATIENT)
Dept: PHARMACY | Facility: CLINIC | Age: 64
End: 2024-03-15
Payer: COMMERCIAL

## 2024-04-11 DIAGNOSIS — M79.672 LEFT FOOT PAIN: Primary | ICD-10-CM

## 2024-04-16 ENCOUNTER — HOSPITAL ENCOUNTER (OUTPATIENT)
Dept: RADIOLOGY | Facility: HOSPITAL | Age: 64
Discharge: HOME | End: 2024-04-16
Attending: ORTHOPAEDIC SURGERY
Payer: COMMERCIAL

## 2024-04-16 ENCOUNTER — OFFICE VISIT (OUTPATIENT)
Dept: ORTHOPEDICS | Facility: CLINIC | Age: 64
End: 2024-04-16
Payer: COMMERCIAL

## 2024-04-16 VITALS — BODY MASS INDEX: 25.99 KG/M2 | HEIGHT: 75 IN | WEIGHT: 209 LBS

## 2024-04-16 DIAGNOSIS — M79.672 LEFT FOOT PAIN: ICD-10-CM

## 2024-04-16 DIAGNOSIS — S92.352A CLOSED DISPLACED FRACTURE OF FIFTH METATARSAL BONE OF LEFT FOOT, INITIAL ENCOUNTER: ICD-10-CM

## 2024-04-16 PROCEDURE — 99204 OFFICE O/P NEW MOD 45 MIN: CPT | Mod: S$GLB,,, | Performed by: ORTHOPAEDIC SURGERY

## 2024-04-16 PROCEDURE — 1160F RVW MEDS BY RX/DR IN RCRD: CPT | Mod: CPTII,S$GLB,, | Performed by: ORTHOPAEDIC SURGERY

## 2024-04-16 PROCEDURE — 1159F MED LIST DOCD IN RCRD: CPT | Mod: CPTII,S$GLB,, | Performed by: ORTHOPAEDIC SURGERY

## 2024-04-16 PROCEDURE — 3008F BODY MASS INDEX DOCD: CPT | Mod: CPTII,S$GLB,, | Performed by: ORTHOPAEDIC SURGERY

## 2024-04-16 PROCEDURE — 99999 PR PBB SHADOW E&M-EST. PATIENT-LVL III: CPT | Mod: PBBFAC,,, | Performed by: ORTHOPAEDIC SURGERY

## 2024-04-18 ENCOUNTER — PATIENT MESSAGE (OUTPATIENT)
Dept: ORTHOPEDICS | Facility: CLINIC | Age: 64
End: 2024-04-18
Payer: COMMERCIAL

## 2024-04-22 ENCOUNTER — OFFICE VISIT (OUTPATIENT)
Dept: FAMILY MEDICINE | Facility: CLINIC | Age: 64
End: 2024-04-22
Payer: COMMERCIAL

## 2024-04-22 VITALS
BODY MASS INDEX: 25.74 KG/M2 | DIASTOLIC BLOOD PRESSURE: 76 MMHG | HEIGHT: 75 IN | SYSTOLIC BLOOD PRESSURE: 128 MMHG | HEART RATE: 74 BPM | OXYGEN SATURATION: 98 % | WEIGHT: 207 LBS

## 2024-04-22 DIAGNOSIS — N40.1 BPH ASSOCIATED WITH NOCTURIA: ICD-10-CM

## 2024-04-22 DIAGNOSIS — Z11.59 ENCOUNTER FOR HEPATITIS C SCREENING TEST FOR LOW RISK PATIENT: ICD-10-CM

## 2024-04-22 DIAGNOSIS — E78.2 MIXED HYPERLIPIDEMIA: ICD-10-CM

## 2024-04-22 DIAGNOSIS — Z00.00 WELL ADULT EXAM: Primary | ICD-10-CM

## 2024-04-22 DIAGNOSIS — Z12.5 SCREENING FOR PROSTATE CANCER: ICD-10-CM

## 2024-04-22 DIAGNOSIS — R35.1 BPH ASSOCIATED WITH NOCTURIA: ICD-10-CM

## 2024-04-22 DIAGNOSIS — R79.89 ELEVATED TSH: ICD-10-CM

## 2024-04-22 DIAGNOSIS — D80.4 IGM DEFICIENCY: ICD-10-CM

## 2024-04-22 DIAGNOSIS — Z11.4 SCREENING FOR HIV (HUMAN IMMUNODEFICIENCY VIRUS): ICD-10-CM

## 2024-04-22 DIAGNOSIS — N52.9 ERECTILE DYSFUNCTION, UNSPECIFIED ERECTILE DYSFUNCTION TYPE: ICD-10-CM

## 2024-04-22 PROCEDURE — 1160F RVW MEDS BY RX/DR IN RCRD: CPT | Mod: CPTII,S$GLB,, | Performed by: FAMILY MEDICINE

## 2024-04-22 PROCEDURE — 99396 PREV VISIT EST AGE 40-64: CPT | Mod: S$GLB,,, | Performed by: FAMILY MEDICINE

## 2024-04-22 PROCEDURE — 99999 PR PBB SHADOW E&M-EST. PATIENT-LVL III: CPT | Mod: PBBFAC,,, | Performed by: FAMILY MEDICINE

## 2024-04-22 PROCEDURE — 3078F DIAST BP <80 MM HG: CPT | Mod: CPTII,S$GLB,, | Performed by: FAMILY MEDICINE

## 2024-04-22 PROCEDURE — 3074F SYST BP LT 130 MM HG: CPT | Mod: CPTII,S$GLB,, | Performed by: FAMILY MEDICINE

## 2024-04-22 PROCEDURE — 3008F BODY MASS INDEX DOCD: CPT | Mod: CPTII,S$GLB,, | Performed by: FAMILY MEDICINE

## 2024-04-22 PROCEDURE — 1159F MED LIST DOCD IN RCRD: CPT | Mod: CPTII,S$GLB,, | Performed by: FAMILY MEDICINE

## 2024-04-22 RX ORDER — TAMSULOSIN HYDROCHLORIDE 0.4 MG/1
0.4 CAPSULE ORAL DAILY
Qty: 90 CAPSULE | Refills: 3 | Status: SHIPPED | OUTPATIENT
Start: 2024-04-22 | End: 2025-04-22

## 2024-04-22 RX ORDER — TADALAFIL 5 MG/1
5 TABLET ORAL DAILY PRN
Qty: 90 TABLET | Refills: 3 | Status: SHIPPED | OUTPATIENT
Start: 2024-04-22

## 2024-04-22 RX ORDER — ATORVASTATIN CALCIUM 40 MG/1
40 TABLET, FILM COATED ORAL DAILY
Qty: 90 TABLET | Refills: 3 | Status: SHIPPED | OUTPATIENT
Start: 2024-04-22

## 2024-04-22 NOTE — PROGRESS NOTES
"Subjective:       Patient ID: Osbaldo Brewer is a 63 y.o. male.    Chief Complaint: Hyperlipidemia and Mass (On right hip)    Here today for his annual well visit.  He recently had a syncopal episode and suffered a 5th metatarsal fracture.  Went to ER.  CT showed a sinus infection and he was prescribed Augmentin.  Finished yesterday.  Immunizations: Due Shingrix and RSV  Last Lab Work: 2024  Colon Ca screening: Colonoscopy due 2025  Prostate Ca Screening: PSA 3/23     HLD: He is on Lipitor 40 mg.  Cholesterol was in range 3/2023.  He saw cardiologist here.    Chronic Sinusitis: Seeing ENT.  S/p sinus surgery 3/2023.  Culture positive for Pseudomonas.  saw Immunologist (Quresh)  Having issues with frequent urination.      Hyperlipidemia  Pertinent negatives include no chest pain or shortness of breath.   Mass  Pertinent negatives include no abdominal pain, chest pain, congestion, coughing, fatigue, fever, headaches, nausea, sore throat, vomiting or weakness.     Review of Systems   Constitutional:  Negative for appetite change, fatigue and fever.   HENT:  Negative for congestion, sneezing and sore throat.    Respiratory:  Negative for cough, shortness of breath and wheezing.    Cardiovascular:  Negative for chest pain and palpitations.   Gastrointestinal:  Negative for abdominal pain, constipation, diarrhea, nausea and vomiting.   Genitourinary:  Negative for difficulty urinating, dysuria, frequency and hematuria.   Neurological:  Negative for dizziness, syncope, weakness and headaches.   Psychiatric/Behavioral:  Negative for agitation, behavioral problems and confusion. The patient is not nervous/anxious.        Objective:      Vitals:    04/22/24 1453   BP: 128/76   Pulse: 74   SpO2: 98%   Weight: 93.9 kg (207 lb 0.2 oz)   Height: 6' 3" (1.905 m)      Physical Exam  Constitutional:       General: He is not in acute distress.  Cardiovascular:      Rate and Rhythm: Normal rate and regular rhythm.      Heart " sounds: Normal heart sounds. No murmur heard.  Pulmonary:      Effort: Pulmonary effort is normal. No respiratory distress.      Breath sounds: Normal breath sounds. No wheezing, rhonchi or rales.   Musculoskeletal:      Comments: In walking boot   Skin:     General: Skin is warm and dry.      Findings: Lesion (lipoma to right hip) present.   Neurological:      General: No focal deficit present.      Mental Status: He is alert.   Psychiatric:         Mood and Affect: Mood normal.         Behavior: Behavior normal.         Thought Content: Thought content normal.         Results for orders placed or performed during the hospital encounter of 04/11/24   Magnesium   Result Value Ref Range    Magnesium 2.1 1.6 - 2.6 mg/dL   CBC auto differential   Result Value Ref Range    WBC 7.56 3.90 - 12.70 K/uL    RBC 4.71 4.60 - 6.20 M/uL    Hemoglobin 14.2 14.0 - 18.0 g/dL    Hematocrit 43.0 40.0 - 54.0 %    MCV 91 82 - 98 fL    MCH 30.1 27.0 - 31.0 pg    MCHC 33.0 32.0 - 36.0 g/dL    RDW 13.3 11.5 - 14.5 %    Platelets 234 150 - 450 K/uL    MPV 10.0 9.2 - 12.9 fL    Immature Granulocytes 0.3 0.0 - 0.5 %    Gran # (ANC) 4.8 1.8 - 7.7 K/uL    Immature Grans (Abs) 0.02 0.00 - 0.04 K/uL    Lymph # 1.9 1.0 - 4.8 K/uL    Mono # 0.6 0.3 - 1.0 K/uL    Eos # 0.2 0.0 - 0.5 K/uL    Baso # 0.04 0.00 - 0.20 K/uL    nRBC 0 0 /100 WBC    Gran % 63.7 38.0 - 73.0 %    Lymph % 25.4 18.0 - 48.0 %    Mono % 7.7 4.0 - 15.0 %    Eosinophil % 2.4 0.0 - 8.0 %    Basophil % 0.5 0.0 - 1.9 %    Differential Method Automated    Comprehensive metabolic panel   Result Value Ref Range    Sodium 140 136 - 145 mmol/L    Potassium 3.9 3.5 - 5.1 mmol/L    Chloride 105 95 - 110 mmol/L    CO2 28 22 - 31 mmol/L    Glucose 105 70 - 110 mg/dL    BUN 12 9 - 21 mg/dL    Creatinine 0.79 0.50 - 1.40 mg/dL    Calcium 9.3 8.4 - 10.2 mg/dL    Total Protein 7.8 6.0 - 8.4 g/dL    Albumin 4.5 3.5 - 5.2 g/dL    Total Bilirubin 0.5 0.2 - 1.3 mg/dL    Alkaline Phosphatase 68 38 -  145 U/L    AST 39 17 - 59 U/L    ALT 32 0 - 50 U/L    Anion Gap 7 5 - 12 mmol/L    eGFR >60 >60 mL/min/1.73 m^2   Troponin   Result Value Ref Range    Troponin I <0.012 0.012 - 0.034 ng/mL   Troponin in 2 Hours   Result Value Ref Range    Troponin I <0.012 0.012 - 0.034 ng/mL   EKG 12-lead   Result Value Ref Range    QRS Duration 86 ms    [facility] QTC Calculation 399 ms      Assessment:       1. Well adult exam    2. Mixed hyperlipidemia    3. Elevated TSH    4. Screening for prostate cancer    5. IgM deficiency    6. BPH associated with nocturia    7. Erectile dysfunction, unspecified erectile dysfunction type    8. Encounter for hepatitis C screening test for low risk patient    9. Screening for HIV (human immunodeficiency virus)        Plan:       Well adult exam  -     Hemoglobin A1C; Future; Expected date: 04/22/2024  -     Lipid Panel; Future; Expected date: 04/22/2024  -     PSA, Screening; Future; Expected date: 04/22/2024  -     TSH; Future; Expected date: 04/22/2024  -     Urinalysis, Reflex to Urine Culture Urine, Clean Catch; Future; Expected date: 04/22/2024  Continue to work on dietary improvements (decrease overall calorie intake, decrease sugar and carb intake, decrease animal protein intake)  Continue to exercise at least 30-40 minutes, 3 times per week  Immunizations were discussed and were up to date  Preventative exams were discussed and up to date   Mixed hyperlipidemia  -     atorvastatin (LIPITOR) 40 MG tablet; Take 1 tablet (40 mg total) by mouth once daily.  Dispense: 90 tablet; Refill: 3  Continue Lipitor  Elevated TSH  -     TSH; Future; Expected date: 04/22/2024    Screening for prostate cancer  -     PSA, Screening; Future; Expected date: 04/22/2024    IgM deficiency    BPH associated with nocturia  -     tadalafiL (CIALIS) 5 MG tablet; Take 1 tablet (5 mg total) by mouth daily as needed for Erectile Dysfunction.  Dispense: 90 tablet; Refill: 3  -     tamsulosin (FLOMAX) 0.4 mg Cap; Take 1  capsule (0.4 mg total) by mouth once daily.  Dispense: 90 capsule; Refill: 3  Trial of Flomax  Erectile dysfunction, unspecified erectile dysfunction type  -     tadalafiL (CIALIS) 5 MG tablet; Take 1 tablet (5 mg total) by mouth daily as needed for Erectile Dysfunction.  Dispense: 90 tablet; Refill: 3    Encounter for hepatitis C screening test for low risk patient  -     Hepatitis C Antibody; Future; Expected date: 04/22/2024    Screening for HIV (human immunodeficiency virus)  -     HIV 1/2 Ag/Ab (4th Gen); Future; Expected date: 04/22/2024          Medication List with Changes/Refills   New Medications    TAMSULOSIN (FLOMAX) 0.4 MG CAP    Take 1 capsule (0.4 mg total) by mouth once daily.   Current Medications    AZELASTINE (ASTELIN) 137 MCG (0.1 %) NASAL SPRAY    2 sprays (274 mcg total) by Nasal route 2 (two) times daily.    COQ10, UBIQUINOL, ORAL    Take by mouth.    KETOCONAZOLE (NIZORAL) 2 % CREAM    Apply twice a day to affected areas of skin    PIMECROLIMUS (ELIDEL) 1 % CREAM    Apply topically 2 (two) times daily.   Changed and/or Refilled Medications    Modified Medication Previous Medication    ATORVASTATIN (LIPITOR) 40 MG TABLET atorvastatin (LIPITOR) 40 MG tablet       Take 1 tablet (40 mg total) by mouth once daily.    Take 1 tablet (40 mg total) by mouth once daily.    TADALAFIL (CIALIS) 5 MG TABLET tadalafiL (CIALIS) 5 MG tablet       Take 1 tablet (5 mg total) by mouth daily as needed for Erectile Dysfunction.    Take 1 tablet (5 mg total) by mouth daily as needed for Erectile Dysfunction.   Discontinued Medications    AMOXICILLIN-CLAVULANATE 875-125MG (AUGMENTIN) 875-125 MG PER TABLET    Take 1 tablet by mouth 2 (two) times daily.

## 2024-04-29 NOTE — PROGRESS NOTES
Status/Diagnosis: Displaced Left 5th MT diaphyseal fracture.  Date of Surgery: none  Date of Injury: 04/10/2024  Return visit: 2 weeks  X-rays on Return: [facility] 3-views Left foot    Chief Complaint:   Chief Complaint   Patient presents with    Left Foot - Pain     Present History:  Patient presents today via referral from Dr. Myra Almodovar   Osbaldo Brewer is a 63 y.o. male with acute onset left lateral forefoot pain.  Patient actually endorses a syncopal episode that he relates to be dehydrated on 04/10/2024.  Immediate pain and swelling about the left foot.  X-rays taken at the emergency department were consistent with fracture.  He was placed into a splint with instructions for outpatient orthopedic follow-up.  Denies any prior syncopal episodes.  No foot pain or instability prior to the above.    Pain currently 0/10.  Denies any numbness or tingling.    Denies tobacco use.  Works as a .      Past Medical History:   Diagnosis Date    Basal cell carcinoma     BPH associated with nocturia 01/11/2023    Erectile dysfunction 01/11/2023    History of Helicobacter pylori infection 01/11/2023    History of sinus surgery 01/11/2023    Mixed hyperlipidemia 08/29/2014       Past Surgical History:   Procedure Laterality Date    FUNCTIONAL ENDOSCOPIC SINUS SURGERY (FESS) USING COMPUTER-ASSISTED NAVIGATION Bilateral 3/31/2023    Procedure: SINUS SURGERY FUNCTIONAL ENDOSCOPIC WITH NAVIGATION;  Surgeon: Neville Navarrete MD;  Location: [facility] OR;  Service: ENT;  Laterality: Bilateral;    NASAL SEPTOPLASTY Bilateral 3/31/2023    Procedure: SEPTOPLASTY;  Surgeon: Neville Navarrete MD;  Location: [facility] OR;  Service: ENT;  Laterality: Bilateral;    SINUS SURGERY      WISDOM TOOTH EXTRACTION      states woke up during procedure       Current Outpatient Medications   Medication Sig Dispense Refill    atorvastatin (LIPITOR) 40 MG tablet Take 1 tablet (40 mg total) by mouth once daily. 90 tablet 3    azelastine (ASTELIN)  137 mcg (0.1 %) nasal spray 2 sprays (274 mcg total) by Nasal route 2 (two) times daily. 30 mL 6    COQ10, UBIQUINOL, ORAL Take by mouth.      ketoconazole (NIZORAL) 2 % cream Apply twice a day to affected areas of skin 60 g 2    pimecrolimus (ELIDEL) 1 % cream Apply topically 2 (two) times daily. 60 g 2    tadalafiL (CIALIS) 5 MG tablet Take 1 tablet (5 mg total) by mouth daily as needed for Erectile Dysfunction. 90 tablet 3    tamsulosin (FLOMAX) 0.4 mg Cap Take 1 capsule (0.4 mg total) by mouth once daily. 90 capsule 3     No current facility-administered medications for this visit.       Review of patient's allergies indicates:   Allergen Reactions    Oxycodone-acetaminophen Rash       No family history on file.    Social History     Socioeconomic History    Marital status:    Tobacco Use    Smoking status: Never    Smokeless tobacco: Never   Substance and Sexual Activity    Alcohol use: Yes     Comment: occ    Drug use: Never       Physical exam:  There were no vitals filed for this visit.  Body mass index is 26.12 kg/m².  General: In no apparent distress; well developed and well nourished.  HEENT: normocephalic; atraumatic.  Cardiovascular: regular rate.  Respiratory: no increased work of breathing.  Musculoskeletal:   Gait: did not assess  Inspection:   Moderate residual swelling and ecchymosis about the left lateral forefoot.  Tenderness on deep palpation at the distal 5th metatarsal diaphysis.  No pain referable to the ankle.  No laxity with anterior drawer testing.  No ATFL tenderness.  Mild-to-moderate pain with resisted ankle dorsiflexion/eversion but with 5/5 strength.  Silfverskiold: Negative  Strength:              Dorsiflexion 5/5  Plantar flexion 5/5  Inversion 5/5  Eversion 5/5  Sensation:              SILT distally  ROM:              Ankle: full and painless              Subtalar: full and painless  Pulses: Palpable pedal pulse                   Imaging Studies/Outside documentation:  I  have ordered/reviewed/interpreted the following images/outside documentation:  1. NON-weight bearing 3-views of Left foot:  On my independent review, acute long spiral fracture of the 5th metatarsal distal diaphysis.  Proximally 80% medial translation of the distal fragment.  4 mm of shortening.  No evidence of intra-articular involvement.        Assessment:  Osbaldo Brewer is a 63 y.o. male with Displaced Left 5th MT diaphyseal fracture.     Plan:   Clinical and radiographic findings were discussed at length with the patient today.    Operative versus nonoperative treatment options were described.    Given the amount of displacement, I did recommend surgical intervention for improved alignment and healing.    We discussed the natural history of this injury and implications regarding malunion should the fracture heal in its current position.    Given the patient's occupation and it being his busy season, he was like to defer surgical intervention if at all possible.    Based on the above, we will transition him to a short boot.  Patient to remain strict nonweightbearing left lower extremity.  May remove for hygiene and home ankle range of motion exercises.    Discussed rest, ice, compression, elevation, over-the-counter oral Tylenol as needed for pain.    Patient voiced understanding.  All questions were answered.  Return to clinic in 2 weeks for repeat evaluation and nonweightbearing x-rays.    We performed a custom orthotic/brace fitting, adjusting and training with the patient. The patient demonstrated understanding and proper care. This was performed for 15 minutes.    This note was created using voice recognition software and may contain grammatical errors.

## 2024-05-01 DIAGNOSIS — S92.352A CLOSED DISPLACED FRACTURE OF FIFTH METATARSAL BONE OF LEFT FOOT, INITIAL ENCOUNTER: Primary | ICD-10-CM

## 2024-05-02 ENCOUNTER — OFFICE VISIT (OUTPATIENT)
Dept: ORTHOPEDICS | Facility: CLINIC | Age: 64
End: 2024-05-02
Payer: COMMERCIAL

## 2024-05-02 ENCOUNTER — HOSPITAL ENCOUNTER (OUTPATIENT)
Dept: RADIOLOGY | Facility: HOSPITAL | Age: 64
Discharge: HOME | End: 2024-05-02
Attending: ORTHOPAEDIC SURGERY
Payer: COMMERCIAL

## 2024-05-02 VITALS — BODY MASS INDEX: 25.74 KG/M2 | HEIGHT: 75 IN | WEIGHT: 207 LBS

## 2024-05-02 DIAGNOSIS — S92.352A CLOSED DISPLACED FRACTURE OF FIFTH METATARSAL BONE OF LEFT FOOT, INITIAL ENCOUNTER: ICD-10-CM

## 2024-05-02 DIAGNOSIS — S92.352A CLOSED DISPLACED FRACTURE OF FIFTH METATARSAL BONE OF LEFT FOOT, INITIAL ENCOUNTER: Primary | ICD-10-CM

## 2024-05-02 PROCEDURE — 99213 OFFICE O/P EST LOW 20 MIN: CPT | Mod: S$GLB,,, | Performed by: ORTHOPAEDIC SURGERY

## 2024-05-02 PROCEDURE — 3008F BODY MASS INDEX DOCD: CPT | Mod: CPTII,S$GLB,, | Performed by: ORTHOPAEDIC SURGERY

## 2024-05-02 PROCEDURE — 73630 X-RAY EXAM OF FOOT: CPT | Mod: TC,PO,LT

## 2024-05-02 PROCEDURE — 1159F MED LIST DOCD IN RCRD: CPT | Mod: CPTII,S$GLB,, | Performed by: ORTHOPAEDIC SURGERY

## 2024-05-02 PROCEDURE — 1160F RVW MEDS BY RX/DR IN RCRD: CPT | Mod: CPTII,S$GLB,, | Performed by: ORTHOPAEDIC SURGERY

## 2024-05-02 PROCEDURE — 99999 PR PBB SHADOW E&M-EST. PATIENT-LVL III: CPT | Mod: PBBFAC,,, | Performed by: ORTHOPAEDIC SURGERY

## 2024-05-02 PROCEDURE — 73630 X-RAY EXAM OF FOOT: CPT | Mod: 26,LT,, | Performed by: RADIOLOGY

## 2024-05-02 RX ORDER — METHOCARBAMOL 500 MG/1
500 TABLET, FILM COATED ORAL 3 TIMES DAILY PRN
Qty: 21 TABLET | Refills: 0 | Status: SHIPPED | OUTPATIENT
Start: 2024-05-02 | End: 2024-05-09

## 2024-05-02 NOTE — PROGRESS NOTES
Status/Diagnosis: Displaced Left 5th MT diaphyseal fracture.  Date of Surgery: none  Date of Injury: 04/10/2024  Return visit: 3 weeks  X-rays on Return: NWB 3-views Left foot    Chief Complaint:   Chief Complaint   Patient presents with    Left Foot - Pain     Present History:  Patient presents today via referral from No ref. provider found   Osbaldo Brewer is a 63 y.o. male with acute onset left lateral forefoot pain.  Patient actually endorses a syncopal episode that he relates to be dehydrated on 04/10/2024.  Immediate pain and swelling about the left foot.  X-rays taken at the emergency department were consistent with fracture.  He was placed into a splint with instructions for outpatient orthopedic follow-up.  Denies any prior syncopal episodes.  No foot pain or instability prior to the above.    Pain currently 0/10.  Denies any numbness or tingling.    Denies tobacco use.  Works as a .    05/02/2024:  Patient returns today for repeat evaluation and x-ray.  Reports compliance with nonweightbearing status using his short boot and crutches.  Only minimal complaints of pain in regard to the foot.  Patient does have worsening bilateral low back pain with radiating symptoms down into the leg.  No new history of injury or other inciting event.      Past Medical History:   Diagnosis Date    Basal cell carcinoma     BPH associated with nocturia 01/11/2023    Erectile dysfunction 01/11/2023    History of Helicobacter pylori infection 01/11/2023    History of sinus surgery 01/11/2023    Mixed hyperlipidemia 08/29/2014       Past Surgical History:   Procedure Laterality Date    FUNCTIONAL ENDOSCOPIC SINUS SURGERY (FESS) USING COMPUTER-ASSISTED NAVIGATION Bilateral 3/31/2023    Procedure: SINUS SURGERY FUNCTIONAL ENDOSCOPIC WITH NAVIGATION;  Surgeon: Neville Navarrete MD;  Location: [facility] OR;  Service: ENT;  Laterality: Bilateral;    NASAL SEPTOPLASTY Bilateral 3/31/2023    Procedure: SEPTOPLASTY;   Surgeon: Neville Navarrete MD;  Location: [facility] OR;  Service: ENT;  Laterality: Bilateral;    SINUS SURGERY      WISDOM TOOTH EXTRACTION      states woke up during procedure       Current Outpatient Medications   Medication Sig Dispense Refill    atorvastatin (LIPITOR) 40 MG tablet Take 1 tablet (40 mg total) by mouth once daily. 90 tablet 3    azelastine (ASTELIN) 137 mcg (0.1 %) nasal spray 2 sprays (274 mcg total) by Nasal route 2 (two) times daily. 30 mL 6    COQ10, UBIQUINOL, ORAL Take by mouth.      ketoconazole (NIZORAL) 2 % cream Apply twice a day to affected areas of skin 60 g 2    pimecrolimus (ELIDEL) 1 % cream Apply topically 2 (two) times daily. 60 g 2    tadalafiL (CIALIS) 5 MG tablet Take 1 tablet (5 mg total) by mouth daily as needed for Erectile Dysfunction. 90 tablet 3    tamsulosin (FLOMAX) 0.4 mg Cap Take 1 capsule (0.4 mg total) by mouth once daily. 90 capsule 3     No current facility-administered medications for this visit.       Review of patient's allergies indicates:   Allergen Reactions    Oxycodone-acetaminophen Rash       No family history on file.    Social History     Socioeconomic History    Marital status:    Tobacco Use    Smoking status: Never    Smokeless tobacco: Never   Substance and Sexual Activity    Alcohol use: Yes     Comment: occ    Drug use: Never       Physical exam:  There were no vitals filed for this visit.  Body mass index is 25.87 kg/m².  General: In no apparent distress; well developed and well nourished.  HEENT: normocephalic; atraumatic.  Cardiovascular: regular rate.  Respiratory: no increased work of breathing.  Musculoskeletal:   Gait: did not assess  Inspection:   Mild residual swelling about the left lateral forefoot.  Mild-to-moderate residual tenderness on deep palpation at the distal 5th metatarsal diaphysis.  No pain referable to the ankle.  No laxity with anterior drawer testing.  No ATFL tenderness.  Mild-to-moderate pain with resisted ankle  dorsiflexion/eversion but with 5/5 strength.  Silfverskiold: Negative  Strength:              Dorsiflexion 5/5  Plantar flexion 5/5  Inversion 5/5  Eversion 5/5  Sensation:              SILT distally  ROM:              Ankle: full and painless              Subtalar: full and painless  Pulses: Palpable pedal pulse                   Imaging Studies/Outside documentation:  I have ordered/reviewed/interpreted the following images/outside documentation:  1. NON-weight bearing 3-views of Left foot:  On my independent review, persistent long spiral fracture of the 5th metatarsal distal diaphysis.  Approximately 80% medial translation of the distal fragment.  4 mm of shortening.  This is relatively unchanged versus new patient evaluation.  No signs of early callus or bone bridging present.        Assessment:  Osbaldo Brewer is a 63 y.o. male with Displaced Left 5th MT diaphyseal fracture.     Plan:   Clinical and radiographic findings were discussed at length with the patient today.    Overall alignment is well-maintained although no signs of early bone healing present on repeat x-ray.    At this time we will continue conservative management at patient's request.  Continue boot wear and strict nonweightbearing of the left lower extremity.  I did send in a prescription for a short course of muscle relaxers to help with primary complaint today of low back pain.    We will also place referral to my colleague for lumbar spine evaluation and possible treatment.    Patient voiced understanding.  All questions were answered.  Return to clinic in 3 weeks for repeat evaluation and x-ray.    Possible initiation of weight-bearing in the boot at that time.      This note was created using voice recognition software and may contain grammatical errors.

## 2024-05-07 ENCOUNTER — OFFICE VISIT (OUTPATIENT)
Dept: OTOLARYNGOLOGY | Facility: CLINIC | Age: 64
End: 2024-05-07
Payer: COMMERCIAL

## 2024-05-07 VITALS — WEIGHT: 207 LBS | HEIGHT: 75 IN | BODY MASS INDEX: 25.74 KG/M2

## 2024-05-07 DIAGNOSIS — J30.9 ALLERGIC RHINITIS, UNSPECIFIED SEASONALITY, UNSPECIFIED TRIGGER: Primary | ICD-10-CM

## 2024-05-07 DIAGNOSIS — D14.0 INVERTED PAPILLOMA OF LATERAL NASAL WALL: ICD-10-CM

## 2024-05-07 PROCEDURE — 31231 NASAL ENDOSCOPY DX: CPT | Mod: S$GLB,,, | Performed by: OTOLARYNGOLOGY

## 2024-05-07 PROCEDURE — 3044F HG A1C LEVEL LT 7.0%: CPT | Mod: CPTII,S$GLB,, | Performed by: OTOLARYNGOLOGY

## 2024-05-07 PROCEDURE — 1159F MED LIST DOCD IN RCRD: CPT | Mod: CPTII,S$GLB,, | Performed by: OTOLARYNGOLOGY

## 2024-05-07 PROCEDURE — 3008F BODY MASS INDEX DOCD: CPT | Mod: CPTII,S$GLB,, | Performed by: OTOLARYNGOLOGY

## 2024-05-07 PROCEDURE — 99999 PR PBB SHADOW E&M-EST. PATIENT-LVL II: CPT | Mod: PBBFAC,,, | Performed by: OTOLARYNGOLOGY

## 2024-05-07 PROCEDURE — 1160F RVW MEDS BY RX/DR IN RCRD: CPT | Mod: CPTII,S$GLB,, | Performed by: OTOLARYNGOLOGY

## 2024-05-07 PROCEDURE — 99213 OFFICE O/P EST LOW 20 MIN: CPT | Mod: 25,S$GLB,, | Performed by: OTOLARYNGOLOGY

## 2024-05-07 NOTE — PROGRESS NOTES
Subjective:       Patient ID: Osbaldo Brewer is a 63 y.o. male.    Chief Complaint: Sinus Problem and Sinusitis    Osbaldo is here for follow-up.   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   Also with AR, using Astelin.  Had a recent sinusitis, Rx Augmentin. Dx on imaging obtained for syncopal episode where he hit a counter and broke his foot.  Using saline, Astelin regularly doesn't notice how much improvement he has with his rhinitis      Patient validated questionnaires (if applicable):      %            No data to display                   No data to display                   No data to display                     Review of Systems   Constitutional: Negative for activity change and appetite change.   Respiratory: Negative for difficulty breathing and wheezing   Cardiovascular: Negative for chest pain.      Objective:        Constitutional:   Vital signs are normal. He appears well-developed and well-nourished.     Head:  Normocephalic and atraumatic.     Ears:  Hearing normal to normal and whispered voice; external ear normal without scars, lesions, or masses; ear canal, tympanic membrane, and middle ear normal..     Nose:  Nose normal including turbinates, nasal mucosa, sinuses and nasal septum.   Nasal endoscopy indicated to evaluate symptoms.    Mouth/Throat  Oropharynx clear and moist without lesions or asymmetry.     Neck:  Neck normal without thyromegaly masses, asymmetry, normal tracheal structure, crepitus, and tenderness.         Tests / Results:  Name: Osbaldo Brewer     Pre-procedure diagnosis: Allergic rhinitis, unspecified seasonality, unspecified trigger [J30.9]  Post-procedure diagnosis: Same    Procedure: Bilateral nasal endoscopy  Anesthesia:  4% Lidocaine and 0.25% Phenylephrine Topical    Indication: This procedure is indicated as anterior rhinoscopy is not sufficient to account for all of the patients symptoms.     Procedure: Risks, benefits, and  alternatives of the procedure were discussed with the patient, and consent was obtained to perform a nasal endoscopy.  The nasal cavity was sprayed with a topical decongestant and topical anesthetic. After adequate anesthesia was obtained, the scope was passed into each nostril independently.  The nasal cavities (including inferior turbinates, middle turbinates, inferior meatus, middle meatus, superior meatus) nasopharynx, choana, eustachian tube, fossa of Rosenmüller, and adenoids were examined. All findings were normal with exception of description below. At the end of the examination, the scope was removed. The patient tolerated the procedure well with no complications.     LEFT: no issues  RIGHT:  Patent maxillary antrostomy with no residual cancer edema.  No evidence of IP recurrence    Assessment:       1. Allergic rhinitis, unspecified seasonality, unspecified trigger    2. Inverted papilloma of lateral nasal wall          Plan:         His infection which was noted a few months ago has resolved.  Continue Astelin, though we did discuss trying Atrovent and can use 1 or both.  Continue surveillance for IP

## 2024-05-08 RX ORDER — IPRATROPIUM BROMIDE 42 UG/1
2 SPRAY, METERED NASAL 3 TIMES DAILY PRN
Qty: 15 ML | Refills: 11 | Status: SHIPPED | OUTPATIENT
Start: 2024-05-08

## 2024-05-16 DIAGNOSIS — S92.352A CLOSED DISPLACED FRACTURE OF FIFTH METATARSAL BONE OF LEFT FOOT, INITIAL ENCOUNTER: Primary | ICD-10-CM

## 2024-05-23 ENCOUNTER — HOSPITAL ENCOUNTER (OUTPATIENT)
Dept: RADIOLOGY | Facility: HOSPITAL | Age: 64
Discharge: HOME | End: 2024-05-23
Attending: ORTHOPAEDIC SURGERY
Payer: COMMERCIAL

## 2024-05-23 ENCOUNTER — OFFICE VISIT (OUTPATIENT)
Dept: ORTHOPEDICS | Facility: CLINIC | Age: 64
End: 2024-05-23
Payer: COMMERCIAL

## 2024-05-23 DIAGNOSIS — S92.352A CLOSED DISPLACED FRACTURE OF FIFTH METATARSAL BONE OF LEFT FOOT, INITIAL ENCOUNTER: ICD-10-CM

## 2024-05-23 DIAGNOSIS — S92.352A CLOSED DISPLACED FRACTURE OF FIFTH METATARSAL BONE OF LEFT FOOT, INITIAL ENCOUNTER: Primary | ICD-10-CM

## 2024-05-23 PROCEDURE — 73630 X-RAY EXAM OF FOOT: CPT | Mod: 26,LT,, | Performed by: RADIOLOGY

## 2024-05-23 PROCEDURE — 3044F HG A1C LEVEL LT 7.0%: CPT | Mod: CPTII,S$GLB,, | Performed by: ORTHOPAEDIC SURGERY

## 2024-05-23 PROCEDURE — 99213 OFFICE O/P EST LOW 20 MIN: CPT | Mod: S$GLB,,, | Performed by: ORTHOPAEDIC SURGERY

## 2024-05-23 PROCEDURE — 1159F MED LIST DOCD IN RCRD: CPT | Mod: CPTII,S$GLB,, | Performed by: ORTHOPAEDIC SURGERY

## 2024-05-23 PROCEDURE — 73630 X-RAY EXAM OF FOOT: CPT | Mod: TC,PO,LT

## 2024-05-23 PROCEDURE — 99999 PR PBB SHADOW E&M-EST. PATIENT-LVL II: CPT | Mod: PBBFAC,,, | Performed by: ORTHOPAEDIC SURGERY

## 2024-05-23 NOTE — PROGRESS NOTES
Status/Diagnosis: Displaced Left 5th MT diaphyseal fracture.  Date of Surgery: none  Date of Injury: 04/10/2024  Return visit: 1 month  X-rays on Return: WB 3-views Left foot    Chief Complaint:   Left foot pain    Present History:  Patient presents today via referral from No ref. provider found   Osbaldo Brewer is a 63 y.o. male with acute onset left lateral forefoot pain.  Patient actually endorses a syncopal episode that he relates to be dehydrated on 04/10/2024.  Immediate pain and swelling about the left foot.  X-rays taken at the emergency department were consistent with fracture.  He was placed into a splint with instructions for outpatient orthopedic follow-up.  Denies any prior syncopal episodes.  No foot pain or instability prior to the above.    Pain currently 0/10.  Denies any numbness or tingling.    Denies tobacco use.  Works as a .    05/02/2024:  Patient returns today for repeat evaluation and x-ray.  Reports compliance with nonweightbearing status using his short boot and crutches.  Only minimal complaints of pain in regard to the foot.  Patient does have worsening bilateral low back pain with radiating symptoms down into the leg.  No new history of injury or other inciting event.    05/23/2024:  Patient returns today for repeat evaluation and x-ray.  Mild improvement since last being seen.  4/10 pain.  Reports strict compliance with nonweightbearing status.  Wearing his short boot as instructed.  Continues to work full-time as a .      Past Medical History:   Diagnosis Date    Basal cell carcinoma     BPH associated with nocturia 01/11/2023    Erectile dysfunction 01/11/2023    History of Helicobacter pylori infection 01/11/2023    History of sinus surgery 01/11/2023    Mixed hyperlipidemia 08/29/2014       Past Surgical History:   Procedure Laterality Date    FUNCTIONAL ENDOSCOPIC SINUS SURGERY (FESS) USING COMPUTER-ASSISTED NAVIGATION Bilateral 3/31/2023     Procedure: SINUS SURGERY FUNCTIONAL ENDOSCOPIC WITH NAVIGATION;  Surgeon: Neville Navarrete MD;  Location: [facility] OR;  Service: ENT;  Laterality: Bilateral;    NASAL SEPTOPLASTY Bilateral 3/31/2023    Procedure: SEPTOPLASTY;  Surgeon: Neville Navarrete MD;  Location: [facility] OR;  Service: ENT;  Laterality: Bilateral;    SINUS SURGERY      WISDOM TOOTH EXTRACTION      states woke up during procedure       Current Outpatient Medications   Medication Sig    atorvastatin (LIPITOR) 40 MG tablet Take 1 tablet (40 mg total) by mouth once daily.    azelastine (ASTELIN) 137 mcg (0.1 %) nasal spray 2 sprays (274 mcg total) by Nasal route 2 (two) times daily.    COQ10, UBIQUINOL, ORAL Take by mouth.    ipratropium (ATROVENT) 42 mcg (0.06 %) nasal spray 2 sprays by Each Nostril route 3 (three) times daily as needed (runny nose (rhinitis)).    ketoconazole (NIZORAL) 2 % cream Apply twice a day to affected areas of skin    pimecrolimus (ELIDEL) 1 % cream Apply topically 2 (two) times daily.    tadalafiL (CIALIS) 5 MG tablet Take 1 tablet (5 mg total) by mouth daily as needed for Erectile Dysfunction.    tamsulosin (FLOMAX) 0.4 mg Cap Take 1 capsule (0.4 mg total) by mouth once daily.     No current facility-administered medications for this visit.       Review of patient's allergies indicates:   Allergen Reactions    Oxycodone-acetaminophen Rash       No family history on file.    Social History     Socioeconomic History    Marital status:    Tobacco Use    Smoking status: Never    Smokeless tobacco: Never   Substance and Sexual Activity    Alcohol use: Yes     Comment: occ    Drug use: Never       Physical exam:  There were no vitals filed for this visit.  There is no height or weight on file to calculate BMI.  General: In no apparent distress; well developed and well nourished.  HEENT: normocephalic; atraumatic.  Cardiovascular: regular rate.  Respiratory: no increased work of breathing.  Musculoskeletal:   Gait: did not  assess  Inspection:   Mild residual swelling about the left lateral forefoot.  Mild residual tenderness on deep palpation at the distal 5th metatarsal diaphysis.  No pain referable to the ankle.  No laxity with anterior drawer testing.  No ATFL tenderness.  Some pain with resisted ankle dorsiflexion/eversion but with 5/5 strength.  Silfverskiold: Negative  Strength:              Dorsiflexion 5/5  Plantar flexion 5/5  Inversion 5/5  Eversion 5/5  Sensation:              SILT distally  ROM:              Ankle: full and painless              Subtalar: full and painless  Pulses: Palpable pedal pulse                   Imaging Studies/Outside documentation:  I have ordered/reviewed/interpreted the following images/outside documentation:  1. NON-weight bearing 3-views of Left foot:  On my independent review, persistent long spiral fracture of the 5th metatarsal distal diaphysis.  Approximately 80% medial translation of the distal fragment.  4 mm of shortening.  Unchanged versus last clinic visit.  Questionable early callus formation.          Assessment:  Osbaldo Brewer is a 63 y.o. male with Displaced Left 5th MT diaphyseal fracture.     Plan:   Clinical and radiographic findings were discussed at length with the patient and his wife who accompanies him today.    Again overall alignment well-maintained but questionable early signs of bone healing.    Patient to continue short boot wear.  We will allow him to begin heel weight-bearing for the next 7-10 days at which time he may begin progressive weight-bearing as tolerated.  Recommend he was the crutches as needed.    Informed to let pain be his guide.    Any weight-bearing going forward is to be performed in the boot.    Patient voiced understanding.  All questions were answered.  Return to clinic in 1 month for repeat evaluation and x-ray.      This note was created using voice recognition software and may contain grammatical errors.

## 2024-06-14 ENCOUNTER — PATIENT MESSAGE (OUTPATIENT)
Dept: CARDIOLOGY | Facility: CLINIC | Age: 64
End: 2024-06-14
Payer: COMMERCIAL

## 2024-06-18 NOTE — PROGRESS NOTES
Subjective:    Patient ID:  Osbaldo Brewer is a 63 y.o. male who presents for follow-up of No chief complaint on file.      TELEMEDICINE VISIT      Problem List Items Addressed This Visit          Cardiac/Vascular    Mixed hyperlipidemia - Primary (Chronic)     Other Visit Diagnoses       Encounter for cardiac risk counseling                HPI    Patient was last seen on 07/07/2023 at which time he was doing okay from a cardiac standpoint.    Patient presents for telemedicine visit.    On assessment today, the patient states that he feels OK.    No chest pain.  No shortness of breath.    Had foot injury April 10th/11th after a fall. Thinks it was dehydration. Doing OK since.    Inverted papilloma being monitored.    Home BP - Doing OK        Objective:   There were no vitals filed for this visit.    BP Readings from Last 5 Encounters:   04/22/24 128/76   04/11/24 (!) 146/83   10/16/23 114/70   06/20/23 117/78   06/05/23 108/68        Physical Exam  Constitutional:       General: He is not in acute distress.     Appearance: He is well-developed. He is not diaphoretic.   HENT:      Head: Normocephalic and atraumatic.   Eyes:      General: No scleral icterus.     Conjunctiva/sclera: Conjunctivae normal.   Pulmonary:      Effort: No respiratory distress.      Breath sounds: No stridor.   Musculoskeletal:         General: No signs of injury.      Cervical back: Normal range of motion.   Skin:     Coloration: Skin is not jaundiced.   Neurological:      Mental Status: He is alert. Mental status is at baseline.   Psychiatric:         Mood and Affect: Mood normal.         Behavior: Behavior normal.             Current Outpatient Medications   Medication Instructions    atorvastatin (LIPITOR) 40 mg, Oral, Daily    azelastine (ASTELIN) 274 mcg, Nasal, 2 times daily    COQ10, UBIQUINOL, ORAL Oral    ipratropium (ATROVENT) 42 mcg (0.06 %) nasal spray 2 sprays, Each Nostril, 3 times daily PRN    ketoconazole (NIZORAL) 2  % cream Apply twice a day to affected areas of skin    pimecrolimus (ELIDEL) 1 % cream Topical (Top), 2 times daily    tadalafiL (CIALIS) 5 mg, Oral, Daily PRN    tamsulosin (FLOMAX) 0.4 mg, Oral, Daily       Lipid Panel:   Lab Results   Component Value Date    CHOL 168 05/02/2024    HDL 51 05/02/2024    LDLCALC 106.8 05/02/2024    TRIG 51 05/02/2024    CHOLHDL 30.4 05/02/2024       The 10-year ASCVD risk score (Eliud CARY, et al., 2019) is: 10.3%    Values used to calculate the score:      Age: 63 years      Sex: Male      Is Non- : No      Diabetic: No      Tobacco smoker: No      Systolic Blood Pressure: 128 mmHg      Is BP treated: No      HDL Cholesterol: 51 mg/dL      Total Cholesterol: 168 mg/dL    Most Recent EKG Results  Results for orders placed or performed during the hospital encounter of 04/11/24   EKG 12-lead    Collection Time: 04/11/24  9:49 AM   Result Value Ref Range    QRS Duration 86 ms    [facility] QTC Calculation 399 ms    Narrative    Test Reason : R55,    Vent. Rate : 062 BPM     Atrial Rate : 062 BPM     P-R Int : 146 ms          QRS Dur : 086 ms      QT Int : 394 ms       P-R-T Axes : 053 010 038 degrees     QTc Int : 399 ms    Normal sinus rhythm  Normal ECG  When compared with ECG of 22-MAR-2023 08:32,  No significant change was found  Confirmed by Natalie AVILES, Eddi PEDERSON ([id number]) on 4/11/2024 10:57:35 AM    Referred By:  KOTA           Confirmed By:Eddi Estrada MD       Most Recent Echocardiogram Results  Results for orders placed in visit on 04/14/23    Echo    Interpretation Summary  · The left ventricle is normal in size with normal systolic function.  · The estimated ejection fraction is 60%.  · Normal left ventricular diastolic function.  · Normal right ventricular size with normal right ventricular systolic function.  · Normal central venous pressure (3 mmHg).  · The estimated PA systolic pressure is 19 mmHg.      Most Recent Nuclear Stress Test  Results  No results found for this or any previous visit.      Most Recent Cardiac PET Stress Test Results  No results found for this or any previous visit.      Most Recent Cardiovascular Angiogram results  No results found for this or any previous visit.      Other Most Recent Cardiology Results  Results for orders placed during the hospital encounter of 04/11/24    Cardiac monitoring strips        All pertinent data including labs, imaging, EKGs, and studies listed above were reviewed.  Patient's most recent EKG tracing was personally interpreted by this provider.    Assessment:       1. Mixed hyperlipidemia    2. Encounter for cardiac risk counseling         Plan for treatment of the above diagnoses:     Symptoms OK today  BP/Pulse unable to be assessed on telemedicine visit  Most recent echocardiogram reviewed personally      Continue atorvastatin 40 mg PO Daily  Continue coenzyme Q10    Continue other cardiac medications  Mediterranean Diet/Cardiovascular Exercise Program    Patient queried and all questions were answered.    F/u in 1 year to reassess    The patient location is: [place]   The chief complaint leading to consultation is:  Please see problem list above  Visit type: Virtual visit with synchronous audio and video  Total time spent with patient: 15 minutes   Each patient to whom he or she provides medical services by telemedicine is:  (1) informed of the relationship between the physician and patient and the respective role of any other health care provider with respect to management of the patient; and (2) notified that he or she may decline to receive medical services by telemedicine and may withdraw from such care at any time.    Notes: See above       Signed:    Rafa Pa MD  6/21/2024 7:49 AM

## 2024-06-19 DIAGNOSIS — S92.352A CLOSED DISPLACED FRACTURE OF FIFTH METATARSAL BONE OF LEFT FOOT, INITIAL ENCOUNTER: Primary | ICD-10-CM

## 2024-06-21 ENCOUNTER — OFFICE VISIT (OUTPATIENT)
Dept: CARDIOLOGY | Facility: CLINIC | Age: 64
End: 2024-06-21
Payer: COMMERCIAL

## 2024-06-21 ENCOUNTER — PATIENT MESSAGE (OUTPATIENT)
Dept: CARDIOLOGY | Facility: CLINIC | Age: 64
End: 2024-06-21

## 2024-06-21 DIAGNOSIS — E78.2 MIXED HYPERLIPIDEMIA: Primary | Chronic | ICD-10-CM

## 2024-06-21 DIAGNOSIS — Z71.89 ENCOUNTER FOR CARDIAC RISK COUNSELING: ICD-10-CM

## 2024-06-25 ENCOUNTER — HOSPITAL ENCOUNTER (OUTPATIENT)
Dept: RADIOLOGY | Facility: HOSPITAL | Age: 64
Discharge: HOME | End: 2024-06-25
Attending: ORTHOPAEDIC SURGERY
Payer: COMMERCIAL

## 2024-06-25 ENCOUNTER — OFFICE VISIT (OUTPATIENT)
Dept: ORTHOPEDICS | Facility: CLINIC | Age: 64
End: 2024-06-25
Payer: COMMERCIAL

## 2024-06-25 ENCOUNTER — TELEPHONE (OUTPATIENT)
Dept: ORTHOPEDICS | Facility: CLINIC | Age: 64
End: 2024-06-25
Payer: COMMERCIAL

## 2024-06-25 VITALS — WEIGHT: 207 LBS | HEIGHT: 75 IN | BODY MASS INDEX: 25.74 KG/M2

## 2024-06-25 DIAGNOSIS — M79.605 LEFT LEG PAIN: Primary | ICD-10-CM

## 2024-06-25 DIAGNOSIS — M79.605 LEFT LEG PAIN: ICD-10-CM

## 2024-06-25 DIAGNOSIS — S92.352A CLOSED DISPLACED FRACTURE OF FIFTH METATARSAL BONE OF LEFT FOOT, INITIAL ENCOUNTER: Primary | ICD-10-CM

## 2024-06-25 PROCEDURE — 1160F RVW MEDS BY RX/DR IN RCRD: CPT | Mod: CPTII,S$GLB,, | Performed by: ORTHOPAEDIC SURGERY

## 2024-06-25 PROCEDURE — 3044F HG A1C LEVEL LT 7.0%: CPT | Mod: CPTII,S$GLB,, | Performed by: ORTHOPAEDIC SURGERY

## 2024-06-25 PROCEDURE — 1159F MED LIST DOCD IN RCRD: CPT | Mod: CPTII,S$GLB,, | Performed by: ORTHOPAEDIC SURGERY

## 2024-06-25 PROCEDURE — 93971 EXTREMITY STUDY: CPT | Mod: 26,LT,, | Performed by: RADIOLOGY

## 2024-06-25 PROCEDURE — 73630 X-RAY EXAM OF FOOT: CPT | Mod: 26,LT,, | Performed by: RADIOLOGY

## 2024-06-25 PROCEDURE — 99999 PR PBB SHADOW E&M-EST. PATIENT-LVL III: CPT | Mod: PBBFAC,,, | Performed by: ORTHOPAEDIC SURGERY

## 2024-06-25 PROCEDURE — 93971 EXTREMITY STUDY: CPT | Mod: TC,PO,LT

## 2024-06-25 PROCEDURE — 99213 OFFICE O/P EST LOW 20 MIN: CPT | Mod: S$GLB,,, | Performed by: ORTHOPAEDIC SURGERY

## 2024-06-25 PROCEDURE — 3008F BODY MASS INDEX DOCD: CPT | Mod: CPTII,S$GLB,, | Performed by: ORTHOPAEDIC SURGERY

## 2024-06-25 PROCEDURE — 73630 X-RAY EXAM OF FOOT: CPT | Mod: TC,PO,LT

## 2024-06-25 NOTE — PROGRESS NOTES
Status/Diagnosis: Displaced Left 5th MT diaphyseal fracture.  Date of Surgery: none  Date of Injury: 04/10/2024  Return visit: 1 month  X-rays on Return: WB 3-views Left foot    Chief Complaint:   Left foot pain    Present History:  Patient presents today via referral from No ref. provider found   Osbaldo Brewer is a 63 y.o. male with acute onset left lateral forefoot pain.  Patient actually endorses a syncopal episode that he relates to be dehydrated on 04/10/2024.  Immediate pain and swelling about the left foot.  X-rays taken at the emergency department were consistent with fracture.  He was placed into a splint with instructions for outpatient orthopedic follow-up.  Denies any prior syncopal episodes.  No foot pain or instability prior to the above.    Pain currently 0/10.  Denies any numbness or tingling.    Denies tobacco use.  Works as a .    05/02/2024:  Patient returns today for repeat evaluation and x-ray.  Reports compliance with nonweightbearing status using his short boot and crutches.  Only minimal complaints of pain in regard to the foot.  Patient does have worsening bilateral low back pain with radiating symptoms down into the leg.  No new history of injury or other inciting event.    05/23/2024:  Patient returns today for repeat evaluation and x-ray.  Mild improvement since last being seen.  4/10 pain.  Reports strict compliance with nonweightbearing status.  Wearing his short boot as instructed.  Continues to work full-time as a .    06/25/2024:  Patient returns today for repeat evaluation and x-ray.  Patient has been either nylon or partial weight-bearing since last being seen.  Still in the short cam boot using crutches for ambulation.  Now with new onset left lower leg, calf, ankle swelling.  Increased pain this morning after tripping over his crutches.  Endorses 0/10 pain currently.      Past Medical History:   Diagnosis Date    Basal cell carcinoma     BPH  associated with nocturia 01/11/2023    Erectile dysfunction 01/11/2023    History of Helicobacter pylori infection 01/11/2023    History of sinus surgery 01/11/2023    Mixed hyperlipidemia 08/29/2014       Past Surgical History:   Procedure Laterality Date    FUNCTIONAL ENDOSCOPIC SINUS SURGERY (FESS) USING COMPUTER-ASSISTED NAVIGATION Bilateral 3/31/2023    Procedure: SINUS SURGERY FUNCTIONAL ENDOSCOPIC WITH NAVIGATION;  Surgeon: Neville Navarrete MD;  Location: [facility] OR;  Service: ENT;  Laterality: Bilateral;    NASAL SEPTOPLASTY Bilateral 3/31/2023    Procedure: SEPTOPLASTY;  Surgeon: Neville Navarrete MD;  Location: [facility] OR;  Service: ENT;  Laterality: Bilateral;    SINUS SURGERY      WISDOM TOOTH EXTRACTION      states woke up during procedure       Current Outpatient Medications   Medication Sig    atorvastatin (LIPITOR) 40 MG tablet Take 1 tablet (40 mg total) by mouth once daily.    azelastine (ASTELIN) 137 mcg (0.1 %) nasal spray 2 sprays (274 mcg total) by Nasal route 2 (two) times daily.    COQ10, UBIQUINOL, ORAL Take by mouth.    ipratropium (ATROVENT) 42 mcg (0.06 %) nasal spray 2 sprays by Each Nostril route 3 (three) times daily as needed (runny nose (rhinitis)).    ketoconazole (NIZORAL) 2 % cream Apply twice a day to affected areas of skin    pimecrolimus (ELIDEL) 1 % cream Apply topically 2 (two) times daily.    tadalafiL (CIALIS) 5 MG tablet Take 1 tablet (5 mg total) by mouth daily as needed for Erectile Dysfunction.    tamsulosin (FLOMAX) 0.4 mg Cap Take 1 capsule (0.4 mg total) by mouth once daily.     No current facility-administered medications for this visit.       Review of patient's allergies indicates:   Allergen Reactions    Oxycodone-acetaminophen Rash       No family history on file.    Social History     Socioeconomic History    Marital status:    Tobacco Use    Smoking status: Never    Smokeless tobacco: Never   Substance and Sexual Activity    Alcohol use: Yes     Comment: occ     Drug use: Never     Social Determinants of Health     Physical Activity: Unknown (6/21/2024)    Exercise Vital Sign     Days of Exercise per Week: 0 days     Minutes of Exercise per Session: Patient declined   Stress: Stress Concern Present (6/21/2024)    [employer] - Occupational Stress Questionnaire     Feeling of Stress : To some extent       Physical exam:  There were no vitals filed for this visit.  Body mass index is 25.87 kg/m².  General: In no apparent distress; well developed and well nourished.  HEENT: normocephalic; atraumatic.  Cardiovascular: regular rate.  Respiratory: no increased work of breathing.  Musculoskeletal:   Gait: did not assess  Inspection:   Mild residual swelling about the left lateral forefoot. Some residual tenderness on deep palpation at the distal 5th metatarsal diaphysis.  No pain referable to the ankle.  No laxity with anterior drawer testing.  No ATFL tenderness.  Some pain with resisted ankle dorsiflexion/eversion but with 5/5 strength.  New onset left lower leg swelling with positive Homans sign.  Silfverskiold: Negative  Strength:              Dorsiflexion 5/5  Plantar flexion 5/5  Inversion 5/5  Eversion 5/5  Sensation:              SILT distally  ROM:              Ankle: full and painless              Subtalar: full and painless  Pulses: Palpable pedal pulse                   Imaging Studies/Outside documentation:  I have ordered/reviewed/interpreted the following images/outside documentation:  1. WB 3-views of Left foot:  On my independent review, persistent long spiral fracture of the 5th metatarsal distal diaphysis.  Approximately 80% medial translation of the distal fragment.  4 mm of shortening.  Relatively unchanged.  Sagittal alignment acceptable.    No significant callus formation or bony bridging on repeat imaging today.          Assessment:  Osbaldo Brewer is a 63 y.o. male with Displaced Left 5th MT diaphyseal fracture.      Plan:   Clinical and radiographic findings were discussed at length with the patient and his wife who accompanies him today.    Again note that while alignment is relatively well-maintained there is no significant callus formation or bony bridging on repeat x-ray today.    Patient may bear full weight as pain allows in his short cam boot.    Urgent duplex ultrasound mortise today to rule out DVT.    --Patient notified of results after clinic that test was negative for DVT.  May remove boot for sleep, hygiene, and home ankle exercises only.    Return to clinic in 1 month for repeat evaluation and x-ray.  Patient and family voiced understanding.  All questions were answered.        This note was created using voice recognition software and may contain grammatical errors.

## 2024-07-17 DIAGNOSIS — S92.352A CLOSED DISPLACED FRACTURE OF FIFTH METATARSAL BONE OF LEFT FOOT, INITIAL ENCOUNTER: Primary | ICD-10-CM

## 2024-07-25 ENCOUNTER — OFFICE VISIT (OUTPATIENT)
Dept: ORTHOPEDICS | Facility: CLINIC | Age: 64
End: 2024-07-25
Payer: COMMERCIAL

## 2024-07-25 ENCOUNTER — HOSPITAL ENCOUNTER (OUTPATIENT)
Dept: RADIOLOGY | Facility: HOSPITAL | Age: 64
Discharge: HOME | End: 2024-07-25
Attending: ORTHOPAEDIC SURGERY
Payer: COMMERCIAL

## 2024-07-25 VITALS — BODY MASS INDEX: 25.74 KG/M2 | WEIGHT: 207 LBS | HEIGHT: 75 IN

## 2024-07-25 DIAGNOSIS — S86.312A STRAIN OF MUSCLE(S) AND TENDON(S) OF PERONEAL MUSCLE GROUP AT LOWER LEG LEVEL, LEFT LEG, INITIAL ENCOUNTER: ICD-10-CM

## 2024-07-25 DIAGNOSIS — M25.572 PAIN OF JOINT OF LEFT ANKLE AND FOOT: Primary | ICD-10-CM

## 2024-07-25 DIAGNOSIS — S92.352A CLOSED DISPLACED FRACTURE OF FIFTH METATARSAL BONE OF LEFT FOOT, INITIAL ENCOUNTER: ICD-10-CM

## 2024-07-25 DIAGNOSIS — S92.352A CLOSED DISPLACED FRACTURE OF FIFTH METATARSAL BONE OF LEFT FOOT, INITIAL ENCOUNTER: Primary | ICD-10-CM

## 2024-07-25 PROCEDURE — 3044F HG A1C LEVEL LT 7.0%: CPT | Mod: CPTII,S$GLB,, | Performed by: ORTHOPAEDIC SURGERY

## 2024-07-25 PROCEDURE — 99999 PR PBB SHADOW E&M-EST. PATIENT-LVL III: CPT | Mod: PBBFAC,,, | Performed by: ORTHOPAEDIC SURGERY

## 2024-07-25 PROCEDURE — 1159F MED LIST DOCD IN RCRD: CPT | Mod: CPTII,S$GLB,, | Performed by: ORTHOPAEDIC SURGERY

## 2024-07-25 PROCEDURE — 99213 OFFICE O/P EST LOW 20 MIN: CPT | Mod: S$GLB,,, | Performed by: ORTHOPAEDIC SURGERY

## 2024-07-25 PROCEDURE — 73630 X-RAY EXAM OF FOOT: CPT | Mod: TC,PO,LT

## 2024-07-25 PROCEDURE — 3008F BODY MASS INDEX DOCD: CPT | Mod: CPTII,S$GLB,, | Performed by: ORTHOPAEDIC SURGERY

## 2024-07-25 PROCEDURE — 73630 X-RAY EXAM OF FOOT: CPT | Mod: 26,LT,, | Performed by: RADIOLOGY

## 2024-07-25 NOTE — PROGRESS NOTES
Status/Diagnosis: Displaced Left 5th MT diaphyseal fracture.  Left ankle deltoid injury; peroneal tendon strain.  Date of Surgery: none  Date of Injury: 04/10/2024  Return visit: After MRI  X-rays on Return: WB 3-views Left foot    Chief Complaint:   Left foot pain    Present History:  Patient presents today via referral from No ref. provider found   Osbaldo Brewer is a 63 y.o. male with acute onset left lateral forefoot pain.  Patient actually endorses a syncopal episode that he relates to be dehydrated on 04/10/2024.  Immediate pain and swelling about the left foot.  X-rays taken at the emergency department were consistent with fracture.  He was placed into a splint with instructions for outpatient orthopedic follow-up.  Denies any prior syncopal episodes.  No foot pain or instability prior to the above.    Pain currently 0/10.  Denies any numbness or tingling.    Denies tobacco use.  Works as a .    05/02/2024:  Patient returns today for repeat evaluation and x-ray.  Reports compliance with nonweightbearing status using his short boot and crutches.  Only minimal complaints of pain in regard to the foot.  Patient does have worsening bilateral low back pain with radiating symptoms down into the leg.  No new history of injury or other inciting event.    05/23/2024:  Patient returns today for repeat evaluation and x-ray.  Mild improvement since last being seen.  4/10 pain.  Reports strict compliance with nonweightbearing status.  Wearing his short boot as instructed.  Continues to work full-time as a .    06/25/2024:  Patient returns today for repeat evaluation and x-ray.  Patient has been either nylon or partial weight-bearing since last being seen.  Still in the short cam boot using crutches for ambulation.  Now with new onset left lower leg, calf, ankle swelling.  Increased pain this morning after tripping over his crutches.  Endorses 0/10 pain currently.    07/25/2024:  Patient  returns today for repeat clinical evaluation and x-ray.  Patient is still with moderate persistent pain and swelling involving both the foot and ankle.  Previous duplex ultrasound to rule out left lower extremity DVT.    Patient has been minimally ambulatory.  States that at the end of the day he was severe swelling > pain.  Mostly localized to the 5th metatarsal but also diffusely about the ankle.      Past Medical History:   Diagnosis Date    Basal cell carcinoma     BPH associated with nocturia 01/11/2023    Erectile dysfunction 01/11/2023    History of Helicobacter pylori infection 01/11/2023    History of sinus surgery 01/11/2023    Mixed hyperlipidemia 08/29/2014       Past Surgical History:   Procedure Laterality Date    FUNCTIONAL ENDOSCOPIC SINUS SURGERY (FESS) USING COMPUTER-ASSISTED NAVIGATION Bilateral 3/31/2023    Procedure: SINUS SURGERY FUNCTIONAL ENDOSCOPIC WITH NAVIGATION;  Surgeon: Neville Navarrete MD;  Location: [facility] OR;  Service: ENT;  Laterality: Bilateral;    NASAL SEPTOPLASTY Bilateral 3/31/2023    Procedure: SEPTOPLASTY;  Surgeon: Neville Navarrete MD;  Location: [facility] OR;  Service: ENT;  Laterality: Bilateral;    SINUS SURGERY      WISDOM TOOTH EXTRACTION      states woke up during procedure       Current Outpatient Medications   Medication Sig    atorvastatin (LIPITOR) 40 MG tablet Take 1 tablet (40 mg total) by mouth once daily.    azelastine (ASTELIN) 137 mcg (0.1 %) nasal spray 2 sprays (274 mcg total) by Nasal route 2 (two) times daily.    COQ10, UBIQUINOL, ORAL Take by mouth.    ipratropium (ATROVENT) 42 mcg (0.06 %) nasal spray 2 sprays by Each Nostril route 3 (three) times daily as needed (runny nose (rhinitis)).    ketoconazole (NIZORAL) 2 % cream Apply twice a day to affected areas of skin    pimecrolimus (ELIDEL) 1 % cream Apply topically 2 (two) times daily.    tadalafiL (CIALIS) 5 MG tablet Take 1 tablet (5 mg total) by mouth daily as needed for Erectile Dysfunction.     tamsulosin (FLOMAX) 0.4 mg Cap Take 1 capsule (0.4 mg total) by mouth once daily.     No current facility-administered medications for this visit.       Review of patient's allergies indicates:   Allergen Reactions    Oxycodone-acetaminophen Rash       No family history on file.    Social History     Socioeconomic History    Marital status:    Tobacco Use    Smoking status: Never    Smokeless tobacco: Never   Substance and Sexual Activity    Alcohol use: Yes     Comment: occ    Drug use: Never     Social Determinants of Health     Physical Activity: Unknown (6/21/2024)    Exercise Vital Sign     Days of Exercise per Week: 0 days     Minutes of Exercise per Session: Patient declined   Stress: Stress Concern Present (6/21/2024)    [employer] - Occupational Stress Questionnaire     Feeling of Stress : To some extent       Physical exam:  There were no vitals filed for this visit.  There is no height or weight on file to calculate BMI.  General: In no apparent distress; well developed and well nourished.  HEENT: normocephalic; atraumatic.  Cardiovascular: regular rate.  Respiratory: no increased work of breathing.  Musculoskeletal:   Gait: did not assess  Inspection:   Mild-to-moderate residual swelling about the lateral forefoot with proximal extension of the level of the ankle.  Little to no swelling about the lower leg that was present at the last clinic visit.    Moderate tenderness of the 5th metatarsal shaft fracture site.    Mild-to-moderate tenderness both of the deltoid and along the course of the peroneals spanning from the groove to the tubercle.  Mild ATFL tenderness.  No edson laxity with anterior drawer or varus/valgus talar tilt testing.  Still with some pain with resisted ankle dorsiflexion/eversion but with 5/5 strength.  Silfverskiold: Negative  Strength:              Dorsiflexion 5/5  Plantar flexion 5/5  Inversion 5/5  Eversion 5/5  Sensation:              SILT  distally  ROM:              Ankle: full and painless              Subtalar: full and painless  Pulses: Palpable pedal pulse                   Imaging Studies/Outside documentation:  I have ordered/reviewed/interpreted the following images/outside documentation:  1. WB 3-views of Left foot:  On my independent review, persistent long spiral fracture of the 5th metatarsal distal diaphysis.  Approximately 80% medial translation of the distal fragment.  4 mm of shortening.  Relatively unchanged.  Sagittal alignment acceptable.    Again note no significant callus formation or bony bridging on repeat imaging.          Assessment:  Osbaldo Brewer is a 63 y.o. male with DDisplaced Left 5th MT diaphyseal fracture.  Left ankle deltoid injury; peroneal tendon strain.     Plan:   Clinical and radiographic findings were discussed at length with the patient and his wife who accompanies him today.    Patient with minimal fracture healing now 3.5 months out from injury.  Patient with persistent pain and swelling, more than what would be expected at this time.    Given persistent pain and swelling localized to the ankle, we will obtain MRI without contrast for further evaluation.    Patient may continue to bear full weight in the boot as previously discussed.  Patient informed to let pain be his guide.    Return to clinic after MRI is complete.  Patient and family voiced understanding.  All questions were answered.        This note was created using voice recognition software and may contain grammatical errors.

## 2024-07-31 DIAGNOSIS — S92.352A CLOSED DISPLACED FRACTURE OF FIFTH METATARSAL BONE OF LEFT FOOT, INITIAL ENCOUNTER: Primary | ICD-10-CM

## 2024-08-05 ENCOUNTER — HOSPITAL ENCOUNTER (OUTPATIENT)
Dept: RADIOLOGY | Facility: HOSPITAL | Age: 64
Discharge: HOME | End: 2024-08-05
Attending: ORTHOPAEDIC SURGERY
Payer: COMMERCIAL

## 2024-08-05 DIAGNOSIS — M25.572 PAIN OF JOINT OF LEFT ANKLE AND FOOT: ICD-10-CM

## 2024-08-05 PROCEDURE — 73721 MRI JNT OF LWR EXTRE W/O DYE: CPT | Mod: TC,PO,LT

## 2024-08-05 PROCEDURE — 73721 MRI JNT OF LWR EXTRE W/O DYE: CPT | Mod: 26,LT,, | Performed by: RADIOLOGY

## 2024-08-13 ENCOUNTER — OFFICE VISIT (OUTPATIENT)
Dept: ORTHOPEDICS | Facility: CLINIC | Age: 64
End: 2024-08-13
Payer: COMMERCIAL

## 2024-08-13 ENCOUNTER — HOSPITAL ENCOUNTER (OUTPATIENT)
Dept: RADIOLOGY | Facility: HOSPITAL | Age: 64
Discharge: HOME | End: 2024-08-13
Attending: ORTHOPAEDIC SURGERY
Payer: COMMERCIAL

## 2024-08-13 DIAGNOSIS — S92.352A CLOSED DISPLACED FRACTURE OF FIFTH METATARSAL BONE OF LEFT FOOT, INITIAL ENCOUNTER: Primary | ICD-10-CM

## 2024-08-13 DIAGNOSIS — S92.352A CLOSED DISPLACED FRACTURE OF FIFTH METATARSAL BONE OF LEFT FOOT, INITIAL ENCOUNTER: ICD-10-CM

## 2024-08-13 PROCEDURE — 1159F MED LIST DOCD IN RCRD: CPT | Mod: CPTII,S$GLB,, | Performed by: ORTHOPAEDIC SURGERY

## 2024-08-13 PROCEDURE — 1160F RVW MEDS BY RX/DR IN RCRD: CPT | Mod: CPTII,S$GLB,, | Performed by: ORTHOPAEDIC SURGERY

## 2024-08-13 PROCEDURE — 3044F HG A1C LEVEL LT 7.0%: CPT | Mod: CPTII,S$GLB,, | Performed by: ORTHOPAEDIC SURGERY

## 2024-08-13 PROCEDURE — 99214 OFFICE O/P EST MOD 30 MIN: CPT | Mod: S$GLB,,, | Performed by: ORTHOPAEDIC SURGERY

## 2024-08-13 PROCEDURE — 73630 X-RAY EXAM OF FOOT: CPT | Mod: 26,LT,, | Performed by: RADIOLOGY

## 2024-08-13 PROCEDURE — 99999 PR PBB SHADOW E&M-EST. PATIENT-LVL II: CPT | Mod: PBBFAC,,, | Performed by: ORTHOPAEDIC SURGERY

## 2024-08-13 PROCEDURE — 73630 X-RAY EXAM OF FOOT: CPT | Mod: TC,PO,LT

## 2024-08-15 NOTE — PROGRESS NOTES
Status/Diagnosis: Displaced Left 5th MT diaphyseal fracture.  Date of Surgery: none  Date of Injury: 04/10/2024  Return visit: 6 weeks (or sooner if needed)  X-rays on Return: WB 3-views Left foot    Chief Complaint:   Left foot pain    Present History:  Patient presents today via referral from No ref. provider found   Osbaldo Brewer is a 63 y.o. male with acute onset left lateral forefoot pain.  Patient actually endorses a syncopal episode that he relates to be dehydrated on 04/10/2024.  Immediate pain and swelling about the left foot.  X-rays taken at the emergency department were consistent with fracture.  He was placed into a splint with instructions for outpatient orthopedic follow-up.  Denies any prior syncopal episodes.  No foot pain or instability prior to the above.    Pain currently 0/10.  Denies any numbness or tingling.    Denies tobacco use.  Works as a .    05/02/2024:  Patient returns today for repeat evaluation and x-ray.  Reports compliance with nonweightbearing status using his short boot and crutches.  Only minimal complaints of pain in regard to the foot.  Patient does have worsening bilateral low back pain with radiating symptoms down into the leg.  No new history of injury or other inciting event.    05/23/2024:  Patient returns today for repeat evaluation and x-ray.  Mild improvement since last being seen.  4/10 pain.  Reports strict compliance with nonweightbearing status.  Wearing his short boot as instructed.  Continues to work full-time as a .    06/25/2024:  Patient returns today for repeat evaluation and x-ray.  Patient has been either nylon or partial weight-bearing since last being seen.  Still in the short cam boot using crutches for ambulation.  Now with new onset left lower leg, calf, ankle swelling.  Increased pain this morning after tripping over his crutches.  Endorses 0/10 pain currently.    07/25/2024:  Patient returns today for repeat clinical  evaluation and x-ray.  Patient is still with moderate persistent pain and swelling involving both the foot and ankle.  Previous duplex ultrasound to rule out left lower extremity DVT.    Patient has been minimally ambulatory.  States that at the end of the day he was severe swelling > pain.  Mostly localized to the 5th metatarsal but also diffusely about the ankle.    08/13/2024:  Patient returns today for repeat clinical evaluation and MRI results.  Moderate improvement in regard to previously noted ankle swelling however patient attributes this to keeping this elevated most of the time over the last several days.  Again note swelling all seems to be gravity dependent.      Past Medical History:   Diagnosis Date    Basal cell carcinoma     BPH associated with nocturia 01/11/2023    Erectile dysfunction 01/11/2023    History of Helicobacter pylori infection 01/11/2023    History of sinus surgery 01/11/2023    Mixed hyperlipidemia 08/29/2014       Past Surgical History:   Procedure Laterality Date    FUNCTIONAL ENDOSCOPIC SINUS SURGERY (FESS) USING COMPUTER-ASSISTED NAVIGATION Bilateral 3/31/2023    Procedure: SINUS SURGERY FUNCTIONAL ENDOSCOPIC WITH NAVIGATION;  Surgeon: Neville Navarrete MD;  Location: [facility] OR;  Service: ENT;  Laterality: Bilateral;    NASAL SEPTOPLASTY Bilateral 3/31/2023    Procedure: SEPTOPLASTY;  Surgeon: Neville Navarrete MD;  Location: [facility] OR;  Service: ENT;  Laterality: Bilateral;    SINUS SURGERY      WISDOM TOOTH EXTRACTION      states woke up during procedure       Current Outpatient Medications   Medication Sig    atorvastatin (LIPITOR) 40 MG tablet Take 1 tablet (40 mg total) by mouth once daily.    azelastine (ASTELIN) 137 mcg (0.1 %) nasal spray 2 sprays (274 mcg total) by Nasal route 2 (two) times daily.    COQ10, UBIQUINOL, ORAL Take by mouth.    ipratropium (ATROVENT) 42 mcg (0.06 %) nasal spray 2 sprays by Each Nostril route 3 (three) times daily as needed (runny nose  (rhinitis)).    ketoconazole (NIZORAL) 2 % cream Apply twice a day to affected areas of skin    pimecrolimus (ELIDEL) 1 % cream Apply topically 2 (two) times daily.    tadalafiL (CIALIS) 5 MG tablet Take 1 tablet (5 mg total) by mouth daily as needed for Erectile Dysfunction.    tamsulosin (FLOMAX) 0.4 mg Cap Take 1 capsule (0.4 mg total) by mouth once daily.     No current facility-administered medications for this visit.       Review of patient's allergies indicates:   Allergen Reactions    Oxycodone-acetaminophen Rash       No family history on file.    Social History     Socioeconomic History    Marital status:    Tobacco Use    Smoking status: Never    Smokeless tobacco: Never   Substance and Sexual Activity    Alcohol use: Yes     Comment: occ    Drug use: Never     Social Determinants of Health     Physical Activity: Unknown (6/21/2024)    Exercise Vital Sign     Days of Exercise per Week: 0 days     Minutes of Exercise per Session: Patient declined   Stress: Stress Concern Present (6/21/2024)    [employer] - Occupational Stress Questionnaire     Feeling of Stress : To some extent       Physical exam:  There were no vitals filed for this visit.  There is no height or weight on file to calculate BMI.  General: In no apparent distress; well developed and well nourished.  HEENT: normocephalic; atraumatic.  Cardiovascular: regular rate.  Respiratory: no increased work of breathing.  Musculoskeletal:   Gait: did not assess  Inspection:   Mild residual swelling about the ankle with distal extension but overall improved versus last clinic visit.  Mild tenderness of the 5th metatarsal shaft fracture site.  Minimal tenderness over the deltoid and ATFL.  No edson laxity with anterior drawer or varus/valgus talar tilt testing.  Minimal pain with resisted ankle dorsiflexion/eversion but with 5/5 strength.  Silfverskiold: Negative  Strength:              Dorsiflexion 5/5  Plantar  flexion 5/5  Inversion 5/5  Eversion 5/5  Sensation:              SILT distally  ROM:              Ankle: full and painless              Subtalar: full and painless  Pulses: Palpable pedal pulse                   Imaging Studies/Outside documentation:  I have ordered/reviewed/interpreted the following images/outside documentation:  1. WB 3-views of Left foot:  On my independent review, persistent long spiral fracture of the 5th metatarsal distal diaphysis.  Approximately 80% medial translation of the distal fragment.  4 mm of shortening.  Relatively unchanged.  Sagittal alignment acceptable.    Again note no significant callus formation or bony bridging on repeat imaging.    2. MRI Left ankle w/o contrast on 08/05/2024:  On my independent review, previously noted subacute left 5th metatarsal diaphyseal fracture.  Patient has fairly diffuse subcutaneous edema throughout the ankle with distal extension.  No evidence of osteochondral lesion, ligament/tendon tear.        Assessment:  Osbaldo Brewer is a 63 y.o. male with DDisplaced Left 5th MT diaphyseal fracture.  Left ankle deltoid injury; peroneal tendon strain.     Plan:   Clinical and radiographic findings were discussed at length with the patient and his wife who accompanies him today.    No evidence of structural damage on advanced imaging.  These were reviewed in detail with the patient today.  Patient was now 4 months out from injury with minimal signs of healing at the 5th metatarsal shaft fracture site.  Again operative versus nonoperative treatment options were discussed.  Patient is leaning towards proceeding with surgical intervention however would like to wait till either November 20, 2024 or possibly January 20, 2025.    Patient may continue to bear full weight in his boot as pain allows.    No clear etiology for gravity dependent left lower leg and ankle swelling.  This has never been present on the contralateral extremity.    Patient voiced  understanding.  All questions were answered.  Return to clinic in 6 weeks for repeat evaluation and x-ray, or sooner if needed.        This note was created using voice recognition software and may contain grammatical errors.

## 2024-08-28 ENCOUNTER — OFFICE VISIT (OUTPATIENT)
Facility: CLINIC | Age: 64
End: 2024-08-28
Payer: COMMERCIAL

## 2024-08-28 DIAGNOSIS — Z85.828 HISTORY OF NONMELANOMA SKIN CANCER: ICD-10-CM

## 2024-08-28 DIAGNOSIS — L82.0 INFLAMED SEBORRHEIC KERATOSIS: ICD-10-CM

## 2024-08-28 DIAGNOSIS — L82.1 SK (SEBORRHEIC KERATOSIS): ICD-10-CM

## 2024-08-28 DIAGNOSIS — L57.0 AK (ACTINIC KERATOSIS): Primary | ICD-10-CM

## 2024-08-28 DIAGNOSIS — Z12.83 SCREENING FOR SKIN CANCER: ICD-10-CM

## 2024-08-28 DIAGNOSIS — D22.9 MULTIPLE BENIGN NEVI: ICD-10-CM

## 2024-08-28 DIAGNOSIS — L81.4 LENTIGINES: ICD-10-CM

## 2024-08-28 DIAGNOSIS — D18.01 CHERRY ANGIOMA: ICD-10-CM

## 2024-08-28 PROCEDURE — 3044F HG A1C LEVEL LT 7.0%: CPT | Mod: CPTII,S$GLB,, | Performed by: DERMATOLOGY

## 2024-08-28 PROCEDURE — 17000 DESTRUCT PREMALG LESION: CPT | Mod: S$GLB,,, | Performed by: DERMATOLOGY

## 2024-08-28 PROCEDURE — 99203 OFFICE O/P NEW LOW 30 MIN: CPT | Mod: 25,S$GLB,, | Performed by: DERMATOLOGY

## 2024-08-28 PROCEDURE — 1159F MED LIST DOCD IN RCRD: CPT | Mod: CPTII,S$GLB,, | Performed by: DERMATOLOGY

## 2024-08-28 PROCEDURE — 99999 PR PBB SHADOW E&M-EST. PATIENT-LVL II: CPT | Mod: PBBFAC,,, | Performed by: DERMATOLOGY

## 2024-08-28 PROCEDURE — 17003 DESTRUCT PREMALG LES 2-14: CPT | Mod: S$GLB,,, | Performed by: DERMATOLOGY

## 2024-08-28 NOTE — PROGRESS NOTES
Subjective:      Patient ID:  Osbaldo Brewer is a 63 y.o. male who presents for   Chief Complaint   Patient presents with    Skin Check     TBSC     HPI    New patient.  Here today for total body skin exam.   Hx of NMSC.   C/o few spots at chest, R arm.   No further complaints today.     +NMSC  BCC at R chest, R arm    Review of Systems    Objective:   Physical Exam   Constitutional: He appears well-developed and well-nourished. He is cooperative.   HENT:   Head: Normocephalic and atraumatic.   Eyes: Lids are normal. Lids are normal.  Right conjunctiva is not injected. Left conjunctiva is not injected. No conjunctival no injection.   Pulmonary/Chest: No respiratory distress.   Musculoskeletal:      Right lower leg: No edema.      Left lower leg: No edema.   Neurological: He is alert and oriented to person, place, and time.   Psychiatric: He has a normal mood and affect. His speech is normal and behavior is normal. Mood, affect, judgment and thought content normal.   Skin:   Areas Examined (abnormalities noted in diagram):   Scalp / Hair Palpated and Inspected  Head / Face Inspection Performed  Neck Inspection Performed  Chest / Axilla Inspection Performed  Abdomen Inspection Performed  Genitals / Buttocks / Groin Inspection Performed  Back Inspection Performed  RUE Inspected  LUE Inspection Performed  RLE Inspected  LLE Inspection Performed  Nails and Digits Inspection Performed                 Diagram Legend     Erythematous scaling macule/papule c/w actinic keratosis       Vascular papule c/w angioma      Pigmented verrucoid papule/plaque c/w seborrheic keratosis      Yellow umbilicated papule c/w sebaceous hyperplasia      Irregularly shaped tan macule c/w lentigo     1-2 mm smooth white papules consistent with Milia      Movable subcutaneous cyst with punctum c/w epidermal inclusion cyst      Subcutaneous movable cyst c/w pilar cyst      Firm pink to brown papule c/w dermatofibroma      Pedunculated  fleshy papule(s) c/w skin tag(s)      Evenly pigmented macule c/w junctional nevus     Mildly variegated pigmented, slightly irregular-bordered macule c/w mildly atypical nevus      Flesh colored to evenly pigmented papule c/w intradermal nevus       Pink pearly papule/plaque c/w basal cell carcinoma      Erythematous hyperkeratotic cursted plaque c/w SCC      Surgical scar with no sign of skin cancer recurrence      Open and closed comedones      Inflammatory papules and pustules      Verrucoid papule consistent consistent with wart     Erythematous eczematous patches and plaques     Dystrophic onycholytic nail with subungual debris c/w onychomycosis     Umbilicated papule    Erythematous-base heme-crusted tan verrucoid plaque consistent with inflamed seborrheic keratosis     Erythematous Silvery Scaling Plaque c/w Psoriasis     See annotation      Assessment / Plan:        AK (actinic keratosis)  - Discussed diagnosis, etiology, and precancerous nature of condition.   - Cryosurgery Procedure Note: Discussed procedure with patient/patient's guardian including risks and benefits as well as treatment alternatives. Risks of procedure include pain, itching, swelling, redness, blistering, crusting, wound formation, post-inflammatory pigmentary alteration, scar, recurrence. Verbal consent obtained. LN2 cryosurgery performed to 11 lesion(s). Patient tolerated procedure well. After-visit wound care instructions reviewed and provided in writing.      Inflamed seborrheic keratosis  - Discussed diagnosis, etiology, and treatment options.   - Cryosurgery Procedure Note: Discussed procedure with patient/patient's guardian including risks and benefits as well as treatment alternatives. Risks of procedure include pain, itching, swelling, redness, blistering, crusting, wound formation, post-inflammatory pigmentary alteration, scar, recurrence. Verbal consent obtained. LN2 cryosurgery performed to 1 lesion(s). Patient tolerated  procedure well. After-visit wound care instructions reviewed and provided in writing.     Multiple benign nevi  Lentigines  - Discussed diagnosis, etiology, and benign-nature of condition.  - Reassured; no lesions suspicious for malignancy noted on exam today.   - Recommended routine self examination of skin. Discussed the ABCDEs of melanoma and ugly duckling sign.   - Recommended daily sun protection, including the use of OTC broad-spectrum sunscreen (SPF 30 or greater) and sun-protective clothing.      SK (seborrheic keratosis)  Cherry angioma  - Benign; reassured treatment not necessary.     History of nonmelanoma skin cancer  Screening for skin cancer  - Total body skin examination performed today.  - Findings listed above.   - Recommended routine self examination of skin.    - Recommended daily sun protection, including the use of OTC broad-spectrum sunscreen (SPF 30 or greater) and sun-protective clothing.            Follow up in about 6 months (around 2/28/2025) for aks, sooner PRN.

## 2024-09-04 ENCOUNTER — TELEPHONE (OUTPATIENT)
Facility: CLINIC | Age: 64
End: 2024-09-04
Payer: COMMERCIAL

## 2024-09-04 NOTE — PATIENT INSTRUCTIONS
CRYOSURGERY      Your doctor has used a method called cryosurgery to treat your skin condition. Cryosurgery refers to the use of very cold substances to treat a variety of skin conditions such as warts, pre-skin cancers, molluscum contagiosum, sun spots, and several benign growths. The substance we use in cryosurgery is liquid nitrogen and is so cold (-195 degrees Celsius) that is burns when administered.     Following treatment in the office, the skin may immediately burn and become red. You may find the area around the lesion is affected as well. It is sometimes necessary to treat not only the lesion, but a small area of the surrounding normal skin to achieve a good response.     A blister, and even a blood filled blister, may form after treatment.   This is a normal response. If the blister is painful, it is acceptable to sterilize a needle and with rubbing alcohol and gently pop the blister. It is important that you gently wash the area with soap and warm water as the blister fluid may contain wart virus if a wart was treated. Do no remove the roof of the blister.     The area treated can take anywhere from 1-3 weeks to heal. Healing time depends on the kind skin lesion treated, the location, and how aggressively the lesion was treated. It is recommended that the areas treated are covered with Vaseline or bacitracin ointment and a band-aid. If a band-aid is not practical, just ointment applied several times per day will do. Keeping these areas moist will speed the healing time.    Treatment with liquid nitrogen can leave a scar. In dark skin, it may be a light or dark scar, in light skin it may be a white or pink scar. These will generally fade with time, but may never go away completely.     If you have any concerns after your treatment, please feel free to call the office.       [address] [facility] [place] [address]/ [phone] FAX/ [email or web address] What Are the Symptoms of Skin  Cancer?  A change in your skin is the most common sign of skin cancer. This could be a new growth, a sore that doesnt heal, or a change in a mole. Not all skin cancers look the same.    For melanoma specifically, a simple way to remember the warning signs is to remember the A-B-C-D-Es of melanoma--    A stands for asymmetrical. Does the mole or spot have an irregular shape with two parts that look very different?  B stands for border. Is the border irregular or jagged?  C is for color. Is the color uneven?  D is for diameter. Is the mole or spot larger than the size of a pea?  E is for evolving. Has the mole or spot changed during the past few weeks or months?    Talk to your doctor if you notice changes in your skin such as a new growth, a sore that doesnt heal, a change in an old growth, or any of the A-B-C-D-Es of melanoma    What Can I Do to Reduce My Risk of Skin Cancer?  Protection from ultraviolet (UV) radiation is important all year, not just during the summer or at the beach. UV rays from the sun can reach you on cloudy and hazy days, not just on bright and barbara days. UV rays also reflect off of surfaces like water, cement, sand, and snow. Indoor tanning (using a tanning bed, soriano, or sunlamp to get tan) exposes users to UV radiation.    The hours between 10 a.m. and 4 p.m. Daylight Saving Time (9 a.m. to 3 p.m. standard time) are the most hazardous for UV exposure outdoors in the continental [place]. UV rays from sunlight are the greatest during the late spring and early summer in [place].    CDC recommends easy options for protection from UV radiation--    Stay in the shade or indoors, especially during midday hours.  Wear clothing that covers your arms and legs.  Wear a hat with a wide brim to shade your face, head, ears, and neck.  Wear sunglasses that wrap around and block both UVA and UVB rays.  Use sunscreen with a sun protection factor (SPF) of 30 or higher, and both UVA  and UVB (broad spectrum) protection.  Avoid indoor tanning.    Adapted from https://www.cdc.gov/cancer/skin/basic_info/

## 2024-09-16 ENCOUNTER — TELEPHONE (OUTPATIENT)
Dept: FAMILY MEDICINE | Facility: CLINIC | Age: 64
End: 2024-09-16
Payer: COMMERCIAL

## 2024-09-16 NOTE — TELEPHONE ENCOUNTER
----- Message from Jenn Patel sent at 9/16/2024  7:33 AM CDT -----  Contact: wife  Type:  Needs Medical Advice    Who Called: wife Mariana  Symptoms (please be specific): lost consciousness twice yesterday, wife had to call 911 and wife is concerned and would like him to be seen asap.     Would the patient rather a call back or a response via [facility]? call  Best Call Back Number: [phone]    Additional Information: please advise and thank you

## 2024-09-18 ENCOUNTER — OFFICE VISIT (OUTPATIENT)
Dept: FAMILY MEDICINE | Facility: CLINIC | Age: 64
End: 2024-09-18
Payer: COMMERCIAL

## 2024-09-18 ENCOUNTER — LAB VISIT (OUTPATIENT)
Dept: LAB | Facility: HOSPITAL | Age: 64
End: 2024-09-18
Attending: OTOLARYNGOLOGY
Payer: COMMERCIAL

## 2024-09-18 ENCOUNTER — OFFICE VISIT (OUTPATIENT)
Dept: OTOLARYNGOLOGY | Facility: CLINIC | Age: 64
End: 2024-09-18
Payer: COMMERCIAL

## 2024-09-18 VITALS
TEMPERATURE: 98 F | WEIGHT: 211.19 LBS | BODY MASS INDEX: 26.4 KG/M2 | DIASTOLIC BLOOD PRESSURE: 82 MMHG | HEART RATE: 78 BPM | SYSTOLIC BLOOD PRESSURE: 126 MMHG | OXYGEN SATURATION: 98 %

## 2024-09-18 VITALS — WEIGHT: 207 LBS | BODY MASS INDEX: 25.87 KG/M2

## 2024-09-18 DIAGNOSIS — D14.0 INVERTED PAPILLOMA OF LATERAL NASAL WALL: ICD-10-CM

## 2024-09-18 DIAGNOSIS — K21.9 LARYNGOPHARYNGEAL REFLUX (LPR): ICD-10-CM

## 2024-09-18 DIAGNOSIS — R55 SYNCOPE, UNSPECIFIED SYNCOPE TYPE: ICD-10-CM

## 2024-09-18 DIAGNOSIS — R55 SYNCOPE, UNSPECIFIED SYNCOPE TYPE: Primary | ICD-10-CM

## 2024-09-18 DIAGNOSIS — R09.82 POST-NASAL DRIP: ICD-10-CM

## 2024-09-18 LAB
ALBUMIN SERPL BCP-MCNC: 4.5 G/DL (ref 3.5–5.2)
ALP SERPL-CCNC: 69 U/L (ref 55–135)
ALT SERPL W/O P-5'-P-CCNC: 24 U/L (ref 10–44)
ANION GAP SERPL CALC-SCNC: 9 MMOL/L (ref 8–16)
AST SERPL-CCNC: 23 U/L (ref 10–40)
BASOPHILS # BLD AUTO: 0.04 K/UL (ref 0–0.2)
BASOPHILS NFR BLD: 0.5 % (ref 0–1.9)
BILIRUB SERPL-MCNC: 0.5 MG/DL (ref 0.1–1)
BUN SERPL-MCNC: 15 MG/DL (ref 8–23)
CALCIUM SERPL-MCNC: 10.6 MG/DL (ref 8.7–10.5)
CHLORIDE SERPL-SCNC: 106 MMOL/L (ref 95–110)
CO2 SERPL-SCNC: 26 MMOL/L (ref 23–29)
CREAT SERPL-MCNC: 0.9 MG/DL (ref 0.5–1.4)
DIFFERENTIAL METHOD BLD: NORMAL
EOSINOPHIL # BLD AUTO: 0.2 K/UL (ref 0–0.5)
EOSINOPHIL NFR BLD: 1.8 % (ref 0–8)
ERYTHROCYTE [DISTWIDTH] IN BLOOD BY AUTOMATED COUNT: 13.2 % (ref 11.5–14.5)
EST. GFR  (NO RACE VARIABLE): >60 ML/MIN/1.73 M^2
GLUCOSE SERPL-MCNC: 86 MG/DL (ref 70–110)
HCT VFR BLD AUTO: 48.4 % (ref 40–54)
HGB BLD-MCNC: 15.8 G/DL (ref 14–18)
IMM GRANULOCYTES # BLD AUTO: 0.02 K/UL (ref 0–0.04)
IMM GRANULOCYTES NFR BLD AUTO: 0.2 % (ref 0–0.5)
LYMPHOCYTES # BLD AUTO: 2.2 K/UL (ref 1–4.8)
LYMPHOCYTES NFR BLD: 26.9 % (ref 18–48)
MCH RBC QN AUTO: 30 PG (ref 27–31)
MCHC RBC AUTO-ENTMCNC: 32.6 G/DL (ref 32–36)
MCV RBC AUTO: 92 FL (ref 82–98)
MONOCYTES # BLD AUTO: 0.7 K/UL (ref 0.3–1)
MONOCYTES NFR BLD: 9 % (ref 4–15)
NEUTROPHILS # BLD AUTO: 5.1 K/UL (ref 1.8–7.7)
NEUTROPHILS NFR BLD: 61.6 % (ref 38–73)
NRBC BLD-RTO: 0 /100 WBC
OHS QRS DURATION: 86 MS
OHS QTC CALCULATION: 420 MS
PLATELET # BLD AUTO: 236 K/UL (ref 150–450)
PMV BLD AUTO: 10.8 FL (ref 9.2–12.9)
POTASSIUM SERPL-SCNC: 4.5 MMOL/L (ref 3.5–5.1)
PROT SERPL-MCNC: 8.1 G/DL (ref 6–8.4)
RBC # BLD AUTO: 5.26 M/UL (ref 4.6–6.2)
SODIUM SERPL-SCNC: 141 MMOL/L (ref 136–145)
WBC # BLD AUTO: 8.24 K/UL (ref 3.9–12.7)

## 2024-09-18 PROCEDURE — 1159F MED LIST DOCD IN RCRD: CPT | Mod: CPTII,S$GLB,, | Performed by: OTOLARYNGOLOGY

## 2024-09-18 PROCEDURE — 99999 PR PBB SHADOW E&M-EST. PATIENT-LVL IV: CPT | Mod: PBBFAC,,, | Performed by: NURSE PRACTITIONER

## 2024-09-18 PROCEDURE — 3008F BODY MASS INDEX DOCD: CPT | Mod: CPTII,S$GLB,, | Performed by: OTOLARYNGOLOGY

## 2024-09-18 PROCEDURE — 99999 PR PBB SHADOW E&M-EST. PATIENT-LVL III: CPT | Mod: PBBFAC,,, | Performed by: OTOLARYNGOLOGY

## 2024-09-18 PROCEDURE — 80053 COMPREHEN METABOLIC PANEL: CPT | Performed by: OTOLARYNGOLOGY

## 2024-09-18 PROCEDURE — 36415 COLL VENOUS BLD VENIPUNCTURE: CPT | Mod: PO | Performed by: OTOLARYNGOLOGY

## 2024-09-18 PROCEDURE — 93010 ELECTROCARDIOGRAM REPORT: CPT | Mod: S$GLB,,, | Performed by: INTERNAL MEDICINE

## 2024-09-18 PROCEDURE — 99214 OFFICE O/P EST MOD 30 MIN: CPT | Mod: 25,S$GLB,, | Performed by: OTOLARYNGOLOGY

## 2024-09-18 PROCEDURE — 3044F HG A1C LEVEL LT 7.0%: CPT | Mod: CPTII,S$GLB,, | Performed by: OTOLARYNGOLOGY

## 2024-09-18 PROCEDURE — 31231 NASAL ENDOSCOPY DX: CPT | Mod: S$GLB,,, | Performed by: OTOLARYNGOLOGY

## 2024-09-18 PROCEDURE — 1160F RVW MEDS BY RX/DR IN RCRD: CPT | Mod: CPTII,S$GLB,, | Performed by: OTOLARYNGOLOGY

## 2024-09-18 PROCEDURE — 85025 COMPLETE CBC W/AUTO DIFF WBC: CPT | Performed by: OTOLARYNGOLOGY

## 2024-09-18 RX ORDER — FAMOTIDINE 40 MG/1
40 TABLET, FILM COATED ORAL 2 TIMES DAILY
Qty: 30 TABLET | Refills: 11 | Status: SHIPPED | OUTPATIENT
Start: 2024-09-18 | End: 2025-09-18

## 2024-09-18 NOTE — PROGRESS NOTES
Subjective:       Patient ID: Osbaldo Brewer is a 63 y.o. male.    Chief Complaint: Sinus Problem    Osbaldo is here for follow-up of AR and R IP of maxillary sinus.  He continues to have bothersome complaints of thick post nasal drainage / globus sensation. Present mainly in am - has used Astelin then we tried 0.06 atrovent without much change. Maybe the Atrovent helped dry up a bit but then caused sore throat.   Did have history of sig reflux - took meds yrs ago but hasn't had sig indigestion recently. Thought he DOES note at night maybe a little bit of reflux when turning over.   He passed out again a few days ago. Felt a bit tired leading up to it / felt nauseated. No CP or SOB. Had a E-visit with Cards back when this happened in Spring. Neg w/u and attributed to dehydration. Has been more diligent with hydration and doesn't feel overly worked / dehydrated. Very scary this time happening again. Did not go to ER.   Saw Joel SCHMIDT neg.    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   Also with AR, using Astelin.  Had a recent sinusitis, Rx Augmentin. Dx on imaging obtained for syncopal episode where he hit a counter and broke his foot.  Using saline, Astelin regularly doesn't notice how much improvement he has with his rhinitis    Patient validated questionnaires (if applicable):      %            No data to display                   No data to display                   No data to display                     Review of Systems   Constitutional: Negative for activity change and appetite change.   Respiratory: Negative for difficulty breathing and wheezing   Cardiovascular: Negative for chest pain.      Objective:        Constitutional:   Vital signs are normal. He appears well-developed and well-nourished.     Head:  Normocephalic and atraumatic.     Ears:  Hearing normal to normal and whispered voice; external ear normal without scars, lesions, or masses; ear canal,  tympanic membrane, and middle ear normal..     Nose:  Nose normal including turbinates, nasal mucosa, sinuses and nasal septum.   Nasal endoscopy indicated to evaluate symptoms.    Mouth/Throat  Oropharynx clear and moist without lesions or asymmetry.     Neck:  Neck normal without thyromegaly masses, asymmetry, normal tracheal structure, crepitus, and tenderness.         Tests / Results:  Name: Osbaldo Brewer     Pre-procedure diagnosis: Syncope, unspecified syncope type [R55]  Post-procedure diagnosis: Same    Procedure: Bilateral nasal endoscopy  Anesthesia:  4% Lidocaine and 0.25% Phenylephrine Topical    Indication: This procedure is indicated as anterior rhinoscopy is not sufficient to account for all of the patients symptoms.     Procedure: Risks, benefits, and alternatives of the procedure were discussed with the patient, and consent was obtained to perform a nasal endoscopy.  The nasal cavity was sprayed with a topical decongestant and topical anesthetic. After adequate anesthesia was obtained, the scope was passed into each nostril independently.  The nasal cavities (including inferior turbinates, middle turbinates, inferior meatus, middle meatus, superior meatus) nasopharynx, choana, eustachian tube, fossa of Rosenmüller, and adenoids were examined. All findings were normal with exception of description below. At the end of the examination, the scope was removed. The patient tolerated the procedure well with no complications.     LEFT: no issues  RIGHT:  Patent maxillary antrostomy with no residual edema. No evidence of IP recurrence. No purulence.   There is mild adenoiditis changes and thickened saliva in the nasopharynx - unclear significance.    Assessment:       1. Syncope, unspecified syncope type    2. Post-nasal drip    3. Laryngopharyngeal reflux (LPR)            Plan:       Has potentially reflux rhinitis change (given largely negative response to some AR meds.)  No infection. Continued  to discuss PND may or may not be easily treatable though LPR sounds likely.   Has improved hydration.  Will start Pepcid nightly and titrate to BID after a month if no response. Holding off on PPI out of precaution with his recent syncope.     Had another episode of unexplainable syncope. I discussed that this is not related to the sinus. Needs to get back in with Cardiology to evaluate whether additional testing warranted now that this has happened twice in 6 months. I can order vascular imaging for neck and brain but would clear heart first. Will reach out to cardiology team.

## 2024-09-18 NOTE — PROGRESS NOTES
Subjective:       Patient ID: Osbaldo Brewer is a 63 y.o. male.    Chief Complaint: Fell in April (Passed out in April and broke left leg and hit head. Since then has passed out as recent as Sunday. Is aware of episode and states when comes through he's nauseated. Had lab work drawn this morning. Denies hx of hypertension.)    HPI  New patient to me presents for syncopal episode    4/11/24 had syncopal episode. Evaluated at [facility] ED--workup including negative EKG, labs and CXR. CT head noted chronic sinus disease. Xray left foot noted displaced 5th metatarsal diaphysis oblique fracture. Prescribed Augmentin x 7 days, DCd home with ortho FU.     He has been following with Dr. Ramirez. 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 Started to feel nauseous, sat down. presyncope, started sweating, passed out. In and out of consciousness for some time, took 2 hours for full resolution. Did not get evlauated. He denies CP, palpitations, extremity pain, numbness, tingling.     Chronic sinusitis s/p sinus surgery 3/2023 +pseudomonas. Saw immunologist Dr. Alvarez. Saw Dr. Navarrete 5/7/24 at which time sinus symptoms resolved. Saw again today --started pepcid for reflux rhinitis. Reported syncopal  event--recommended cardiology evaluation. CBC and CMP collected this morning. TSH WNL 5/2/24    Patient follows with Dr. Pa for hyperlipidemia--controlled on Lipitor.     BPH on flomax.     No new or change in medications, activity or lifestyle     Vitals:    09/18/24 1307   BP: 126/82   Pulse: 78   Temp: 97.7 °F (36.5 °C)     Review of Systems   Constitutional:  Negative for fever.   Respiratory:  Negative for cough and shortness of breath.    Cardiovascular:  Negative for chest pain and palpitations.   Neurological:  Positive for syncope.       Past Medical History:   Diagnosis Date    Basal cell carcinoma     BPH associated with nocturia 01/11/2023    Erectile  dysfunction 01/11/2023    History of Helicobacter pylori infection 01/11/2023    History of sinus surgery 01/11/2023    Mixed hyperlipidemia 08/29/2014     Objective:      Physical Exam  Vitals and nursing note reviewed.   Constitutional:       General: He is not in acute distress.     Appearance: He is not diaphoretic.   HENT:      Head: Normocephalic.   Eyes:      General: Lids are normal.         Right eye: No discharge.         Left eye: No discharge.   Neck:      Trachea: No tracheal deviation.   Cardiovascular:      Rate and Rhythm: Normal rate and regular rhythm.      Heart sounds: Normal heart sounds.   Pulmonary:      Effort: Pulmonary effort is normal.      Breath sounds: Normal breath sounds.   Musculoskeletal:      Right lower leg: No edema.      Left lower leg: No edema.   Skin:     Coloration: Skin is not pale.   Neurological:      Mental Status: He is alert and oriented to person, place, and time.   Psychiatric:         Speech: Speech normal.         Behavior: Behavior normal.         Thought Content: Thought content normal.         Judgment: Judgment normal.         Assessment:       1. Syncope, unspecified syncope type        Plan:       Syncope, unspecified syncope type  -     Echo; Future  -     EKG 12-lead; Future  -     Holter monitor - 48 hour; Future      Labs this morning in process  EKG unremarkable  Workup as above  Cardiology eval   To ER for recurrent symptoms     Medication List with Changes/Refills   Current Medications    ATORVASTATIN (LIPITOR) 40 MG TABLET    Take 1 tablet (40 mg total) by mouth once daily.    AZELASTINE (ASTELIN) 137 MCG (0.1 %) NASAL SPRAY    2 sprays (274 mcg total) by Nasal route 2 (two) times daily.    COQ10, UBIQUINOL, ORAL    Take by mouth.    FAMOTIDINE (PEPCID) 40 MG TABLET    Take 1 tablet (40 mg total) by mouth 2 (two) times daily.    IPRATROPIUM (ATROVENT) 42 MCG (0.06 %) NASAL SPRAY    2 sprays by Each Nostril route 3 (three) times daily as needed (runny  nose (rhinitis)).    KETOCONAZOLE (NIZORAL) 2 % CREAM    Apply twice a day to affected areas of skin    PIMECROLIMUS (ELIDEL) 1 % CREAM    Apply topically 2 (two) times daily.    TADALAFIL (CIALIS) 5 MG TABLET    Take 1 tablet (5 mg total) by mouth daily as needed for Erectile Dysfunction.    TAMSULOSIN (FLOMAX) 0.4 MG CAP    Take 1 capsule (0.4 mg total) by mouth once daily.

## 2024-09-18 NOTE — Clinical Note
This patient had an unexplainable syncopal episode back in April - seen in ER and sounds like a basic w/u was negative. Thought it was maybe dehydration. Happened again this week - less thought that it is dehydration based on his recurrence and story - told him he should follow-up with guille to discuss whether any additional testing warranted. Please reach out for an appt with him. He's a bit nervous about the recurrence.

## 2024-09-19 ENCOUNTER — TELEPHONE (OUTPATIENT)
Dept: FAMILY MEDICINE | Facility: CLINIC | Age: 64
End: 2024-09-19
Payer: COMMERCIAL

## 2024-09-19 ENCOUNTER — TELEPHONE (OUTPATIENT)
Dept: OTOLARYNGOLOGY | Facility: CLINIC | Age: 64
End: 2024-09-19
Payer: COMMERCIAL

## 2024-09-19 ENCOUNTER — PATIENT MESSAGE (OUTPATIENT)
Dept: FAMILY MEDICINE | Facility: CLINIC | Age: 64
End: 2024-09-19
Payer: COMMERCIAL

## 2024-09-19 DIAGNOSIS — Z01.30 ENCOUNTER FOR EXAMINATION OF BLOOD PRESSURE WITHOUT ABNORMAL FINDINGS: ICD-10-CM

## 2024-09-19 DIAGNOSIS — Z71.89 ENCOUNTER FOR CARDIAC RISK COUNSELING: ICD-10-CM

## 2024-09-19 DIAGNOSIS — E78.2 MIXED HYPERLIPIDEMIA: Primary | Chronic | ICD-10-CM

## 2024-09-19 DIAGNOSIS — F41.9 ANXIETY: ICD-10-CM

## 2024-09-19 NOTE — TELEPHONE ENCOUNTER
----- Message from Shin Patterson sent at 9/18/2024  4:16 PM CDT -----  Regarding: advice  Type:  Needs Medical Advice    Who Called: wife     Best Call Back Number: [phone]      Additional Information: wife wants a call back from a nurse st she has some questions from visit today.  please call to discuss.

## 2024-09-19 NOTE — TELEPHONE ENCOUNTER
----- Message from Neville Navarrete MD sent at 9/19/2024 11:40 AM CDT -----  Osbaldo,  Labs are pretty normal. Calcium can be sometimes be a little up and down, but this wouldn't account for your symptoms as far as I'm concerns.     Neville Navarrete MD  Otolaryngology - Head and Neck Surgery  Office: [phone]  Cell: [phone]  Fax: [phone]    This message was generated using voice dictation. Please excuse any errors that may have been created by the transcription software.

## 2024-09-19 NOTE — TELEPHONE ENCOUNTER
----- Message from Shin Patterson sent at 9/18/2024  4:16 PM CDT -----  Regarding: advice  Type:  Needs Medical Advice    Who Called: wife     Best Call Back Number: [phone]      Additional Information: wife wants a call back from a nurse st she has some questions from visit today.  please call to discuss.

## 2024-09-23 DIAGNOSIS — M79.672 LEFT FOOT PAIN: Primary | ICD-10-CM

## 2024-09-24 ENCOUNTER — HOSPITAL ENCOUNTER (OUTPATIENT)
Dept: RADIOLOGY | Facility: HOSPITAL | Age: 64
Discharge: HOME | End: 2024-09-24
Attending: ORTHOPAEDIC SURGERY
Payer: COMMERCIAL

## 2024-09-24 ENCOUNTER — OFFICE VISIT (OUTPATIENT)
Dept: ORTHOPEDICS | Facility: CLINIC | Age: 64
End: 2024-09-24
Payer: COMMERCIAL

## 2024-09-24 ENCOUNTER — HOSPITAL ENCOUNTER (OUTPATIENT)
Dept: CARDIOLOGY | Facility: HOSPITAL | Age: 64
Discharge: HOME | End: 2024-09-24
Attending: NURSE PRACTITIONER
Payer: COMMERCIAL

## 2024-09-24 VITALS — HEIGHT: 75 IN | WEIGHT: 211.19 LBS | BODY MASS INDEX: 26.26 KG/M2

## 2024-09-24 DIAGNOSIS — M79.672 LEFT FOOT PAIN: ICD-10-CM

## 2024-09-24 DIAGNOSIS — S92.352A CLOSED DISPLACED FRACTURE OF FIFTH METATARSAL BONE OF LEFT FOOT, INITIAL ENCOUNTER: Primary | ICD-10-CM

## 2024-09-24 DIAGNOSIS — R55 SYNCOPE, UNSPECIFIED SYNCOPE TYPE: ICD-10-CM

## 2024-09-24 PROCEDURE — 1159F MED LIST DOCD IN RCRD: CPT | Mod: CPTII,S$GLB,, | Performed by: ORTHOPAEDIC SURGERY

## 2024-09-24 PROCEDURE — 73630 X-RAY EXAM OF FOOT: CPT | Mod: TC,PO,LT

## 2024-09-24 PROCEDURE — 73630 X-RAY EXAM OF FOOT: CPT | Mod: 26,LT,, | Performed by: RADIOLOGY

## 2024-09-24 PROCEDURE — 99999 PR PBB SHADOW E&M-EST. PATIENT-LVL III: CPT | Mod: PBBFAC,,, | Performed by: ORTHOPAEDIC SURGERY

## 2024-09-24 PROCEDURE — 99213 OFFICE O/P EST LOW 20 MIN: CPT | Mod: S$GLB,,, | Performed by: ORTHOPAEDIC SURGERY

## 2024-09-24 PROCEDURE — 3008F BODY MASS INDEX DOCD: CPT | Mod: CPTII,S$GLB,, | Performed by: ORTHOPAEDIC SURGERY

## 2024-09-24 PROCEDURE — 3044F HG A1C LEVEL LT 7.0%: CPT | Mod: CPTII,S$GLB,, | Performed by: ORTHOPAEDIC SURGERY

## 2024-09-24 PROCEDURE — 1160F RVW MEDS BY RX/DR IN RCRD: CPT | Mod: CPTII,S$GLB,, | Performed by: ORTHOPAEDIC SURGERY

## 2024-09-24 PROCEDURE — 93225 XTRNL ECG REC<48 HRS REC: CPT | Mod: PO

## 2024-09-24 PROCEDURE — 93227 XTRNL ECG REC<48 HR R&I: CPT | Mod: ,,, | Performed by: INTERNAL MEDICINE

## 2024-09-24 NOTE — PROGRESS NOTES
Subjective:    Patient ID:  Osbaldo Brewer is a 63 y.o. male who presents for follow-up of No chief complaint on file.      TELEMEDICINE VISIT      Problem List Items Addressed This Visit          Cardiac/Vascular    Mixed hyperlipidemia - Primary (Chronic)     Other Visit Diagnoses       Encounter for cardiac risk counseling                HPI    Patient was last seen on 06/21/2024 at which time he was doing okay from a cardiac standpoint.  Was having some recent issues with falling, carotid Doppler and monitor ordered for evaluation.    Patient presents for telemedicine visit.    On assessment today, the patient states that he has been having issues with falls. Blacked out April 10th and broke his foot. No chest pain, just felt like room was closing in on him.   Has been having sweating at night as well  Recent fatigue.    Had a very recent episode when all of a sudden he was not feeling good. Issues lasted for some time. Was blacking in and out. Was in [place].  Checked BP when he wasn't feeling well around an episode and was in the 120s systolic, states normal range for the machine, pulse 64.    Monitor pending.  No chest pain.    Recent echo OK  Monitor pending  Carotid doppler pending  Had angiogram 2018, no stents placed, but non-obstructive CAD noted    Not taking Floxmax  Not taking Cialis  Drinking a lot of water since April 10th, episodes still occurring  CT head done April 11th, no acute abnormalities       Objective:   There were no vitals filed for this visit.    BP Readings from Last 5 Encounters:   09/18/24 126/82   04/22/24 128/76   04/11/24 (!) 146/83   10/16/23 114/70   06/20/23 117/78        Physical Exam  Constitutional:       General: He is not in acute distress.     Appearance: He is well-developed. He is not diaphoretic.   HENT:      Head: Normocephalic and atraumatic.   Eyes:      General: No scleral icterus.     Conjunctiva/sclera: Conjunctivae normal.   Pulmonary:      Effort:  No respiratory distress.      Breath sounds: No stridor.   Musculoskeletal:         General: No signs of injury.      Cervical back: Normal range of motion.   Skin:     Coloration: Skin is not jaundiced.   Neurological:      Mental Status: He is alert. Mental status is at baseline.   Psychiatric:         Mood and Affect: Mood normal.         Behavior: Behavior normal.             Current Outpatient Medications   Medication Instructions    atorvastatin (LIPITOR) 40 mg, Oral, Daily    azelastine (ASTELIN) 274 mcg, Nasal, 2 times daily    COQ10, UBIQUINOL, ORAL Oral    famotidine (PEPCID) 40 mg, Oral, 2 times daily    ipratropium (ATROVENT) 42 mcg (0.06 %) nasal spray 2 sprays, Each Nostril, 3 times daily PRN    ketoconazole (NIZORAL) 2 % cream Apply twice a day to affected areas of skin    pimecrolimus (ELIDEL) 1 % cream Topical (Top), 2 times daily    tadalafiL (CIALIS) 5 mg, Oral, Daily PRN    tamsulosin (FLOMAX) 0.4 mg, Oral, Daily       Lipid Panel:   Lab Results   Component Value Date    CHOL 168 05/02/2024    HDL 51 05/02/2024    LDLCALC 106.8 05/02/2024    TRIG 51 05/02/2024    CHOLHDL 30.4 05/02/2024       The 10-year ASCVD risk score (Eliud CARY, et al., 2019) is: 10%    Values used to calculate the score:      Age: 63 years      Sex: Male      Is Non- : No      Diabetic: No      Tobacco smoker: No      Systolic Blood Pressure: 126 mmHg      Is BP treated: No      HDL Cholesterol: 51 mg/dL      Total Cholesterol: 168 mg/dL    Most Recent EKG Results  Results for orders placed or performed in visit on 09/18/24   EKG 12-lead    Collection Time: 09/18/24  1:38 PM   Result Value Ref Range    QRS Duration 86 ms    [facility] QTC Calculation 420 ms    Narrative    Test Reason : R55,    Vent. Rate : 069 BPM     Atrial Rate : 069 BPM     P-R Int : 140 ms          QRS Dur : 086 ms      QT Int : 392 ms       P-R-T Axes : 043 -07 057 degrees     QTc Int : 420 ms    Normal sinus rhythm  Normal ECG  When  compared with ECG of 11-APR-2024 09:49,  No significant change was found  Confirmed by Christiana Crespo MD ([id number]) on 9/18/2024 3:35:56 PM    Referred By: JUDI HERBERT           Confirmed By:Christiana Crespo MD       Most Recent Echocardiogram Results  Results for orders placed in visit on 04/14/23    Echo    Left Ventricle: The left ventricle is normal in size. Normal wall thickness. There is concentric remodeling. There is normal systolic function. Ejection fraction by visual approximation is 65%. There is normal diastolic function.    Right Ventricle: Normal right ventricular cavity size. Wall thickness is normal. Systolic function is normal.    Aortic Valve: The aortic valve is a trileaflet valve.    Pulmonary Artery: The estimated pulmonary artery systolic pressure is 20 mmHg.    IVC/SVC: Normal venous pressure at 3 mmHg.      Most Recent Nuclear Stress Test Results  No results found for this or any previous visit.      Most Recent Cardiac PET Stress Test Results  No results found for this or any previous visit.      Most Recent Cardiovascular Angiogram results  No results found for this or any previous visit.      Other Most Recent Cardiology Results  Results for orders placed during the hospital encounter of 04/11/24    Cardiac monitoring strips        All pertinent data including labs, imaging, EKGs, and studies listed above were reviewed.  Patient's most recent EKG tracing was personally interpreted by this provider.    Assessment:       1. Mixed hyperlipidemia    2. Encounter for cardiac risk counseling         Plan for treatment of the above diagnoses:     Symptoms of episodes of syncope  BP/Pulse unable to be assessed on telemedicine visit  Most recent echocardiogram reviewed personally      Continue atorvastatin 40 mg PO Daily   Continue coenzyme Q10   Nuclear stress test, cannot exercise because of broken foot   Will follow-up carotid Doppler and event monitor   If event monitor negative may consider ILR      Continue other cardiac medications  Mediterranean Diet/Cardiovascular Exercise Program    Visit today included increased complexity associated with the care of the episodic problem(s) addressed above in addition to managing the longitudinal care of the patient due to the serious and/or complex managed problem(s) listed above.    Patient queried and all questions were answered.    F/u in 3 months to reassess    The patient location is: [place]   The chief complaint leading to consultation is:  Please see problem list above  Visit type: Virtual visit with synchronous audio and video  Total time spent with patient: 15 minutes   Each patient to whom he or she provides medical services by telemedicine is:  (1) informed of the relationship between the physician and patient and the respective role of any other health care provider with respect to management of the patient; and (2) notified that he or she may decline to receive medical services by telemedicine and may withdraw from such care at any time.    Notes: See above       Signed:    Rafa Pa MD  9/27/2024 3:51 PM

## 2024-09-24 NOTE — PROGRESS NOTES
Status/Diagnosis: Displaced Left 5th MT diaphyseal fracture.  Date of Surgery: none  Date of Injury: 04/10/2024  Return visit: November 2024 (or sooner if needed)  X-rays on Return: WB 3-views Left foot    Chief Complaint:   Left foot pain    Present History:  Patient presents today via referral from No ref. provider found   Osbaldo Brewer is a 63 y.o. male with acute onset left lateral forefoot pain.  Patient actually endorses a syncopal episode that he relates to be dehydrated on 04/10/2024.  Immediate pain and swelling about the left foot.  X-rays taken at the emergency department were consistent with fracture.  He was placed into a splint with instructions for outpatient orthopedic follow-up.  Denies any prior syncopal episodes.  No foot pain or instability prior to the above.    Pain currently 0/10.  Denies any numbness or tingling.    Denies tobacco use.  Works as a .    05/02/2024:  Patient returns today for repeat evaluation and x-ray.  Reports compliance with nonweightbearing status using his short boot and crutches.  Only minimal complaints of pain in regard to the foot.  Patient does have worsening bilateral low back pain with radiating symptoms down into the leg.  No new history of injury or other inciting event.    05/23/2024:  Patient returns today for repeat evaluation and x-ray.  Mild improvement since last being seen.  4/10 pain.  Reports strict compliance with nonweightbearing status.  Wearing his short boot as instructed.  Continues to work full-time as a .    06/25/2024:  Patient returns today for repeat evaluation and x-ray.  Patient has been either nylon or partial weight-bearing since last being seen.  Still in the short cam boot using crutches for ambulation.  Now with new onset left lower leg, calf, ankle swelling.  Increased pain this morning after tripping over his crutches.  Endorses 0/10 pain currently.    07/25/2024:  Patient returns today for repeat  clinical evaluation and x-ray.  Patient is still with moderate persistent pain and swelling involving both the foot and ankle.  Previous duplex ultrasound to rule out left lower extremity DVT.    Patient has been minimally ambulatory.  States that at the end of the day he was severe swelling > pain.  Mostly localized to the 5th metatarsal but also diffusely about the ankle.    08/13/2024:  Patient returns today for repeat clinical evaluation and MRI results.  Moderate improvement in regard to previously noted ankle swelling however patient attributes this to keeping this elevated most of the time over the last several days.  Again note swelling all seems to be gravity dependent.    09/24/2024:  Patient returns today for repeat clinical evaluation.  Since last being seen he was had multiple syncopal episodes.  Currently with Holter monitor in place.  Still partial weight-bearing using the short cam boot and crutches.  No clear etiology for syncopal episode at this time.      Past Medical History:   Diagnosis Date    Basal cell carcinoma     BPH associated with nocturia 01/11/2023    Erectile dysfunction 01/11/2023    History of Helicobacter pylori infection 01/11/2023    History of sinus surgery 01/11/2023    Mixed hyperlipidemia 08/29/2014       Past Surgical History:   Procedure Laterality Date    FUNCTIONAL ENDOSCOPIC SINUS SURGERY (FESS) USING COMPUTER-ASSISTED NAVIGATION Bilateral 3/31/2023    Procedure: SINUS SURGERY FUNCTIONAL ENDOSCOPIC WITH NAVIGATION;  Surgeon: Neville Navarrete MD;  Location: [facility] OR;  Service: ENT;  Laterality: Bilateral;    NASAL SEPTOPLASTY Bilateral 3/31/2023    Procedure: SEPTOPLASTY;  Surgeon: Neville Navarrete MD;  Location: [facility] OR;  Service: ENT;  Laterality: Bilateral;    SINUS SURGERY      WISDOM TOOTH EXTRACTION      states woke up during procedure       Current Outpatient Medications   Medication Sig    atorvastatin (LIPITOR) 40 MG tablet Take 1 tablet (40 mg total) by mouth  once daily.    azelastine (ASTELIN) 137 mcg (0.1 %) nasal spray 2 sprays (274 mcg total) by Nasal route 2 (two) times daily.    COQ10, UBIQUINOL, ORAL Take by mouth.    famotidine (PEPCID) 40 MG tablet Take 1 tablet (40 mg total) by mouth 2 (two) times daily.    ipratropium (ATROVENT) 42 mcg (0.06 %) nasal spray 2 sprays by Each Nostril route 3 (three) times daily as needed (runny nose (rhinitis)).    ketoconazole (NIZORAL) 2 % cream Apply twice a day to affected areas of skin    pimecrolimus (ELIDEL) 1 % cream Apply topically 2 (two) times daily.    tadalafiL (CIALIS) 5 MG tablet Take 1 tablet (5 mg total) by mouth daily as needed for Erectile Dysfunction.    tamsulosin (FLOMAX) 0.4 mg Cap Take 1 capsule (0.4 mg total) by mouth once daily.     No current facility-administered medications for this visit.       Review of patient's allergies indicates:   Allergen Reactions    Oxycodone-acetaminophen Rash       No family history on file.    Social History     Socioeconomic History    Marital status:    Tobacco Use    Smoking status: Never    Smokeless tobacco: Never   Substance and Sexual Activity    Alcohol use: Yes     Comment: occ    Drug use: Never     Social Determinants of Health     Physical Activity: Unknown (6/21/2024)    Exercise Vital Sign     Days of Exercise per Week: 0 days     Minutes of Exercise per Session: Patient declined   Stress: Stress Concern Present (6/21/2024)    [employer] - Occupational Stress Questionnaire     Feeling of Stress : To some extent       Physical exam:  There were no vitals filed for this visit.  Body mass index is 26.4 kg/m².  General: In no apparent distress; well developed and well nourished.  HEENT: normocephalic; atraumatic.  Cardiovascular: regular rate.  Respiratory: no increased work of breathing.  Musculoskeletal:   Gait: did not assess  Inspection:   Minimal residual swelling about the ankle with distal extension but overall  swelling is significantly improved.  Mild tenderness of the 5th metatarsal shaft fracture site.  Minimal tenderness over the deltoid and ATFL.  No edson laxity with anterior drawer or varus/valgus talar tilt testing.  Minimal pain with resisted ankle dorsiflexion/eversion but with 5/5 strength.  Silfverskiold: Negative  Strength:              Dorsiflexion 5/5  Plantar flexion 5/5  Inversion 5/5  Eversion 5/5  Sensation:              SILT distally  ROM:              Ankle: full and painless              Subtalar: full and painless  Pulses: Palpable pedal pulse                   Imaging Studies/Outside documentation:  I have ordered/reviewed/interpreted the following images/outside documentation:  1. WB 3-views of Left foot:  On my independent review, persistent long spiral fracture of the 5th metatarsal distal diaphysis.  Approximately 80% medial translation of the distal fragment.  4 mm of shortening.  Relatively unchanged.  Sagittal alignment acceptable.    Again note no significant callus formation or bony bridging on repeat imaging.    2. MRI Left ankle w/o contrast on 08/05/2024:  On my independent review, previously noted subacute left 5th metatarsal diaphyseal fracture.  Patient has fairly diffuse subcutaneous edema throughout the ankle with distal extension.  No evidence of osteochondral lesion, ligament/tendon tear.        Assessment:  Osbaldo Brewer is a 63 y.o. male with Displaced Left 5th MT diaphyseal fracture.  Left ankle deltoid injury; peroneal tendon strain.     Plan:   Clinical and radiographic findings were discussed at length with the patient and his wife who accompanies him today.    No interval fracture healing of the 5th metatarsal shaft fracture site today.    Patient is still interested in undergoing surgery but leaning towards December 2024 after his Medicare kicks in.    I reiterated that primary concern is determining the etiology of his multiple syncopal episodes that have  occurred in the last several weeks.    Patient may continue to bear weight in his boot as pain allows.    Patient voiced understanding.  All questions were answered.  Return to clinic in November for preoperative planning, or sooner if needed.      This note was created using voice recognition software and may contain grammatical errors.

## 2024-09-26 ENCOUNTER — HOSPITAL ENCOUNTER (OUTPATIENT)
Dept: CARDIOLOGY | Facility: HOSPITAL | Age: 64
Discharge: HOME | End: 2024-09-26
Attending: NURSE PRACTITIONER
Payer: COMMERCIAL

## 2024-09-26 VITALS — HEIGHT: 75 IN | WEIGHT: 211 LBS | BODY MASS INDEX: 26.24 KG/M2

## 2024-09-26 DIAGNOSIS — R55 SYNCOPE, UNSPECIFIED SYNCOPE TYPE: ICD-10-CM

## 2024-09-26 LAB
ASCENDING AORTA: 3.1 CM
AV INDEX (PROSTH): 0.89
AV MEAN GRADIENT: 2 MMHG
AV PEAK GRADIENT: 5 MMHG
AV VALVE AREA BY VELOCITY RATIO: 3.51 CM²
AV VALVE AREA: 3.86 CM²
AV VELOCITY RATIO: 0.81
BSA FOR ECHO PROCEDURE: 2.25 M2
CV ECHO LV RWT: 0.46 CM
DOP CALC AO PEAK VEL: 1.1 M/S
DOP CALC AO VTI: 21 CM
DOP CALC LVOT AREA: 4.3 CM2
DOP CALC LVOT DIAMETER: 2.35 CM
DOP CALC LVOT PEAK VEL: 0.89 M/S
DOP CALC LVOT STROKE VOLUME: 81.07 CM3
DOP CALCLVOT PEAK VEL VTI: 18.7 CM
E WAVE DECELERATION TIME: 243.43 MSEC
E/A RATIO: 0.91
E/E' RATIO: 6.42 M/S
ECHO LV POSTERIOR WALL: 1.12 CM (ref 0.6–1.1)
EJECTION FRACTION: 65 %
FRACTIONAL SHORTENING: 46 % (ref 28–44)
INTERVENTRICULAR SEPTUM: 1.01 CM (ref 0.6–1.1)
IVRT: 97.05 MSEC
LEFT ATRIUM AREA SYSTOLIC (APICAL 2 CHAMBER): 15.18 CM2
LEFT ATRIUM AREA SYSTOLIC (APICAL 4 CHAMBER): 14.44 CM2
LEFT ATRIUM SIZE: 4.17 CM
LEFT ATRIUM VOLUME INDEX MOD: 16.5 ML/M2
LEFT ATRIUM VOLUME MOD: 36.89 ML
LEFT INTERNAL DIMENSION IN SYSTOLE: 2.63 CM (ref 2.1–4)
LEFT VENTRICLE DIASTOLIC VOLUME INDEX: 49.97 ML/M2
LEFT VENTRICLE DIASTOLIC VOLUME: 111.93 ML
LEFT VENTRICLE END SYSTOLIC VOLUME APICAL 2 CHAMBER: 38.17 ML
LEFT VENTRICLE END SYSTOLIC VOLUME APICAL 4 CHAMBER: 34.38 ML
LEFT VENTRICLE MASS INDEX: 85 G/M2
LEFT VENTRICLE SYSTOLIC VOLUME INDEX: 11.3 ML/M2
LEFT VENTRICLE SYSTOLIC VOLUME: 25.25 ML
LEFT VENTRICULAR INTERNAL DIMENSION IN DIASTOLE: 4.88 CM (ref 3.5–6)
LEFT VENTRICULAR MASS: 190.51 G
LV LATERAL E/E' RATIO: 5.55 M/S
LV SEPTAL E/E' RATIO: 7.63 M/S
LVED V (TEICH): 111.93 ML
LVES V (TEICH): 25.25 ML
LVOT MG: 1.33 MMHG
LVOT MV: 0.53 CM/S
MV PEAK A VEL: 0.67 M/S
MV PEAK E VEL: 0.61 M/S
MV STENOSIS PRESSURE HALF TIME: 70.59 MS
MV VALVE AREA P 1/2 METHOD: 3.12 CM2
OHS CV RV/LV RATIO: 0.85 CM
PISA TR MAX VEL: 2.08 M/S
PULM VEIN S/D RATIO: 1.34
PV PEAK D VEL: 0.53 M/S
PV PEAK S VEL: 0.71 M/S
RA PRESSURE ESTIMATED: 3 MMHG
RIGHT VENTRICLE DIASTOLIC LENGTH: 6.6 CM
RIGHT VENTRICLE DIASTOLIC MID DIMENSION: 2.2 CM
RIGHT VENTRICULAR END-DIASTOLIC DIMENSION: 4.16 CM
RIGHT VENTRICULAR LENGTH IN DIASTOLE (APICAL 4-CHAMBER VIEW): 6.6 CM
RV MID DIAMA: 2.15 CM
RV TB RVSP: 5 MMHG
RV TISSUE DOPPLER FREE WALL SYSTOLIC VELOCITY 1 (APICAL 4 CHAMBER VIEW): 14.83 CM/S
SINUS: 3.04 CM
STJ: 2.7 CM
TDI LATERAL: 0.11 M/S
TDI SEPTAL: 0.08 M/S
TDI: 0.1 M/S
TR MAX PG: 17 MMHG
TRICUSPID ANNULAR PLANE SYSTOLIC EXCURSION: 1.95 CM
TV REST PULMONARY ARTERY PRESSURE: 20 MMHG
Z-SCORE OF LEFT VENTRICULAR DIMENSION IN END DIASTOLE: -4.96
Z-SCORE OF LEFT VENTRICULAR DIMENSION IN END SYSTOLE: -4.85

## 2024-09-26 PROCEDURE — 93306 TTE W/DOPPLER COMPLETE: CPT | Mod: PO

## 2024-09-26 PROCEDURE — 93306 TTE W/DOPPLER COMPLETE: CPT | Mod: 26,,, | Performed by: INTERNAL MEDICINE

## 2024-09-27 ENCOUNTER — PATIENT MESSAGE (OUTPATIENT)
Dept: CARDIOLOGY | Facility: CLINIC | Age: 64
End: 2024-09-27

## 2024-09-27 ENCOUNTER — OFFICE VISIT (OUTPATIENT)
Dept: CARDIOLOGY | Facility: CLINIC | Age: 64
End: 2024-09-27
Payer: COMMERCIAL

## 2024-09-27 DIAGNOSIS — E78.2 MIXED HYPERLIPIDEMIA: Primary | Chronic | ICD-10-CM

## 2024-09-27 DIAGNOSIS — R07.89 ATYPICAL CHEST PAIN: ICD-10-CM

## 2024-09-27 DIAGNOSIS — Z71.89 ENCOUNTER FOR CARDIAC RISK COUNSELING: ICD-10-CM

## 2024-09-27 LAB
OHS CV EVENT MONITOR DAY: 0
OHS CV HOLTER LENGTH DECIMAL HOURS: 48
OHS CV HOLTER LENGTH HOURS: 48
OHS CV HOLTER LENGTH MINUTES: 0
OHS CV HOLTER SINUS AVERAGE HR: 70
OHS CV HOLTER SINUS MAX HR: 129
OHS CV HOLTER SINUS MIN HR: 48

## 2024-10-07 ENCOUNTER — HOSPITAL ENCOUNTER (OUTPATIENT)
Dept: CARDIOLOGY | Facility: HOSPITAL | Age: 64
Discharge: HOME | End: 2024-10-07
Attending: INTERNAL MEDICINE
Payer: COMMERCIAL

## 2024-10-07 DIAGNOSIS — Z01.30 ENCOUNTER FOR EXAMINATION OF BLOOD PRESSURE WITHOUT ABNORMAL FINDINGS: ICD-10-CM

## 2024-10-07 DIAGNOSIS — E78.2 MIXED HYPERLIPIDEMIA: Chronic | ICD-10-CM

## 2024-10-07 DIAGNOSIS — Z71.89 ENCOUNTER FOR CARDIAC RISK COUNSELING: ICD-10-CM

## 2024-10-07 DIAGNOSIS — F41.9 ANXIETY: ICD-10-CM

## 2024-10-07 LAB
LEFT ARM DIASTOLIC BLOOD PRESSURE: 82 MMHG
LEFT ARM SYSTOLIC BLOOD PRESSURE: 126 MMHG
LEFT CBA DIAS: 28 CM/S
LEFT CBA SYS: 110 CM/S
LEFT CCA DIST DIAS: 28 CM/S
LEFT CCA DIST SYS: 99 CM/S
LEFT CCA MID DIAS: 26 CM/S
LEFT CCA MID SYS: 109 CM/S
LEFT CCA PROX DIAS: 23 CM/S
LEFT CCA PROX SYS: 107 CM/S
LEFT ECA DIAS: 18 CM/S
LEFT ECA SYS: 95 CM/S
LEFT ICA DIST DIAS: 39 CM/S
LEFT ICA DIST SYS: 89 CM/S
LEFT ICA MID DIAS: 28 CM/S
LEFT ICA MID SYS: 82 CM/S
LEFT ICA PROX DIAS: 13 CM/S
LEFT ICA PROX SYS: 64 CM/S
LEFT VERTEBRAL DIAS: 15 CM/S
LEFT VERTEBRAL SYS: 60 CM/S
OHS CV CAROTID RIGHT ICA EDV HIGHEST: 28
OHS CV CAROTID ULTRASOUND LEFT ICA/CCA RATIO: 0.9
OHS CV CAROTID ULTRASOUND RIGHT ICA/CCA RATIO: 1.11
OHS CV PV CAROTID LEFT HIGHEST CCA: 109
OHS CV PV CAROTID LEFT HIGHEST ICA: 89
OHS CV PV CAROTID RIGHT HIGHEST CCA: 137
OHS CV PV CAROTID RIGHT HIGHEST ICA: 82
OHS CV US CAROTID LEFT HIGHEST EDV: 39
RIGHT ARM DIASTOLIC BLOOD PRESSURE: 82 MMHG
RIGHT ARM SYSTOLIC BLOOD PRESSURE: 126 MMHG
RIGHT CBA DIAS: 18 CM/S
RIGHT CBA SYS: 71 CM/S
RIGHT CCA DIST DIAS: 20 CM/S
RIGHT CCA DIST SYS: 74 CM/S
RIGHT CCA MID DIAS: 18 CM/S
RIGHT CCA MID SYS: 84 CM/S
RIGHT CCA PROX DIAS: 23 CM/S
RIGHT CCA PROX SYS: 137 CM/S
RIGHT ECA DIAS: 13 CM/S
RIGHT ECA SYS: 88 CM/S
RIGHT ICA DIST DIAS: 28 CM/S
RIGHT ICA DIST SYS: 76 CM/S
RIGHT ICA MID DIAS: 26 CM/S
RIGHT ICA MID SYS: 82 CM/S
RIGHT ICA PROX DIAS: 20 CM/S
RIGHT ICA PROX SYS: 65 CM/S
RIGHT VERTEBRAL DIAS: 11 CM/S
RIGHT VERTEBRAL SYS: 47 CM/S

## 2024-10-07 PROCEDURE — 93880 EXTRACRANIAL BILAT STUDY: CPT | Mod: 26,,, | Performed by: INTERNAL MEDICINE

## 2024-10-07 PROCEDURE — 93880 EXTRACRANIAL BILAT STUDY: CPT | Mod: PO

## 2024-10-08 ENCOUNTER — PATIENT MESSAGE (OUTPATIENT)
Dept: CARDIOLOGY | Facility: CLINIC | Age: 64
End: 2024-10-08
Payer: COMMERCIAL

## 2024-10-08 ENCOUNTER — PATIENT MESSAGE (OUTPATIENT)
Dept: FAMILY MEDICINE | Facility: CLINIC | Age: 64
End: 2024-10-08
Payer: COMMERCIAL

## 2024-10-10 ENCOUNTER — PATIENT MESSAGE (OUTPATIENT)
Dept: CARDIOLOGY | Facility: HOSPITAL | Age: 64
End: 2024-10-10
Payer: COMMERCIAL

## 2024-10-14 ENCOUNTER — HOSPITAL ENCOUNTER (OUTPATIENT)
Dept: RADIOLOGY | Facility: HOSPITAL | Age: 64
Discharge: HOME | End: 2024-10-14
Attending: INTERNAL MEDICINE
Payer: COMMERCIAL

## 2024-10-14 ENCOUNTER — HOSPITAL ENCOUNTER (OUTPATIENT)
Dept: CARDIOLOGY | Facility: HOSPITAL | Age: 64
Discharge: HOME | End: 2024-10-14
Attending: INTERNAL MEDICINE
Payer: COMMERCIAL

## 2024-10-14 VITALS — BODY MASS INDEX: 26.24 KG/M2 | WEIGHT: 211 LBS | HEIGHT: 75 IN

## 2024-10-14 DIAGNOSIS — Z01.30 ENCOUNTER FOR EXAMINATION OF BLOOD PRESSURE WITHOUT ABNORMAL FINDINGS: ICD-10-CM

## 2024-10-14 DIAGNOSIS — Z71.89 ENCOUNTER FOR CARDIAC RISK COUNSELING: ICD-10-CM

## 2024-10-14 DIAGNOSIS — F41.9 ANXIETY: ICD-10-CM

## 2024-10-14 DIAGNOSIS — E78.2 MIXED HYPERLIPIDEMIA: Chronic | ICD-10-CM

## 2024-10-14 DIAGNOSIS — R07.89 ATYPICAL CHEST PAIN: ICD-10-CM

## 2024-10-14 LAB
CV PHARM DOSE: 0.4 MG
CV STRESS BASE HR: 65 BPM
DIASTOLIC BLOOD PRESSURE: 74 MMHG
NUC REST EJECTION FRACTION: 66
OHS CV CPX 1 MINUTE RECOVERY HEART RATE: 101 BPM
OHS CV CPX 85 PERCENT MAX PREDICTED HEART RATE MALE: 133
OHS CV CPX MAX PREDICTED HEART RATE: 156
OHS CV CPX PATIENT IS FEMALE: 0
OHS CV CPX PATIENT IS MALE: 1
OHS CV CPX PEAK DIASTOLIC BLOOD PRESSURE: 63 MMHG
OHS CV CPX PEAK HEAR RATE: 106 BPM
OHS CV CPX PEAK RATE PRESSURE PRODUCT: NORMAL
OHS CV CPX PEAK SYSTOLIC BLOOD PRESSURE: 130 MMHG
OHS CV CPX PERCENT MAX PREDICTED HEART RATE ACHIEVED: 68
OHS CV CPX RATE PRESSURE PRODUCT PRESENTING: 8190
OHS CV INITIAL DOSE: 11 MCG/KG/MIN
OHS CV PEAK DOSE: 32.6 MCG/KG/MIN
OHS CV PHARM TIME: 1408 MIN
SYSTOLIC BLOOD PRESSURE: 126 MMHG

## 2024-10-14 PROCEDURE — A9502 TC99M TETROFOSMIN: HCPCS | Mod: PO | Performed by: INTERNAL MEDICINE

## 2024-10-14 PROCEDURE — 93018 CV STRESS TEST I&R ONLY: CPT | Mod: ,,, | Performed by: INTERNAL MEDICINE

## 2024-10-14 PROCEDURE — 93016 CV STRESS TEST SUPVJ ONLY: CPT | Mod: ,,, | Performed by: INTERNAL MEDICINE

## 2024-10-14 PROCEDURE — 63600175 PHARM REV CODE 636 W HCPCS: Mod: PO | Performed by: INTERNAL MEDICINE

## 2024-10-14 PROCEDURE — 78452 HT MUSCLE IMAGE SPECT MULT: CPT | Mod: PO

## 2024-10-14 PROCEDURE — 78452 HT MUSCLE IMAGE SPECT MULT: CPT | Mod: 26,,, | Performed by: INTERNAL MEDICINE

## 2024-10-14 PROCEDURE — 93017 CV STRESS TEST TRACING ONLY: CPT | Mod: PO

## 2024-10-14 RX ORDER — REGADENOSON 0.08 MG/ML
0.4 INJECTION, SOLUTION INTRAVENOUS
Status: COMPLETED | OUTPATIENT
Start: 2024-10-14 | End: 2024-10-14

## 2024-10-14 RX ADMIN — REGADENOSON 0.4 MG: 0.08 INJECTION, SOLUTION INTRAVENOUS at 02:10

## 2024-10-14 RX ADMIN — TETROFOSMIN 11 MILLICURIE: 1.38 INJECTION, POWDER, LYOPHILIZED, FOR SOLUTION INTRAVENOUS at 02:10

## 2024-10-14 RX ADMIN — TETROFOSMIN 32.6 MILLICURIE: 1.38 INJECTION, POWDER, LYOPHILIZED, FOR SOLUTION INTRAVENOUS at 02:10

## 2024-10-16 ENCOUNTER — OFFICE VISIT (OUTPATIENT)
Dept: FAMILY MEDICINE | Facility: CLINIC | Age: 64
End: 2024-10-16
Payer: COMMERCIAL

## 2024-10-16 DIAGNOSIS — G47.19 EXCESSIVE DAYTIME SLEEPINESS: ICD-10-CM

## 2024-10-16 DIAGNOSIS — G47.00 INSOMNIA, UNSPECIFIED TYPE: Primary | ICD-10-CM

## 2024-10-16 PROCEDURE — 1159F MED LIST DOCD IN RCRD: CPT | Mod: CPTII,95,, | Performed by: FAMILY MEDICINE

## 2024-10-16 PROCEDURE — 3044F HG A1C LEVEL LT 7.0%: CPT | Mod: CPTII,95,, | Performed by: FAMILY MEDICINE

## 2024-10-16 PROCEDURE — 99214 OFFICE O/P EST MOD 30 MIN: CPT | Mod: 95,,, | Performed by: FAMILY MEDICINE

## 2024-10-16 PROCEDURE — 1160F RVW MEDS BY RX/DR IN RCRD: CPT | Mod: CPTII,95,, | Performed by: FAMILY MEDICINE

## 2024-10-16 RX ORDER — TRAZODONE HYDROCHLORIDE 150 MG/1
TABLET ORAL
Qty: 30 TABLET | Refills: 11 | Status: SHIPPED | OUTPATIENT
Start: 2024-10-16

## 2024-10-16 NOTE — PROGRESS NOTES
Subjective:       Patient ID: Osbaldo Brewer is a 63 y.o. male.    Chief Complaint: Insomnia    The patient location is: [place]  The chief complaint leading to consultation is: Insomnia    Visit type: audiovisual    Face to Face time with patient: 15  20 minutes of total time spent on the encounter, which includes face to face time and non-face to face time preparing to see the patient (eg, review of tests), Obtaining and/or reviewing separately obtained history, Documenting clinical information in the electronic or other health record, Independently interpreting results (not separately reported) and communicating results to the patient/family/caregiver, or Care coordination (not separately reported).     Each patient to whom he or she provides medical services by telemedicine is:  (1) informed of the relationship between the physician and patient and the respective role of any other health care provider with respect to management of the patient; and (2) notified that he or she may decline to receive medical services by telemedicine and may withdraw from such care at any time.     Here today for an acute visit.  He is here today complaining of  sleeping issues.  He had a syncopal episode in April and then another episode in Sept.  He is currently getting a cardiac work up and is on a heart monitor.  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   His biggest issue is waking up during the night.  He is excessively fatigued during the day.  No witnessed apnea.          Review of Systems   Constitutional:  Negative for activity change and unexpected weight change.   HENT:  Negative for hearing loss, rhinorrhea and trouble swallowing.    Eyes:  Negative for discharge and visual disturbance.   Respiratory:  Negative for chest tightness and wheezing.    Cardiovascular:  Negative for chest pain and palpitations.   Gastrointestinal:  Negative for blood in stool, constipation, diarrhea and  vomiting.   Endocrine: Negative for polydipsia and polyuria.   Genitourinary:  Negative for difficulty urinating, hematuria and urgency.   Musculoskeletal:  Negative for arthralgias, joint swelling and neck pain.   Neurological:  Positive for weakness and headaches.   Psychiatric/Behavioral:  Positive for dysphoric mood. Negative for confusion.        Objective:      There were no vitals filed for this visit.   Physical Exam  Constitutional:       Appearance: Normal appearance.   Neurological:      General: No focal deficit present.      Mental Status: He is alert and oriented to person, place, and time.   Psychiatric:         Mood and Affect: Mood normal.         Behavior: Behavior normal.         Thought Content: Thought content normal.         Judgment: Judgment normal.            Assessment:       1. Insomnia, unspecified type    2. Excessive daytime sleepiness        Plan:       Insomnia, unspecified type  -     traZODone (DESYREL) 150 MG tablet; 1/3-1 tab PO qhs PRN insomnia  Dispense: 30 tablet; Refill: 11  -     Home Sleep Study; Future    Excessive daytime sleepiness  -     Home Sleep Study; Future    Trial of Trazodone.  Will order sleep study to evaluate for possible DARLYN      Medication List with Changes/Refills   New Medications    TRAZODONE (DESYREL) 150 MG TABLET    1/3-1 tab PO qhs PRN insomnia   Current Medications    ATORVASTATIN (LIPITOR) 40 MG TABLET    Take 1 tablet (40 mg total) by mouth once daily.    AZELASTINE (ASTELIN) 137 MCG (0.1 %) NASAL SPRAY    2 sprays (274 mcg total) by Nasal route 2 (two) times daily.    COQ10, UBIQUINOL, ORAL    Take by mouth.    FAMOTIDINE (PEPCID) 40 MG TABLET    Take 1 tablet (40 mg total) by mouth 2 (two) times daily.    IPRATROPIUM (ATROVENT) 42 MCG (0.06 %) NASAL SPRAY    2 sprays by Each Nostril route 3 (three) times daily as needed (runny nose (rhinitis)).    KETOCONAZOLE (NIZORAL) 2 % CREAM    Apply twice a day to affected areas of skin   Discontinued  Medications    PIMECROLIMUS (ELIDEL) 1 % CREAM    Apply topically 2 (two) times daily.

## 2024-10-25 ENCOUNTER — PATIENT MESSAGE (OUTPATIENT)
Dept: ORTHOPEDICS | Facility: CLINIC | Age: 64
End: 2024-10-25
Payer: COMMERCIAL

## 2024-11-08 DIAGNOSIS — S92.352A CLOSED DISPLACED FRACTURE OF FIFTH METATARSAL BONE OF LEFT FOOT, INITIAL ENCOUNTER: Primary | ICD-10-CM

## 2024-11-12 ENCOUNTER — TELEPHONE (OUTPATIENT)
Dept: CARDIOLOGY | Facility: CLINIC | Age: 64
End: 2024-11-12
Payer: COMMERCIAL

## 2024-11-12 ENCOUNTER — HOSPITAL ENCOUNTER (OUTPATIENT)
Dept: RADIOLOGY | Facility: HOSPITAL | Age: 64
Discharge: HOME | End: 2024-11-12
Attending: ORTHOPAEDIC SURGERY
Payer: COMMERCIAL

## 2024-11-12 ENCOUNTER — OFFICE VISIT (OUTPATIENT)
Dept: ORTHOPEDICS | Facility: CLINIC | Age: 64
End: 2024-11-12
Payer: COMMERCIAL

## 2024-11-12 VITALS — BODY MASS INDEX: 26.24 KG/M2 | WEIGHT: 211 LBS | HEIGHT: 75 IN

## 2024-11-12 DIAGNOSIS — S92.352A CLOSED DISPLACED FRACTURE OF FIFTH METATARSAL BONE OF LEFT FOOT, INITIAL ENCOUNTER: Primary | ICD-10-CM

## 2024-11-12 DIAGNOSIS — M79.672 LEFT FOOT PAIN: ICD-10-CM

## 2024-11-12 DIAGNOSIS — S92.352A CLOSED DISPLACED FRACTURE OF FIFTH METATARSAL BONE OF LEFT FOOT, INITIAL ENCOUNTER: ICD-10-CM

## 2024-11-12 DIAGNOSIS — M89.9 BONE DISORDER: Primary | ICD-10-CM

## 2024-11-12 PROCEDURE — 99999 PR PBB SHADOW E&M-EST. PATIENT-LVL III: CPT | Mod: PBBFAC,,, | Performed by: ORTHOPAEDIC SURGERY

## 2024-11-12 PROCEDURE — 73630 X-RAY EXAM OF FOOT: CPT | Mod: TC,PO,LT

## 2024-11-12 PROCEDURE — 1159F MED LIST DOCD IN RCRD: CPT | Mod: CPTII,S$GLB,, | Performed by: ORTHOPAEDIC SURGERY

## 2024-11-12 PROCEDURE — 1160F RVW MEDS BY RX/DR IN RCRD: CPT | Mod: CPTII,S$GLB,, | Performed by: ORTHOPAEDIC SURGERY

## 2024-11-12 PROCEDURE — 99214 OFFICE O/P EST MOD 30 MIN: CPT | Mod: S$GLB,,, | Performed by: ORTHOPAEDIC SURGERY

## 2024-11-12 PROCEDURE — 3044F HG A1C LEVEL LT 7.0%: CPT | Mod: CPTII,S$GLB,, | Performed by: ORTHOPAEDIC SURGERY

## 2024-11-12 PROCEDURE — 73630 X-RAY EXAM OF FOOT: CPT | Mod: 26,LT,, | Performed by: RADIOLOGY

## 2024-11-12 PROCEDURE — 3008F BODY MASS INDEX DOCD: CPT | Mod: CPTII,S$GLB,, | Performed by: ORTHOPAEDIC SURGERY

## 2024-11-12 RX ORDER — CEFAZOLIN SODIUM 2 G/50ML
2 SOLUTION INTRAVENOUS
OUTPATIENT
Start: 2024-11-12

## 2024-11-12 RX ORDER — MUPIROCIN 20 MG/G
OINTMENT TOPICAL
OUTPATIENT
Start: 2024-11-12

## 2024-11-12 NOTE — TELEPHONE ENCOUNTER
Cardiac clearance for L5 Met ORIF on 12/4. [facility]. Pt not taking blood thinners.     [facility]  Fax: [phone]

## 2024-11-12 NOTE — PROGRESS NOTES
Status/Diagnosis: Displaced Left 5th MT diaphyseal fracture.  Date of Surgery: none  Date of Injury: 04/10/2024  Return visit: 2 weeks postop  X-rays on Return: [facility] 3-views Left foot XOP    Chief Complaint:   Left foot pain    Present History:  Patient presents today via referral from No ref. provider found   Osbaldo Brewer is a 63 y.o. male with acute onset left lateral forefoot pain.  Patient actually endorses a syncopal episode that he relates to be dehydrated on 04/10/2024.  Immediate pain and swelling about the left foot.  X-rays taken at the emergency department were consistent with fracture.  He was placed into a splint with instructions for outpatient orthopedic follow-up.  Denies any prior syncopal episodes.  No foot pain or instability prior to the above.    Pain currently 0/10.  Denies any numbness or tingling.    Denies tobacco use.  Works as a .    05/02/2024:  Patient returns today for repeat evaluation and x-ray.  Reports compliance with nonweightbearing status using his short boot and crutches.  Only minimal complaints of pain in regard to the foot.  Patient does have worsening bilateral low back pain with radiating symptoms down into the leg.  No new history of injury or other inciting event.    05/23/2024:  Patient returns today for repeat evaluation and x-ray.  Mild improvement since last being seen.  4/10 pain.  Reports strict compliance with nonweightbearing status.  Wearing his short boot as instructed.  Continues to work full-time as a .    06/25/2024:  Patient returns today for repeat evaluation and x-ray.  Patient has been either nylon or partial weight-bearing since last being seen.  Still in the short cam boot using crutches for ambulation.  Now with new onset left lower leg, calf, ankle swelling.  Increased pain this morning after tripping over his crutches.  Endorses 0/10 pain currently.    07/25/2024:  Patient returns today for repeat clinical evaluation  and x-ray.  Patient is still with moderate persistent pain and swelling involving both the foot and ankle.  Previous duplex ultrasound to rule out left lower extremity DVT.    Patient has been minimally ambulatory.  States that at the end of the day he was severe swelling > pain.  Mostly localized to the 5th metatarsal but also diffusely about the ankle.    08/13/2024:  Patient returns today for repeat clinical evaluation and MRI results.  Moderate improvement in regard to previously noted ankle swelling however patient attributes this to keeping this elevated most of the time over the last several days.  Again note swelling all seems to be gravity dependent.    09/24/2024:  Patient returns today for repeat clinical evaluation.  Since last being seen he was had multiple syncopal episodes.  Currently with Holter monitor in place.  Still partial weight-bearing using the short cam boot and crutches.  No clear etiology for syncopal episode at this time.    11/12/2024:  Patient returns today for repeat evaluation and to discuss surgical intervention.  He was currently heel weight-bearing in his short cam boot.  4/10 pain.  No new injuries.      Past Medical History:   Diagnosis Date    Basal cell carcinoma     BPH associated with nocturia 01/11/2023    Erectile dysfunction 01/11/2023    History of Helicobacter pylori infection 01/11/2023    History of sinus surgery 01/11/2023    Mixed hyperlipidemia 08/29/2014       Past Surgical History:   Procedure Laterality Date    FUNCTIONAL ENDOSCOPIC SINUS SURGERY (FESS) USING COMPUTER-ASSISTED NAVIGATION Bilateral 3/31/2023    Procedure: SINUS SURGERY FUNCTIONAL ENDOSCOPIC WITH NAVIGATION;  Surgeon: Neville Navarrete MD;  Location: [facility] OR;  Service: ENT;  Laterality: Bilateral;    NASAL SEPTOPLASTY Bilateral 3/31/2023    Procedure: SEPTOPLASTY;  Surgeon: Neville Navarrete MD;  Location: [facility] OR;  Service: ENT;  Laterality: Bilateral;    SINUS SURGERY      WISDOM TOOTH EXTRACTION       states woke up during procedure       Current Outpatient Medications   Medication Sig    atorvastatin (LIPITOR) 40 MG tablet Take 1 tablet (40 mg total) by mouth once daily.    azelastine (ASTELIN) 137 mcg (0.1 %) nasal spray 2 sprays (274 mcg total) by Nasal route 2 (two) times daily.    COQ10, UBIQUINOL, ORAL Take by mouth.    famotidine (PEPCID) 40 MG tablet Take 1 tablet (40 mg total) by mouth 2 (two) times daily.    ipratropium (ATROVENT) 42 mcg (0.06 %) nasal spray 2 sprays by Each Nostril route 3 (three) times daily as needed (runny nose (rhinitis)).    ketoconazole (NIZORAL) 2 % cream Apply twice a day to affected areas of skin    traZODone (DESYREL) 150 MG tablet 1/3-1 tab PO qhs PRN insomnia     No current facility-administered medications for this visit.       Review of patient's allergies indicates:   Allergen Reactions    Oxycodone-acetaminophen Rash       No family history on file.    Social History     Socioeconomic History    Marital status:    Tobacco Use    Smoking status: Never    Smokeless tobacco: Never   Substance and Sexual Activity    Alcohol use: Yes     Comment: occ    Drug use: Never     Social Drivers of Health     Physical Activity: Unknown (6/21/2024)    Exercise Vital Sign     Days of Exercise per Week: 0 days     Minutes of Exercise per Session: Patient declined   Stress: Stress Concern Present (6/21/2024)    [employer] - Occupational Stress Questionnaire     Feeling of Stress : To some extent       Physical exam:  There were no vitals filed for this visit.  Body mass index is 26.37 kg/m².  General: In no apparent distress; well developed and well nourished.  HEENT: normocephalic; atraumatic.  Cardiovascular: regular rate.  Respiratory: no increased work of breathing.  Musculoskeletal:   Gait: did not assess  Inspection:   Mild residual swelling about the ankle with distal extension.  Mild tenderness of the 5th metatarsal shaft fracture site.   Minimal tenderness over the deltoid and ATFL.  No edson laxity with anterior drawer or varus/valgus talar tilt testing.  Minimal pain with resisted ankle dorsiflexion/eversion but with 5/5 strength.  Silfverskiold: Negative  Strength:              Dorsiflexion 5/5  Plantar flexion 5/5  Inversion 5/5  Eversion 5/5  Sensation:              SILT distally  ROM:              Ankle: full and painless              Subtalar: full and painless  Pulses: Palpable pedal pulse                   Imaging Studies/Outside documentation:  I have ordered/reviewed/interpreted the following images/outside documentation:  1. WB 3-views of Left foot:  On my independent review, persistent long spiral fracture of the 5th metatarsal distal diaphysis.  Approximately 80% medial translation of the distal fragment.  4 mm of shortening.  Relatively unchanged.  Sagittal alignment acceptable.    No significant callus formation noted.  Questionable development of bony union involving the small area of bony apposition laterally.    2. MRI Left ankle w/o contrast on 08/05/2024:  On my independent review, previously noted subacute left 5th metatarsal diaphyseal fracture.  Patient has fairly diffuse subcutaneous edema throughout the ankle with distal extension.  No evidence of osteochondral lesion, ligament/tendon tear.        Assessment:  Osbaldo Brewer is a 63 y.o. male with Displaced Left 5th MT diaphyseal fracture.  Left ankle deltoid injury; peroneal tendon strain.     Plan:   Clinical and radiographic findings were discussed. Operative vs nonoperative treatment options were described. These include but are not limited to bleeding; infection; damage to surrounding nerves or vessels; persistent pain, stiffness; nonunion; malunion; recurrent deformity; need for additional procedures; amputation; blood clots; pulmonary embolus; cardiac events; stroke; and the general risks of anesthesia including anesthetic death.   We also reviewed postop  protocol as well as limitations and expectations.   Specifically discussed increased risk for nonunion given minimal bone healing after nonoperative management x 7 months.  Patient understands and desires to proceed with surgical intervention.   Explained risks, benefits, and alternative to the patient. Asked if any questions--none.  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      Patient will be nonweightbearing for at least 6 weeks postop followed by progressive weight-bearing for another 4 weeks in his short cam boot.    We will obtain preoperative vitamin-D levels as well as clearance from his cardiologist, Dr. Pa.        This note was created using voice recognition software and may contain grammatical errors.

## 2024-11-12 NOTE — H&P (VIEW-ONLY)
Status/Diagnosis: Displaced Left 5th MT diaphyseal fracture.  Date of Surgery: none  Date of Injury: 04/10/2024  Return visit: 2 weeks postop  X-rays on Return: [facility] 3-views Left foot XOP    Chief Complaint:   Left foot pain    Present History:  Patient presents today via referral from No ref. provider found   Osbaldo Brewer is a 63 y.o. male with acute onset left lateral forefoot pain.  Patient actually endorses a syncopal episode that he relates to be dehydrated on 04/10/2024.  Immediate pain and swelling about the left foot.  X-rays taken at the emergency department were consistent with fracture.  He was placed into a splint with instructions for outpatient orthopedic follow-up.  Denies any prior syncopal episodes.  No foot pain or instability prior to the above.    Pain currently 0/10.  Denies any numbness or tingling.    Denies tobacco use.  Works as a .    05/02/2024:  Patient returns today for repeat evaluation and x-ray.  Reports compliance with nonweightbearing status using his short boot and crutches.  Only minimal complaints of pain in regard to the foot.  Patient does have worsening bilateral low back pain with radiating symptoms down into the leg.  No new history of injury or other inciting event.    05/23/2024:  Patient returns today for repeat evaluation and x-ray.  Mild improvement since last being seen.  4/10 pain.  Reports strict compliance with nonweightbearing status.  Wearing his short boot as instructed.  Continues to work full-time as a .    06/25/2024:  Patient returns today for repeat evaluation and x-ray.  Patient has been either nylon or partial weight-bearing since last being seen.  Still in the short cam boot using crutches for ambulation.  Now with new onset left lower leg, calf, ankle swelling.  Increased pain this morning after tripping over his crutches.  Endorses 0/10 pain currently.    07/25/2024:  Patient returns today for repeat clinical evaluation  and x-ray.  Patient is still with moderate persistent pain and swelling involving both the foot and ankle.  Previous duplex ultrasound to rule out left lower extremity DVT.    Patient has been minimally ambulatory.  States that at the end of the day he was severe swelling > pain.  Mostly localized to the 5th metatarsal but also diffusely about the ankle.    08/13/2024:  Patient returns today for repeat clinical evaluation and MRI results.  Moderate improvement in regard to previously noted ankle swelling however patient attributes this to keeping this elevated most of the time over the last several days.  Again note swelling all seems to be gravity dependent.    09/24/2024:  Patient returns today for repeat clinical evaluation.  Since last being seen he was had multiple syncopal episodes.  Currently with Holter monitor in place.  Still partial weight-bearing using the short cam boot and crutches.  No clear etiology for syncopal episode at this time.    11/12/2024:  Patient returns today for repeat evaluation and to discuss surgical intervention.  He was currently heel weight-bearing in his short cam boot.  4/10 pain.  No new injuries.      Past Medical History:   Diagnosis Date    Basal cell carcinoma     BPH associated with nocturia 01/11/2023    Erectile dysfunction 01/11/2023    History of Helicobacter pylori infection 01/11/2023    History of sinus surgery 01/11/2023    Mixed hyperlipidemia 08/29/2014       Past Surgical History:   Procedure Laterality Date    FUNCTIONAL ENDOSCOPIC SINUS SURGERY (FESS) USING COMPUTER-ASSISTED NAVIGATION Bilateral 3/31/2023    Procedure: SINUS SURGERY FUNCTIONAL ENDOSCOPIC WITH NAVIGATION;  Surgeon: Neville Navarrete MD;  Location: [facility] OR;  Service: ENT;  Laterality: Bilateral;    NASAL SEPTOPLASTY Bilateral 3/31/2023    Procedure: SEPTOPLASTY;  Surgeon: Neville Navarrete MD;  Location: [facility] OR;  Service: ENT;  Laterality: Bilateral;    SINUS SURGERY      WISDOM TOOTH EXTRACTION       states woke up during procedure       Current Outpatient Medications   Medication Sig    atorvastatin (LIPITOR) 40 MG tablet Take 1 tablet (40 mg total) by mouth once daily.    azelastine (ASTELIN) 137 mcg (0.1 %) nasal spray 2 sprays (274 mcg total) by Nasal route 2 (two) times daily.    COQ10, UBIQUINOL, ORAL Take by mouth.    famotidine (PEPCID) 40 MG tablet Take 1 tablet (40 mg total) by mouth 2 (two) times daily.    ipratropium (ATROVENT) 42 mcg (0.06 %) nasal spray 2 sprays by Each Nostril route 3 (three) times daily as needed (runny nose (rhinitis)).    ketoconazole (NIZORAL) 2 % cream Apply twice a day to affected areas of skin    traZODone (DESYREL) 150 MG tablet 1/3-1 tab PO qhs PRN insomnia     No current facility-administered medications for this visit.       Review of patient's allergies indicates:   Allergen Reactions    Oxycodone-acetaminophen Rash       No family history on file.    Social History     Socioeconomic History    Marital status:    Tobacco Use    Smoking status: Never    Smokeless tobacco: Never   Substance and Sexual Activity    Alcohol use: Yes     Comment: occ    Drug use: Never     Social Drivers of Health     Physical Activity: Unknown (6/21/2024)    Exercise Vital Sign     Days of Exercise per Week: 0 days     Minutes of Exercise per Session: Patient declined   Stress: Stress Concern Present (6/21/2024)    [employer] - Occupational Stress Questionnaire     Feeling of Stress : To some extent       Physical exam:  There were no vitals filed for this visit.  Body mass index is 26.37 kg/m².  General: In no apparent distress; well developed and well nourished.  HEENT: normocephalic; atraumatic.  Cardiovascular: regular rate.  Respiratory: no increased work of breathing.  Musculoskeletal:   Gait: did not assess  Inspection:   Mild residual swelling about the ankle with distal extension.  Mild tenderness of the 5th metatarsal shaft fracture site.   Minimal tenderness over the deltoid and ATFL.  No edson laxity with anterior drawer or varus/valgus talar tilt testing.  Minimal pain with resisted ankle dorsiflexion/eversion but with 5/5 strength.  Silfverskiold: Negative  Strength:              Dorsiflexion 5/5  Plantar flexion 5/5  Inversion 5/5  Eversion 5/5  Sensation:              SILT distally  ROM:              Ankle: full and painless              Subtalar: full and painless  Pulses: Palpable pedal pulse                   Imaging Studies/Outside documentation:  I have ordered/reviewed/interpreted the following images/outside documentation:  1. WB 3-views of Left foot:  On my independent review, persistent long spiral fracture of the 5th metatarsal distal diaphysis.  Approximately 80% medial translation of the distal fragment.  4 mm of shortening.  Relatively unchanged.  Sagittal alignment acceptable.    No significant callus formation noted.  Questionable development of bony union involving the small area of bony apposition laterally.    2. MRI Left ankle w/o contrast on 08/05/2024:  On my independent review, previously noted subacute left 5th metatarsal diaphyseal fracture.  Patient has fairly diffuse subcutaneous edema throughout the ankle with distal extension.  No evidence of osteochondral lesion, ligament/tendon tear.        Assessment:  Osbaldo Brewer is a 63 y.o. male with Displaced Left 5th MT diaphyseal fracture.  Left ankle deltoid injury; peroneal tendon strain.     Plan:   Clinical and radiographic findings were discussed. Operative vs nonoperative treatment options were described. These include but are not limited to bleeding; infection; damage to surrounding nerves or vessels; persistent pain, stiffness; nonunion; malunion; recurrent deformity; need for additional procedures; amputation; blood clots; pulmonary embolus; cardiac events; stroke; and the general risks of anesthesia including anesthetic death.   We also reviewed postop  protocol as well as limitations and expectations.   Specifically discussed increased risk for nonunion given minimal bone healing after nonoperative management x 7 months.  Patient understands and desires to proceed with surgical intervention.   Explained risks, benefits, and alternative to the patient. Asked if any questions--none.  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      Patient will be nonweightbearing for at least 6 weeks postop followed by progressive weight-bearing for another 4 weeks in his short cam boot.    We will obtain preoperative vitamin-D levels as well as clearance from his cardiologist, Dr. Pa.        This note was created using voice recognition software and may contain grammatical errors.

## 2024-11-13 ENCOUNTER — TELEPHONE (OUTPATIENT)
Dept: FAMILY MEDICINE | Facility: CLINIC | Age: 64
End: 2024-11-13
Payer: COMMERCIAL

## 2024-11-15 ENCOUNTER — TELEPHONE (OUTPATIENT)
Dept: FAMILY MEDICINE | Facility: CLINIC | Age: 64
End: 2024-11-15
Payer: COMMERCIAL

## 2024-11-15 NOTE — TELEPHONE ENCOUNTER
----- Message from Diana sent at 11/15/2024  1:20 PM CST -----  Regarding: Patient Callback  Name of Who is Calling:   KAITLIN SANTANA [[id number]]     What is the request in detail:   Patient was scheduled an appointment for Monday. The patient would like a call back to confirm the necessity of the appointment because the patient currently has a broken foot and has been in and out of the hospital.    The patient wanted to know if instead of coming in could they do a phone call     Can the clinic reply by [facility]:   No     What Number to Call Back if not in [facility]:  [phone]

## 2024-11-19 ENCOUNTER — HOSPITAL ENCOUNTER (OUTPATIENT)
Dept: RADIOLOGY | Facility: HOSPITAL | Age: 64
Discharge: HOME | End: 2024-11-19
Attending: NURSE PRACTITIONER
Payer: COMMERCIAL

## 2024-11-19 ENCOUNTER — OFFICE VISIT (OUTPATIENT)
Dept: FAMILY MEDICINE | Facility: CLINIC | Age: 64
End: 2024-11-19
Payer: COMMERCIAL

## 2024-11-19 VITALS
OXYGEN SATURATION: 98 % | WEIGHT: 220.25 LBS | SYSTOLIC BLOOD PRESSURE: 124 MMHG | TEMPERATURE: 98 F | HEIGHT: 75 IN | BODY MASS INDEX: 27.38 KG/M2 | HEART RATE: 78 BPM | DIASTOLIC BLOOD PRESSURE: 76 MMHG

## 2024-11-19 DIAGNOSIS — R55 RECURRENT SYNCOPE: ICD-10-CM

## 2024-11-19 DIAGNOSIS — J30.9 ALLERGIC RHINITIS, UNSPECIFIED SEASONALITY, UNSPECIFIED TRIGGER: ICD-10-CM

## 2024-11-19 DIAGNOSIS — Z01.818 PREOPERATIVE EXAMINATION: ICD-10-CM

## 2024-11-19 DIAGNOSIS — K21.9 GASTROESOPHAGEAL REFLUX DISEASE, UNSPECIFIED WHETHER ESOPHAGITIS PRESENT: ICD-10-CM

## 2024-11-19 DIAGNOSIS — E78.2 MIXED HYPERLIPIDEMIA: ICD-10-CM

## 2024-11-19 DIAGNOSIS — Z01.818 PREOPERATIVE EXAMINATION: Primary | ICD-10-CM

## 2024-11-19 DIAGNOSIS — G47.00 INSOMNIA, UNSPECIFIED TYPE: ICD-10-CM

## 2024-11-19 PROCEDURE — 3044F HG A1C LEVEL LT 7.0%: CPT | Mod: CPTII,S$GLB,, | Performed by: NURSE PRACTITIONER

## 2024-11-19 PROCEDURE — 99999 PR PBB SHADOW E&M-EST. PATIENT-LVL V: CPT | Mod: PBBFAC,,, | Performed by: NURSE PRACTITIONER

## 2024-11-19 PROCEDURE — 71046 X-RAY EXAM CHEST 2 VIEWS: CPT | Mod: 26,,, | Performed by: RADIOLOGY

## 2024-11-19 PROCEDURE — 3078F DIAST BP <80 MM HG: CPT | Mod: CPTII,S$GLB,, | Performed by: NURSE PRACTITIONER

## 2024-11-19 PROCEDURE — 1159F MED LIST DOCD IN RCRD: CPT | Mod: CPTII,S$GLB,, | Performed by: NURSE PRACTITIONER

## 2024-11-19 PROCEDURE — 99214 OFFICE O/P EST MOD 30 MIN: CPT | Mod: S$GLB,,, | Performed by: NURSE PRACTITIONER

## 2024-11-19 PROCEDURE — 3074F SYST BP LT 130 MM HG: CPT | Mod: CPTII,S$GLB,, | Performed by: NURSE PRACTITIONER

## 2024-11-19 PROCEDURE — 3008F BODY MASS INDEX DOCD: CPT | Mod: CPTII,S$GLB,, | Performed by: NURSE PRACTITIONER

## 2024-11-19 PROCEDURE — 71046 X-RAY EXAM CHEST 2 VIEWS: CPT | Mod: TC,FY,PO

## 2024-11-19 RX ORDER — AZELASTINE 1 MG/ML
2 SPRAY, METERED NASAL 2 TIMES DAILY
Qty: 30 ML | Refills: 6 | Status: SHIPPED | OUTPATIENT
Start: 2024-11-19 | End: 2025-11-19

## 2024-11-19 RX ORDER — IPRATROPIUM BROMIDE 42 UG/1
2 SPRAY, METERED NASAL 3 TIMES DAILY PRN
Qty: 15 ML | Refills: 11 | Status: SHIPPED | OUTPATIENT
Start: 2024-11-19

## 2024-11-19 NOTE — PROGRESS NOTES
Subjective:       Patient ID: Osbaldo Brewer is a 63 y.o. male.    Chief Complaint: Pre-op Exam    HPI  Patient is scheduled for left metatarsal ORIF on 12/4/24 with Dr Ramirez     Recall recurrent syncope, episode in April led to foot injury. syncopal episode 9/2024-- workup including labs, EKG, 30 day cardiac event monitor, echo, US carotids and stress test all negative. No further syncopal episodes after workup. Cancelled FU to review results. Has received cardiac clearance from Dr. Pa.     Drinking plenty of fluid. Not associated with fasting.     He saw Dr Navarrete 9/18/24 who recommended vascular imaging head/neck following cardiac eval     Vitals:    11/19/24 0952   BP: 124/76   Pulse: 78   Temp: 97.9 °F (36.6 °C)     Review of Systems   Constitutional:  Negative for fever.   Respiratory:  Negative for cough and shortness of breath.    Cardiovascular:  Negative for chest pain.   Musculoskeletal:  Positive for arthralgias.   Neurological:  Positive for syncope.   Psychiatric/Behavioral:  Positive for sleep disturbance.        Past Medical History:   Diagnosis Date    Basal cell carcinoma     BPH associated with nocturia 01/11/2023    Erectile dysfunction 01/11/2023    History of Helicobacter pylori infection 01/11/2023    History of sinus surgery 01/11/2023    Mixed hyperlipidemia 08/29/2014     Objective:      Physical Exam  Vitals and nursing note reviewed.   Constitutional:       General: He is not in acute distress.     Appearance: He is not diaphoretic.   HENT:      Head: Normocephalic.   Eyes:      General: Lids are normal.         Right eye: No discharge.         Left eye: No discharge.   Neck:      Trachea: No tracheal deviation.   Cardiovascular:      Rate and Rhythm: Normal rate and regular rhythm.      Heart sounds: Normal heart sounds.   Pulmonary:      Effort: Pulmonary effort is normal.      Breath sounds: Normal breath sounds.   Skin:     Coloration: Skin is not pale.    Neurological:      Mental Status: He is alert and oriented to person, place, and time.   Psychiatric:         Speech: Speech normal.         Behavior: Behavior normal.         Thought Content: Thought content normal.         Judgment: Judgment normal.         Assessment:       1. Preoperative examination    2. Recurrent syncope    3. Mixed hyperlipidemia    4. Gastroesophageal reflux disease, unspecified whether esophagitis present    5. Insomnia, unspecified type    6. Allergic rhinitis, unspecified seasonality, unspecified trigger        Plan:       Preoperative examination  -     CBC Auto Differential; Future; Expected date: 11/19/2024  -     Comprehensive Metabolic Panel; Future; Expected date: 11/19/2024  -     X-Ray Chest PA And Lateral; Future; Expected date: 11/19/2024    Recurrent syncope  -     CTA Head and Neck (xpd); Future; Expected date: 11/19/2024  -     Home Sleep Study; Future    Mixed hyperlipidemia    Gastroesophageal reflux disease, unspecified whether esophagitis present    Insomnia, unspecified type  -     Home Sleep Study; Future    Allergic rhinitis, unspecified seasonality, unspecified trigger  -     azelastine (ASTELIN) 137 mcg (0.1 %) nasal spray; 2 sprays (274 mcg total) by Nasal route 2 (two) times daily.  Dispense: 30 mL; Refill: 6  -     ipratropium (ATROVENT) 42 mcg (0.06 %) nasal spray; 2 sprays by Each Nostril route 3 (three) times daily as needed (runny nose (rhinitis)).  Dispense: 15 mL; Refill: 11    Medically cleared   Complete sleep study   Workup as above        Medication List with Changes/Refills   Current Medications    ATORVASTATIN (LIPITOR) 40 MG TABLET    Take 1 tablet (40 mg total) by mouth once daily.    COQ10, UBIQUINOL, ORAL    Take by mouth.    FAMOTIDINE (PEPCID) 40 MG TABLET    Take 1 tablet (40 mg total) by mouth 2 (two) times daily.    KETOCONAZOLE (NIZORAL) 2 % CREAM    Apply twice a day to affected areas of skin    TRAZODONE (DESYREL) 150 MG TABLET    1/3-1  tab PO qhs PRN insomnia   Changed and/or Refilled Medications    Modified Medication Previous Medication    AZELASTINE (ASTELIN) 137 MCG (0.1 %) NASAL SPRAY azelastine (ASTELIN) 137 mcg (0.1 %) nasal spray       2 sprays (274 mcg total) by Nasal route 2 (two) times daily.    2 sprays (274 mcg total) by Nasal route 2 (two) times daily.    IPRATROPIUM (ATROVENT) 42 MCG (0.06 %) NASAL SPRAY ipratropium (ATROVENT) 42 mcg (0.06 %) nasal spray       2 sprays by Each Nostril route 3 (three) times daily as needed (runny nose (rhinitis)).    2 sprays by Each Nostril route 3 (three) times daily as needed (runny nose (rhinitis)).

## 2024-11-20 ENCOUNTER — HOSPITAL ENCOUNTER (OUTPATIENT)
Dept: RADIOLOGY | Facility: HOSPITAL | Age: 64
Discharge: HOME | End: 2024-11-20
Attending: NURSE PRACTITIONER
Payer: COMMERCIAL

## 2024-11-20 DIAGNOSIS — R55 RECURRENT SYNCOPE: ICD-10-CM

## 2024-11-20 PROCEDURE — 70496 CT ANGIOGRAPHY HEAD: CPT | Mod: TC

## 2024-11-20 PROCEDURE — 25500020 PHARM REV CODE 255

## 2024-11-20 PROCEDURE — 70496 CT ANGIOGRAPHY HEAD: CPT | Mod: 26,,, | Performed by: RADIOLOGY

## 2024-11-20 PROCEDURE — 70498 CT ANGIOGRAPHY NECK: CPT | Mod: 26,,, | Performed by: RADIOLOGY

## 2024-11-20 RX ADMIN — IOHEXOL 75 ML: 350 INJECTION, SOLUTION INTRAVENOUS at 02:11

## 2024-11-21 ENCOUNTER — TELEPHONE (OUTPATIENT)
Dept: FAMILY MEDICINE | Facility: CLINIC | Age: 64
End: 2024-11-21
Payer: COMMERCIAL

## 2024-11-21 NOTE — TELEPHONE ENCOUNTER
----- Message from Anisa James NP sent at 11/21/2024  8:13 AM CST -----  Please notify patient imaging is normal. He requires cardiac clearance for surgery --given workup has been unremarkable this shouldn't be an issue. Keep follow ups with specialists and PCP as scheduled. Needs to be evaluated if he has another syncopal episode

## 2024-11-22 ENCOUNTER — TELEPHONE (OUTPATIENT)
Facility: CLINIC | Age: 64
End: 2024-11-22
Payer: COMMERCIAL

## 2024-11-22 NOTE — TELEPHONE ENCOUNTER
----- Message from Kelly sent at 11/22/2024  1:01 PM CST -----  Contact: PT  Type:  Sooner Apoointment Request      Name of Caller:PT   When is the first available appointment? N/A  Symptoms:RASH  Would the patient rather a call back or a response via [facility]? CALL   Best Call Back Number:[phone]  Additional Information: PT WOULD LIKE TO BE SEEN WITH HIS WIFE, CHET SANTANA [[id number]] ON 12/18/24 AT 3PM   PLEASE ARTHUR TO CONFIRM  THANK YOU

## 2024-11-25 ENCOUNTER — OFFICE VISIT (OUTPATIENT)
Facility: CLINIC | Age: 64
End: 2024-11-25
Payer: COMMERCIAL

## 2024-11-25 DIAGNOSIS — L57.0 AK (ACTINIC KERATOSIS): ICD-10-CM

## 2024-11-25 DIAGNOSIS — L82.1 SK (SEBORRHEIC KERATOSIS): ICD-10-CM

## 2024-11-25 DIAGNOSIS — L57.8 ACTINIC SKIN DAMAGE: Primary | ICD-10-CM

## 2024-11-25 DIAGNOSIS — D22.9 MULTIPLE BENIGN NEVI: ICD-10-CM

## 2024-11-25 DIAGNOSIS — L81.4 LENTIGINES: ICD-10-CM

## 2024-11-25 PROCEDURE — 3044F HG A1C LEVEL LT 7.0%: CPT | Mod: CPTII,S$GLB,, | Performed by: DERMATOLOGY

## 2024-11-25 PROCEDURE — 17000 DESTRUCT PREMALG LESION: CPT | Mod: S$GLB,,, | Performed by: DERMATOLOGY

## 2024-11-25 PROCEDURE — 99214 OFFICE O/P EST MOD 30 MIN: CPT | Mod: 25,S$GLB,, | Performed by: DERMATOLOGY

## 2024-11-25 PROCEDURE — 1159F MED LIST DOCD IN RCRD: CPT | Mod: CPTII,S$GLB,, | Performed by: DERMATOLOGY

## 2024-11-25 PROCEDURE — 17003 DESTRUCT PREMALG LES 2-14: CPT | Mod: S$GLB,,, | Performed by: DERMATOLOGY

## 2024-11-25 PROCEDURE — 99999 PR PBB SHADOW E&M-EST. PATIENT-LVL II: CPT | Mod: PBBFAC,,, | Performed by: DERMATOLOGY

## 2024-11-25 RX ORDER — FLUOROURACIL 50 MG/G
CREAM TOPICAL
Qty: 40 G | Refills: 1 | Status: SHIPPED | OUTPATIENT
Start: 2024-11-25

## 2024-11-25 NOTE — PATIENT INSTRUCTIONS
Field Treatment for Actinic Keratoses (precancerous lesions)    5-Fluorouracil - This is a topical chemotherapy for your skin. This cream should never be applied without discussing with you dermatologist.    This treatment gets your immune system involved in fighting precancers (actinic keratoses), even those we can't yet see.     HOW TO APPLY: Apply a fingertip length amount to the entire area (face) 2x/day for 2-4 weeks. Wash your hands carefully after applying the cream and wipe glasses, BIPAP/CPAP machines, or anything else that comes in frequent contact with the cream.    WHAT TO EXPECT: Your skin will likely become red, crusted, sore, and tender during the treatment usually starting around day 3 and continuing for about 1 week after last application. Your skin may take up to 6 weeks to return to its normal coloring (lose the pink).     HOW TO HEAL FAST: To speed healing, wash with a gentle wash and apply Vaseline jelly especially to crusted open areas.    WE CAN HELP: Please contact us for any questions or problem shooting the application of these creams: [phone] or [email or web address]    IT WILL BE WORTH IT!!!     CRYOSURGERY      Your doctor has used a method called cryosurgery to treat your skin condition. Cryosurgery refers to the use of very cold substances to treat a variety of skin conditions such as warts, pre-skin cancers, molluscum contagiosum, sun spots, and several benign growths. The substance we use in cryosurgery is liquid nitrogen and is so cold (-195 degrees Celsius) that is burns when administered.     Following treatment in the office, the skin may immediately burn and become red. You may find the area around the lesion is affected as well. It is sometimes necessary to treat not only the lesion, but a small area of the surrounding normal skin to achieve a good response.     A blister, and even a blood filled blister, may form after treatment.   This is a normal response. If the blister  is painful, it is acceptable to sterilize a needle and with rubbing alcohol and gently pop the blister. It is important that you gently wash the area with soap and warm water as the blister fluid may contain wart virus if a wart was treated. Do no remove the roof of the blister.     The area treated can take anywhere from 1-3 weeks to heal. Healing time depends on the kind skin lesion treated, the location, and how aggressively the lesion was treated. It is recommended that the areas treated are covered with Vaseline or bacitracin ointment and a band-aid. If a band-aid is not practical, just ointment applied several times per day will do. Keeping these areas moist will speed the healing time.    Treatment with liquid nitrogen can leave a scar. In dark skin, it may be a light or dark scar, in light skin it may be a white or pink scar. These will generally fade with time, but may never go away completely.     If you have any concerns after your treatment, please feel free to call the office.       [facility] [place] [address]/ [phone] FAX/ [email or web address] What Are the Symptoms of Skin Cancer?  A change in your skin is the most common sign of skin cancer. This could be a new growth, a sore that doesnt heal, or a change in a mole. Not all skin cancers look the same.    For melanoma specifically, a simple way to remember the warning signs is to remember the A-B-C-D-Es of melanoma--    A stands for asymmetrical. Does the mole or spot have an irregular shape with two parts that look very different?  B stands for border. Is the border irregular or jagged?  C is for color. Is the color uneven?  D is for diameter. Is the mole or spot larger than the size of a pea?  E is for evolving. Has the mole or spot changed during the past few weeks or months?    Talk to your doctor if you notice changes in your skin such as a new growth, a sore that doesnt heal, a change in an old growth,  or any of the A-B-C-D-Es of melanoma    What Can I Do to Reduce My Risk of Skin Cancer?  Protection from ultraviolet (UV) radiation is important all year, not just during the summer or at the beach. UV rays from the sun can reach you on cloudy and hazy days, not just on bright and barbara days. UV rays also reflect off of surfaces like water, cement, sand, and snow. Indoor tanning (using a tanning bed, soriano, or sunlamp to get tan) exposes users to UV radiation.    The hours between 10 a.m. and 4 p.m. Daylight Saving Time (9 a.m. to 3 p.m. standard time) are the most hazardous for UV exposure outdoors in the continental [place]. UV rays from sunlight are the greatest during the late spring and early summer in [place].    CDC recommends easy options for protection from UV radiation--    Stay in the shade or indoors, especially during midday hours.  Wear clothing that covers your arms and legs.  Wear a hat with a wide brim to shade your face, head, ears, and neck.  Wear sunglasses that wrap around and block both UVA and UVB rays.  Use sunscreen with a sun protection factor (SPF) of 30 or higher, and both UVA and UVB (broad spectrum) protection.  Avoid indoor tanning.    Adapted from https://www.cdc.gov/cancer/skin/basic_info/

## 2024-11-25 NOTE — PROGRESS NOTES
Subjective:      Patient ID:  Osbaldo Brewer is a 63 y.o. male who presents for   Chief Complaint   Patient presents with    Actinic Keratosis     3 month follow up     HPI  Established patient.  Hx of NMSC.   LV 3 months ago TBSE - treated several AK and ISK with cryo, otherwise only benign lesions.   Here earlier than recommended 6 month f/u for spot check  - lesion at midline upper forehead, L temple, chest, back.     +NMSC  BCC at R chest, R arm    Review of Systems    Objective:   Physical Exam   Constitutional: He appears well-developed and well-nourished. He is cooperative.   HENT:   Head: Normocephalic and atraumatic.   Eyes: Lids are normal. Lids are normal.  Right conjunctiva is not injected. Left conjunctiva is not injected. No conjunctival no injection.   Pulmonary/Chest: No respiratory distress.   Musculoskeletal:      Right lower leg: No edema.      Left lower leg: No edema.   Neurological: He is alert and oriented to person, place, and time.   Psychiatric: He has a normal mood and affect. His speech is normal and behavior is normal. Mood, affect, judgment and thought content normal.   Skin:   Areas Examined (abnormalities noted in diagram):   Scalp / Hair Palpated and Inspected  Head / Face Inspection Performed            Diagram Legend     Erythematous scaling macule/papule c/w actinic keratosis       Vascular papule c/w angioma      Pigmented verrucoid papule/plaque c/w seborrheic keratosis      Yellow umbilicated papule c/w sebaceous hyperplasia      Irregularly shaped tan macule c/w lentigo     1-2 mm smooth white papules consistent with Milia      Movable subcutaneous cyst with punctum c/w epidermal inclusion cyst      Subcutaneous movable cyst c/w pilar cyst      Firm pink to brown papule c/w dermatofibroma      Pedunculated fleshy papule(s) c/w skin tag(s)      Evenly pigmented macule c/w junctional nevus     Mildly variegated pigmented, slightly irregular-bordered macule c/w mildly  atypical nevus      Flesh colored to evenly pigmented papule c/w intradermal nevus       Pink pearly papule/plaque c/w basal cell carcinoma      Erythematous hyperkeratotic cursted plaque c/w SCC      Surgical scar with no sign of skin cancer recurrence      Open and closed comedones      Inflammatory papules and pustules      Verrucoid papule consistent consistent with wart     Erythematous eczematous patches and plaques     Dystrophic onycholytic nail with subungual debris c/w onychomycosis     Umbilicated papule    Erythematous-base heme-crusted tan verrucoid plaque consistent with inflamed seborrheic keratosis     Erythematous Silvery Scaling Plaque c/w Psoriasis     See annotation      Assessment / Plan:      AK (actinic keratosis)  - Discussed diagnosis, etiology, and precancerous nature of condition.   - Cryosurgery Procedure Note: Discussed procedure with patient/patient's guardian including risks and benefits as well as treatment alternatives. Risks of procedure include pain, itching, swelling, redness, blistering, crusting, wound formation, post-inflammatory pigmentary alteration, scar, recurrence. Verbal consent obtained. LN2 cryosurgery performed to 3 lesion(s). Patient tolerated procedure well. After-visit wound care instructions reviewed and provided in writing.      Actinic skin damage  Patient motivated for field therapy - Efudex cream BID x 2-4 weeks for face  - Counseled on potential SE of medication(s) and instructed on use.     Multiple benign nevi  - Discussed diagnosis, etiology, and benign-nature of condition.  - Reassured; no lesions suspicious for malignancy noted on exam today.   - Recommended routine self examination of skin. Discussed the ABCDEs of melanoma and ugly duckling sign.   - Recommended daily sun protection, including the use of OTC broad-spectrum sunscreen (SPF 30 or greater) and sun-protective clothing.      Lentigines  - Benign; reassured treatment not necessary.   -  Recommended daily sun protection, including the use of OTC broad-spectrum sunscreen (SPF 30 or greater) and sun-protective clothing.       SK (seborrheic keratosis)  - Benign; reassured treatment not necessary.           Follow up in about 3 months (around 2/25/2025) for tbse, efudex f/u.

## 2024-11-27 ENCOUNTER — TELEPHONE (OUTPATIENT)
Dept: FAMILY MEDICINE | Facility: CLINIC | Age: 64
End: 2024-11-27
Payer: COMMERCIAL

## 2024-12-03 ENCOUNTER — ANESTHESIA EVENT (OUTPATIENT)
Dept: SURGERY | Facility: HOSPITAL | Age: 64
End: 2024-12-03
Payer: MEDICARE

## 2024-12-03 RX ORDER — HYDROMORPHONE HYDROCHLORIDE 2 MG/ML
0.2 INJECTION, SOLUTION INTRAMUSCULAR; INTRAVENOUS; SUBCUTANEOUS EVERY 5 MIN PRN
OUTPATIENT
Start: 2024-12-03

## 2024-12-03 RX ORDER — GLUCAGON 1 MG
1 KIT INJECTION
OUTPATIENT
Start: 2024-12-03

## 2024-12-03 RX ORDER — FENTANYL CITRATE 50 UG/ML
25 INJECTION, SOLUTION INTRAMUSCULAR; INTRAVENOUS EVERY 5 MIN PRN
OUTPATIENT
Start: 2024-12-03 | End: 2024-12-03

## 2024-12-04 ENCOUNTER — HOSPITAL ENCOUNTER (OUTPATIENT)
Facility: HOSPITAL | Age: 64
Discharge: HOME | End: 2024-12-04
Attending: ORTHOPAEDIC SURGERY | Admitting: ORTHOPAEDIC SURGERY
Payer: MEDICARE

## 2024-12-04 ENCOUNTER — TELEPHONE (OUTPATIENT)
Dept: ORTHOPEDICS | Facility: CLINIC | Age: 64
End: 2024-12-04
Payer: MEDICARE

## 2024-12-04 ENCOUNTER — ANESTHESIA (OUTPATIENT)
Dept: SURGERY | Facility: HOSPITAL | Age: 64
End: 2024-12-04
Payer: MEDICARE

## 2024-12-04 VITALS
TEMPERATURE: 98 F | BODY MASS INDEX: 27.35 KG/M2 | OXYGEN SATURATION: 95 % | SYSTOLIC BLOOD PRESSURE: 138 MMHG | HEIGHT: 75 IN | HEART RATE: 76 BPM | RESPIRATION RATE: 16 BRPM | DIASTOLIC BLOOD PRESSURE: 85 MMHG | WEIGHT: 220 LBS

## 2024-12-04 DIAGNOSIS — S92.352P CLOSED DISPLACED FRACTURE OF FIFTH METATARSAL BONE OF LEFT FOOT WITH MALUNION: ICD-10-CM

## 2024-12-04 DIAGNOSIS — S92.352A CLOSED DISPLACED FRACTURE OF FIFTH METATARSAL BONE OF LEFT FOOT, INITIAL ENCOUNTER: Primary | ICD-10-CM

## 2024-12-04 PROCEDURE — 36000709 HC OR TIME LEV III EA ADD 15 MIN: Mod: PO | Performed by: ORTHOPAEDIC SURGERY

## 2024-12-04 PROCEDURE — 36000708 HC OR TIME LEV III 1ST 15 MIN: Mod: PO | Performed by: ORTHOPAEDIC SURGERY

## 2024-12-04 PROCEDURE — 63600175 PHARM REV CODE 636 W HCPCS: Mod: PO | Performed by: ORTHOPAEDIC SURGERY

## 2024-12-04 PROCEDURE — 27201423 OPTIME MED/SURG SUP & DEVICES STERILE SUPPLY: Mod: PO | Performed by: ORTHOPAEDIC SURGERY

## 2024-12-04 PROCEDURE — 71000033 HC RECOVERY, INTIAL HOUR: Mod: PO | Performed by: ORTHOPAEDIC SURGERY

## 2024-12-04 PROCEDURE — 37000008 HC ANESTHESIA 1ST 15 MINUTES: Mod: PO | Performed by: ORTHOPAEDIC SURGERY

## 2024-12-04 PROCEDURE — C1713 ANCHOR/SCREW BN/BN,TIS/BN: HCPCS | Mod: PO | Performed by: ORTHOPAEDIC SURGERY

## 2024-12-04 PROCEDURE — 63600175 PHARM REV CODE 636 W HCPCS: Mod: PO | Performed by: ANESTHESIOLOGY

## 2024-12-04 PROCEDURE — C1769 GUIDE WIRE: HCPCS | Mod: PO | Performed by: ORTHOPAEDIC SURGERY

## 2024-12-04 PROCEDURE — 64445 NJX AA&/STRD SCIATIC NRV IMG: CPT | Mod: PO | Performed by: ANESTHESIOLOGY

## 2024-12-04 PROCEDURE — 63600175 PHARM REV CODE 636 W HCPCS: Mod: PO | Performed by: NURSE ANESTHETIST, CERTIFIED REGISTERED

## 2024-12-04 PROCEDURE — 37000009 HC ANESTHESIA EA ADD 15 MINS: Mod: PO | Performed by: ORTHOPAEDIC SURGERY

## 2024-12-04 PROCEDURE — 27200651 HC AIRWAY, LMA: Mod: PO | Performed by: ANESTHESIOLOGY

## 2024-12-04 PROCEDURE — 27800903 OPTIME MED/SURG SUP & DEVICES OTHER IMPLANTS: Mod: PO | Performed by: ORTHOPAEDIC SURGERY

## 2024-12-04 PROCEDURE — 27200750 HC INSULATED NEEDLE/ STIMUPLEX: Mod: PO | Performed by: ANESTHESIOLOGY

## 2024-12-04 PROCEDURE — 28322 REPAIR OF METATARSALS: CPT | Mod: LT,,, | Performed by: ORTHOPAEDIC SURGERY

## 2024-12-04 PROCEDURE — C9290 INJ, BUPIVACAINE LIPOSOME: HCPCS | Mod: PO | Performed by: ANESTHESIOLOGY

## 2024-12-04 PROCEDURE — 25000003 PHARM REV CODE 250: Mod: PO | Performed by: NURSE ANESTHETIST, CERTIFIED REGISTERED

## 2024-12-04 PROCEDURE — 25000003 PHARM REV CODE 250: Mod: PO | Performed by: ORTHOPAEDIC SURGERY

## 2024-12-04 DEVICE — IMPLANTABLE DEVICE: Type: IMPLANTABLE DEVICE | Site: FOOT | Status: FUNCTIONAL

## 2024-12-04 DEVICE — SCREW BG LOCKING 2.5X12MM: Type: IMPLANTABLE DEVICE | Site: FOOT | Status: FUNCTIONAL

## 2024-12-04 DEVICE — SCREW BG LOCKING 2.5X10MM: Type: IMPLANTABLE DEVICE | Site: FOOT | Status: FUNCTIONAL

## 2024-12-04 RX ORDER — KETAMINE HCL IN 0.9 % NACL 50 MG/5 ML
SYRINGE (ML) INTRAVENOUS
Status: DISCONTINUED | OUTPATIENT
Start: 2024-12-04 | End: 2024-12-04

## 2024-12-04 RX ORDER — MUPIROCIN 20 MG/G
OINTMENT TOPICAL
Status: DISCONTINUED | OUTPATIENT
Start: 2024-12-04 | End: 2024-12-04 | Stop reason: HOSPADM

## 2024-12-04 RX ORDER — BUPIVACAINE HYDROCHLORIDE 5 MG/ML
INJECTION, SOLUTION EPIDURAL; INTRACAUDAL
Status: COMPLETED | OUTPATIENT
Start: 2024-12-04 | End: 2024-12-04

## 2024-12-04 RX ORDER — FENTANYL CITRATE 50 UG/ML
25 INJECTION, SOLUTION INTRAMUSCULAR; INTRAVENOUS EVERY 5 MIN PRN
Status: DISCONTINUED | OUTPATIENT
Start: 2024-12-04 | End: 2024-12-04 | Stop reason: HOSPADM

## 2024-12-04 RX ORDER — HYDROCODONE BITARTRATE AND ACETAMINOPHEN 10; 325 MG/1; MG/1
1 TABLET ORAL
Qty: 42 TABLET | Refills: 0 | Status: SHIPPED | OUTPATIENT
Start: 2024-12-04 | End: 2024-12-11

## 2024-12-04 RX ORDER — METHOCARBAMOL 500 MG/1
500 TABLET, FILM COATED ORAL EVERY 8 HOURS PRN
Qty: 42 TABLET | Refills: 0 | Status: SHIPPED | OUTPATIENT
Start: 2024-12-04 | End: 2024-12-18

## 2024-12-04 RX ORDER — PROPOFOL 10 MG/ML
VIAL (ML) INTRAVENOUS
Status: DISCONTINUED | OUTPATIENT
Start: 2024-12-04 | End: 2024-12-04

## 2024-12-04 RX ORDER — BUPIVACAINE 13.3 MG/ML
INJECTION, SUSPENSION, LIPOSOMAL INFILTRATION
Status: DISCONTINUED | OUTPATIENT
Start: 2024-12-04 | End: 2024-12-04

## 2024-12-04 RX ORDER — LIDOCAINE HYDROCHLORIDE 10 MG/ML
1 INJECTION, SOLUTION EPIDURAL; INFILTRATION; INTRACAUDAL; PERINEURAL ONCE
Status: DISCONTINUED | OUTPATIENT
Start: 2024-12-04 | End: 2024-12-04 | Stop reason: HOSPADM

## 2024-12-04 RX ORDER — SODIUM CHLORIDE, SODIUM LACTATE, POTASSIUM CHLORIDE, CALCIUM CHLORIDE 600; 310; 30; 20 MG/100ML; MG/100ML; MG/100ML; MG/100ML
INJECTION, SOLUTION INTRAVENOUS CONTINUOUS
Status: DISCONTINUED | OUTPATIENT
Start: 2024-12-04 | End: 2024-12-04 | Stop reason: HOSPADM

## 2024-12-04 RX ORDER — ACETAMINOPHEN 10 MG/ML
INJECTION, SOLUTION INTRAVENOUS
Status: DISCONTINUED | OUTPATIENT
Start: 2024-12-04 | End: 2024-12-04

## 2024-12-04 RX ORDER — PHENYLEPHRINE HYDROCHLORIDE 10 MG/ML
INJECTION INTRAVENOUS
Status: DISCONTINUED | OUTPATIENT
Start: 2024-12-04 | End: 2024-12-04

## 2024-12-04 RX ORDER — MIDAZOLAM HYDROCHLORIDE 1 MG/ML
0.5 INJECTION, SOLUTION INTRAMUSCULAR; INTRAVENOUS
Status: DISCONTINUED | OUTPATIENT
Start: 2024-12-04 | End: 2024-12-04 | Stop reason: HOSPADM

## 2024-12-04 RX ORDER — ONDANSETRON HYDROCHLORIDE 8 MG/1
8 TABLET, FILM COATED ORAL EVERY 12 HOURS PRN
Qty: 20 TABLET | Refills: 0 | Status: SHIPPED | OUTPATIENT
Start: 2024-12-04 | End: 2024-12-14

## 2024-12-04 RX ORDER — DEXAMETHASONE SODIUM PHOSPHATE 4 MG/ML
8 INJECTION, SOLUTION INTRA-ARTICULAR; INTRALESIONAL; INTRAMUSCULAR; INTRAVENOUS; SOFT TISSUE
Status: COMPLETED | OUTPATIENT
Start: 2024-12-04 | End: 2024-12-04

## 2024-12-04 RX ORDER — ASPIRIN 81 MG/1
81 TABLET ORAL DAILY
Qty: 90 TABLET | Refills: 0 | Status: SHIPPED | OUTPATIENT
Start: 2024-12-04 | End: 2025-03-04

## 2024-12-04 RX ORDER — FENTANYL CITRATE 50 UG/ML
INJECTION, SOLUTION INTRAMUSCULAR; INTRAVENOUS
Status: DISCONTINUED | OUTPATIENT
Start: 2024-12-04 | End: 2024-12-04

## 2024-12-04 RX ORDER — CEFAZOLIN SODIUM 1 G/3ML
2 INJECTION, POWDER, FOR SOLUTION INTRAMUSCULAR; INTRAVENOUS
Status: COMPLETED | OUTPATIENT
Start: 2024-12-04 | End: 2024-12-04

## 2024-12-04 RX ORDER — ONDANSETRON HYDROCHLORIDE 2 MG/ML
INJECTION, SOLUTION INTRAVENOUS
Status: DISCONTINUED | OUTPATIENT
Start: 2024-12-04 | End: 2024-12-04

## 2024-12-04 RX ORDER — LIDOCAINE HYDROCHLORIDE 20 MG/ML
INJECTION INTRAVENOUS
Status: DISCONTINUED | OUTPATIENT
Start: 2024-12-04 | End: 2024-12-04

## 2024-12-04 RX ADMIN — BUPIVACAINE 6 ML: 13.3 INJECTION, SUSPENSION, LIPOSOMAL INFILTRATION at 06:12

## 2024-12-04 RX ADMIN — Medication 20 MG: at 07:12

## 2024-12-04 RX ADMIN — PROPOFOL 150 MG: 10 INJECTION, EMULSION INTRAVENOUS at 07:12

## 2024-12-04 RX ADMIN — ONDANSETRON 8 MG: 2 INJECTION, SOLUTION INTRAMUSCULAR; INTRAVENOUS at 07:12

## 2024-12-04 RX ADMIN — PHENYLEPHRINE HYDROCHLORIDE 100 MCG: 10 INJECTION INTRAVENOUS at 07:12

## 2024-12-04 RX ADMIN — FENTANYL CITRATE 50 MCG: 50 INJECTION, SOLUTION INTRAMUSCULAR; INTRAVENOUS at 07:12

## 2024-12-04 RX ADMIN — FENTANYL CITRATE 100 MCG: 50 INJECTION INTRAMUSCULAR; INTRAVENOUS at 06:12

## 2024-12-04 RX ADMIN — BUPIVACAINE HYDROCHLORIDE 6 ML: 5 INJECTION, SOLUTION EPIDURAL; INTRACAUDAL at 06:12

## 2024-12-04 RX ADMIN — LIDOCAINE HYDROCHLORIDE 75 MG: 20 INJECTION INTRAVENOUS at 07:12

## 2024-12-04 RX ADMIN — PROPOFOL 50 MG: 10 INJECTION, EMULSION INTRAVENOUS at 07:12

## 2024-12-04 RX ADMIN — MIDAZOLAM 2 MG: 1 INJECTION INTRAMUSCULAR; INTRAVENOUS at 06:12

## 2024-12-04 RX ADMIN — SODIUM CHLORIDE, POTASSIUM CHLORIDE, SODIUM LACTATE AND CALCIUM CHLORIDE: 600; 310; 30; 20 INJECTION, SOLUTION INTRAVENOUS at 06:12

## 2024-12-04 RX ADMIN — MUPIROCIN: 20 OINTMENT TOPICAL at 06:12

## 2024-12-04 RX ADMIN — DEXAMETHASONE SODIUM PHOSPHATE 8 MG: 4 INJECTION INTRA-ARTICULAR; INTRALESIONAL; INTRAMUSCULAR; INTRAVENOUS; SOFT TISSUE at 06:12

## 2024-12-04 RX ADMIN — BUPIVACAINE 14 ML: 13.3 INJECTION, SUSPENSION, LIPOSOMAL INFILTRATION at 06:12

## 2024-12-04 RX ADMIN — CEFAZOLIN 2 G: 330 INJECTION, POWDER, FOR SOLUTION INTRAMUSCULAR; INTRAVENOUS at 06:12

## 2024-12-04 RX ADMIN — GLYCOPYRROLATE 0.2 MG: 0.2 INJECTION, SOLUTION INTRAMUSCULAR; INTRAVENOUS at 07:12

## 2024-12-04 RX ADMIN — ACETAMINOPHEN 1000 MG: 10 INJECTION INTRAVENOUS at 07:12

## 2024-12-04 RX ADMIN — BUPIVACAINE HYDROCHLORIDE 14 ML: 5 INJECTION, SOLUTION EPIDURAL; INTRACAUDAL; PERINEURAL at 06:12

## 2024-12-04 NOTE — DISCHARGE SUMMARY
[facility] - Surgery  Discharge Note  Short Stay    Procedure(s) (LRB):  ORIF, FRACTURE, METATARSAL BONE (Left)      OUTCOME: Patient tolerated treatment/procedure well without complication and is now ready for discharge.    DISPOSITION: Home or Self Care    FINAL DIAGNOSIS:  Left fifth metatarsal fracture malunion.    FOLLOWUP: In clinic    DISCHARGE INSTRUCTIONS:  No discharge procedures on file.     TIME SPENT ON DISCHARGE: 15 minutes

## 2024-12-04 NOTE — ANESTHESIA PROCEDURE NOTES
Peripheral Block    Patient location during procedure: pre-op   Block not for primary anesthetic.  Reason for block: at surgeon's request and post-op pain management   Post-op Pain Location: left foot   Start time: 12/4/2024 6:40 AM  Timeout: 12/4/2024 6:40 AM   End time: 12/4/2024 6:45 AM    Staffing  Authorizing Provider: Sanya Tang MD  Performing Provider: Sanya Tang MD    Staffing  Performed by: Sanya Tang MD  Authorized by: Sanya Tang MD    Preanesthetic Checklist  Completed: patient identified, IV checked, site marked, risks and benefits discussed, surgical consent, monitors and equipment checked, pre-op evaluation and timeout performed  Peripheral Block  Patient position: supine  Prep: ChloraPrep  Patient monitoring: heart rate, cardiac monitor, continuous pulse ox, continuous capnometry and frequent blood pressure checks  Block type: popliteal  Laterality: left  Injection technique: single shot  Needle  Needle type: Stimuplex   Needle gauge: 21 G  Needle length: 4 in  Needle localization: anatomical landmarks and ultrasound guidance   -ultrasound image captured on disc.  Assessment  Injection assessment: negative aspiration, negative parasthesia and local visualized surrounding nerve  Paresthesia pain: none  Heart rate change: no  Slow fractionated injection: yes  Pain Tolerance: comfortable throughout block and no complaints  Medications:    Medications: bupivacaine (pf) (MARCAINE) injection 0.5% - Perineural   14 mL - 12/4/2024 6:45:00 AM    Additional Notes  VSS.  [facility] RN monitoring vitals throughout procedure.  Patient tolerated procedure well.

## 2024-12-04 NOTE — BRIEF OP NOTE
[facility] - Surgery  Brief Operative Note    SUMMARY     Surgery Date: 12/4/2024     Surgeons and Role:     * Milo Ramirez MD - Primary    Assisting Surgeon: None    Pre-op Diagnosis:  Closed displaced fracture of fifth metatarsal bone of left foot, initial encounter [S92.352A]    Post-op Diagnosis: Post-Op Diagnosis Codes:     * Closed displaced fracture of fifth metatarsal bone of left foot, initial encounter [S92.352A]      Procedure(s) (LRB):  ORIF, FRACTURE, METATARSAL BONE (Left)      Operative Findings:   Moderate callus formation with butterfly fragment medially.   Comminution after debridement.    [place] mini-fragment T-plate.      Estimated Blood Loss: <5mL.         Specimens:   Specimen (24h ago, onward)      None

## 2024-12-04 NOTE — ANESTHESIA PREPROCEDURE EVALUATION
12/04/2024  Osbaldo Brewer is a 63 y.o., male.      Pre-op Assessment    I have reviewed the Patient Summary Reports.     I have reviewed the Nursing Notes. I have reviewed the NPO Status.   I have reviewed the Medications.     Review of Systems  Anesthesia Hx:  No problems with previous Anesthesia                Social:  Non-Smoker       Hematology/Oncology:  Hematology Normal   Oncology Normal                                   EENT/Dental:  EENT/Dental Normal           Cardiovascular:                hyperlipidemia                               Pulmonary:  Pulmonary Normal                       Renal/:    BPH              Musculoskeletal:     Closed fracture of left fifth metatarsal bone             Neurological:  Neurology Normal                                      Endocrine:   Hypothyroidism       Hypothyroidism          Psych:  Psychiatric History anxiety depression                Physical Exam  General: Well nourished, Cooperative, Alert and Oriented    Airway:  Mallampati: II   Mouth Opening: Normal  TM Distance: Normal  Tongue: Normal  Neck ROM: Normal ROM    Dental:  Intact    Chest/Lungs:  Normal Respiratory Rate    Heart:  Rate: Normal        Anesthesia Plan  Type of Anesthesia, risks & benefits discussed:    Anesthesia Type: Gen Supraglottic Airway  Intra-op Monitoring Plan: Standard ASA Monitors  Post Op Pain Control Plan: multimodal analgesia, IV/PO Opioids PRN and peripheral nerve block  Induction:  IV  Airway Plan: , Post-Induction  Informed Consent: Informed consent signed with the Patient and all parties understand the risks and agree with anesthesia plan.  All questions answered.   ASA Score: 2  Day of Surgery Review of History & Physical: H&P Update referred to the surgeon/provider.    Ready For Surgery From Anesthesia Perspective.     .

## 2024-12-04 NOTE — TELEPHONE ENCOUNTER
----- Message from Christoph sent at 12/4/2024  1:44 PM CST -----  Type:  Patient Returning Call    Who Called:PT  Who Left Message for Patient:  Does the patient know what this is regarding?:AFTER CARE  Would the patient rather a call back or a response via [facility]? CALL  Best Call Back Number: [phone]  Additional Information: Pts wife states she would like a call back from office to see if pts leg should be elevated or not. Thank you

## 2024-12-04 NOTE — TRANSFER OF CARE
"Anesthesia Transfer of Care Note    Patient: Osbaldo Brewer    Procedure(s) Performed: Procedure(s) (LRB):  ORIF, FRACTURE, METATARSAL BONE (Left)    Patient location: PACU    Anesthesia Type: general    Transport from OR: Transported from OR on room air with adequate spontaneous ventilation    Post pain: adequate analgesia    Post assessment: no apparent anesthetic complications and tolerated procedure well    Post vital signs: stable    Level of consciousness: lethargic    Nausea/Vomiting: no nausea/vomiting    Complications: none    Transfer of care protocol was followed      Last vitals: Visit Vitals  /71   Pulse 73   Temp 36.3 °C (97.4 °F) (Skin)   Resp 16   Ht 6' 3" (1.905 m)   Wt 99.8 kg (220 lb)   SpO2 99%   BMI 27.50 kg/m²     "

## 2024-12-04 NOTE — ANESTHESIA PROCEDURE NOTES
Peripheral Block    Patient location during procedure: pre-op   Block not for primary anesthetic.  Reason for block: at surgeon's request and post-op pain management   Post-op Pain Location: left foot   Start time: 12/4/2024 6:46 AM  Timeout: 12/4/2024 6:46 AM   End time: 12/4/2024 6:50 AM    Staffing  Authorizing Provider: Sanya Tang MD  Performing Provider: Sanya Tang MD    Staffing  Performed by: Sanya Tang MD  Authorized by: Sanya Tang MD    Preanesthetic Checklist  Completed: patient identified, IV checked, site marked, risks and benefits discussed, surgical consent, monitors and equipment checked, pre-op evaluation and timeout performed  Peripheral Block  Patient position: supine  Prep: ChloraPrep  Patient monitoring: heart rate, cardiac monitor, continuous pulse ox, continuous capnometry and frequent blood pressure checks  Block type: adductor canal  Laterality: left  Injection technique: single shot  Needle  Needle type: Stimuplex   Needle gauge: 21 G  Needle length: 4 in  Needle localization: anatomical landmarks and ultrasound guidance   -ultrasound image captured on disc.  Assessment  Injection assessment: negative aspiration, negative parasthesia and local visualized surrounding nerve  Paresthesia pain: none  Heart rate change: no  Slow fractionated injection: yes  Pain Tolerance: comfortable throughout block and no complaints  Medications:    Medications: bupivacaine (pf) (MARCAINE) injection 0.5% - Perineural   6 mL - 12/4/2024 6:50:00 AM    Additional Notes  VSS.  [facility] RN monitoring vitals throughout procedure.  Patient tolerated procedure well.

## 2024-12-04 NOTE — ANESTHESIA PROCEDURE NOTES
Intubation    Date/Time: 12/4/2024 7:08 AM    Performed by: Zoya Echeverria CRNA  Authorized by: Sanya Tang MD    Intubation:     Induction:  Intravenous    Intubated:  Postinduction    Mask Ventilation:  Easy with oral airway    Attempts:  1    Attempted By:  CRNA    Method of Intubation:  Other (see comments)    Difficult Airway Encountered?: No      Complications:  None    Airway Device:  Supraglottic airway/LMA    Airway Device Size:  4.0    Style/Cuff Inflation:  Cuffed    Inflation Amount (mL):  20    Secured at:  The lips    Placement Verified By:  Capnometry    Complicating Factors:  Anterior larynx, small mouth and poor neck/head extension    Findings Post-Intubation:  BS equal bilateral

## 2024-12-04 NOTE — ANESTHESIA POSTPROCEDURE EVALUATION
Anesthesia Post Evaluation    Patient: Osbaldo Brewer    Procedure(s) Performed: Procedure(s) (LRB):  ORIF, FRACTURE, METATARSAL BONE (Left)    Final Anesthesia Type: general      Patient location during evaluation: PACU  Patient participation: Yes- Able to Participate  Level of consciousness: awake and alert  Post-procedure vital signs: reviewed and stable  Pain management: adequate  Airway patency: patent    PONV status at discharge: No PONV  Anesthetic complications: no      Cardiovascular status: hemodynamically stable  Respiratory status: unassisted and room air  Hydration status: euvolemic  Follow-up not needed.              Vitals Value Taken Time   /85 12/04/24 1015   Temp 36.6 °C (97.8 °F) 12/04/24 0945   Pulse 76 12/04/24 1015   Resp 16 12/04/24 1015   SpO2 95 % 12/04/24 1015         No case tracking events are documented in the log.      Pain/Tamara Score: Pain Rating Prior to Med Admin: 3 (12/4/2024  6:44 AM)  Pain Rating Post Med Admin: 2 (12/4/2024  6:55 AM)  Tamara Score: 9 (12/4/2024 10:00 AM)

## 2024-12-04 NOTE — OR NURSING
Left Popliteal & Left Saphenous single shot blocks complete per Dr. Tang with Anesthesia. Exparel band placed to pt's wrist. Pt tolerated well. See Anesthesia record for procedural notes. See flowsheet and MAR for vitals and medications. Monitors in place, alarms audible. VSS. etCO2 monitoring in place. Resp even, unlabored. Skin warm, dry. Pt in NAD. Pt awaiting transport to OR. Family at bedside. Bed in lowest, locked position. Side rails up x2. Call light within reach.

## 2024-12-05 NOTE — OP NOTE
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     Procedures:  Leftt 5th metatarsal malunion takedown and open reduction internal fixation.     Surgeon: Miol Ramirez MD     Assist: TOM Gupta.     Anesthesia: General with regional block.     Estimated Blood Loss: <5mL.     Drains: None.     Findings:   Moderate callus formation with butterfly fragment medially.   Comminution after debridement.      Implants:   [employer] mini-fragment T-plate. 2.0mm locking and non-locking screws x 5. 1cc bio4 allograft putty.     Operative Indication:  Osbaldo Brewer is a 63 year old male with a history significant for a syncopal episode sustained in April 2024. At that time he was noted to have an acute left forefoot injury with a displaced 5th metatarsal shaft fracture as seen on x-ray. I recommended acute ORIF however patient unable to remain off of work as a  for an extensive period of time. Patient opted for nonsurgical management at that time. He has had persistent pain and swelling despite exhausting conservative treatment measures up to this point.  Operative versus non operative treatment options were discussed.  Specifically discussed that overall alignment, specifically in the sagittal plane, is relatively well-maintained.  Risks and benefits were described.  These include but are not limited to bleeding; infection; damage to surrounding nerves or vessels; persistent pain, stiffness; nonunion; malunion; need for additional procedures; amputation; blood clots; pulmonary embolus; cardiac events; stroke; and the general risks of anesthesia including anesthetic death.   We also reviewed postop protocol as well as limitations and expectations. Patient understands and desires to proceed with surgical intervention. Consent  was obtained and the left foot was marked.     Operative Procedure:  The patient was transferred to the operating room, transferred to the OR table in a supine position then placed under general endotracheal anesthesia by the anesthesiology service. All bony prominences were appropriately padded.  Patient was secured to the table.  An SCD was placed on the contralateral lower limb.  A nonsterile tourniquet was placed on the left thigh.  The patient was prepped and draped in usual sterile fashion.  A time-out was then called.  The patient, procedure, surgical site was verified and everyone was in agreement.  Preoperative IV antibiotics were administered. The surgical extremity was exsanguinated with an esmarch bandage and the tourniquet was inflated to 250 mm of mercury.  A #15 blade was used to make an approximately 4 cm incision over the dorsal lateral margin of the 5th metatarsal.  Subcutaneous vessels were cauterized.  Dissection was taken down deep to the level of the fracture site.  Significant callus formation was appreciated.  Extensive debridement was performed to allow for adequate visualization of the fracture.    A 2.5 mm cannulated drill bit was used to ream the proximal portion of the intramedullary canal.  A bone tenaculum was used to adequately reduce the comminuted fracture fragments.  A 2.0 mm screw was placed from lateral to medial in a lag by technique fashion to capture the dorsal medial based butterfly fragment.  A small T-plate was chosen and provisionally pinned.  This was used in a bridging type fashion.  Locking screws were placed distally followed by locking and nonlocking screws proximally.  0.5 cc of DBM bone putty were utilized for the small area of gapping along the plantar lateral margin of the fracture after malunion takedown had been performed.  Overall satisfactory fracture reduction and plate placement were confirmed both clinically and under fluoro.  The tourniquet was let down  and good hemostasis was obtained.  The wound was copiously irrigated and closed in standard layered fashion including 0 Vicryl deep, 3-0 Monocryl subcuticularly, and 3-0 nylon on the skin.  Sterile dressings including Xeroform, 4 x 4 gauze, Webril, and a well-padded short leg splint in neutral dorsiflexion.  Patient was reversed from anesthesia and transferred to recovery in stable condition.        Milo Ramirez MD

## 2024-12-12 DIAGNOSIS — S92.352A CLOSED DISPLACED FRACTURE OF FIFTH METATARSAL BONE OF LEFT FOOT, INITIAL ENCOUNTER: Primary | ICD-10-CM

## 2024-12-17 ENCOUNTER — HOSPITAL ENCOUNTER (OUTPATIENT)
Dept: RADIOLOGY | Facility: HOSPITAL | Age: 64
Discharge: HOME | End: 2024-12-17
Attending: ORTHOPAEDIC SURGERY
Payer: MEDICARE

## 2024-12-17 ENCOUNTER — OFFICE VISIT (OUTPATIENT)
Dept: ORTHOPEDICS | Facility: CLINIC | Age: 64
End: 2024-12-17
Payer: MEDICARE

## 2024-12-17 DIAGNOSIS — S92.352A CLOSED DISPLACED FRACTURE OF FIFTH METATARSAL BONE OF LEFT FOOT, INITIAL ENCOUNTER: Primary | ICD-10-CM

## 2024-12-17 DIAGNOSIS — S92.352A CLOSED DISPLACED FRACTURE OF FIFTH METATARSAL BONE OF LEFT FOOT, INITIAL ENCOUNTER: ICD-10-CM

## 2024-12-17 PROCEDURE — 73630 X-RAY EXAM OF FOOT: CPT | Mod: TC,PO,LT

## 2024-12-17 PROCEDURE — 73630 X-RAY EXAM OF FOOT: CPT | Mod: 26,LT,, | Performed by: RADIOLOGY

## 2024-12-17 PROCEDURE — 99213 OFFICE O/P EST LOW 20 MIN: CPT | Mod: PBBFAC,25,PO | Performed by: ORTHOPAEDIC SURGERY

## 2024-12-17 PROCEDURE — 99999 PR PBB SHADOW E&M-EST. PATIENT-LVL III: CPT | Mod: PBBFAC,,, | Performed by: ORTHOPAEDIC SURGERY

## 2024-12-17 PROCEDURE — 99024 POSTOP FOLLOW-UP VISIT: CPT | Mod: POP,,, | Performed by: ORTHOPAEDIC SURGERY

## 2024-12-17 NOTE — PROGRESS NOTES
Status/Diagnosis: Displaced Left 5th MT diaphyseal fracture.  Date of Surgery:   12/04/2024: Left 5th MT nonunion takedown & ORIF.  Date of Injury: 04/10/2024  Return visit: 01/02/25  X-rays on Return: NWB 3-views Left foot XOP      Present History:  Patient presents today via referral from No ref. provider found   Osbaldo Brewer is a 63 y.o. male with acute onset left lateral forefoot pain.  Patient actually endorses a syncopal episode that he relates to be dehydrated on 04/10/2024.  Immediate pain and swelling about the left foot.  X-rays taken at the emergency department were consistent with fracture.  He was placed into a splint with instructions for outpatient orthopedic follow-up.  Denies any prior syncopal episodes.  No foot pain or instability prior to the above.    Pain currently 0/10.  Denies any numbness or tingling.    Denies tobacco use.  Works as a .    POSTOP:  Patient doing well postop.  4/10 pain.  For the most part does not require narcotic pain medication.  He is taking aspirin daily as prescribed for DVT prophylaxis.    Reports compliance with nonweightbearing status.  Denies any drainage, fever, chills.      Past Medical History:   Diagnosis Date    Basal cell carcinoma     BPH associated with nocturia 01/11/2023    Erectile dysfunction 01/11/2023    History of Helicobacter pylori infection 01/11/2023    History of sinus surgery 01/11/2023    Mixed hyperlipidemia 08/29/2014       Past Surgical History:   Procedure Laterality Date    FUNCTIONAL ENDOSCOPIC SINUS SURGERY (FESS) USING COMPUTER-ASSISTED NAVIGATION Bilateral 3/31/2023    Procedure: SINUS SURGERY FUNCTIONAL ENDOSCOPIC WITH NAVIGATION;  Surgeon: Neville Navarrete MD;  Location: [facility] OR;  Service: ENT;  Laterality: Bilateral;    NASAL SEPTOPLASTY Bilateral 3/31/2023    Procedure: SEPTOPLASTY;  Surgeon: Neville Navarrete MD;  Location: [facility] OR;  Service: ENT;  Laterality: Bilateral;    OPEN REDUCTION AND INTERNAL  FIXATION (ORIF) OF FRACTURE OF METATARSAL BONE Left 12/4/2024    Procedure: ORIF, FRACTURE, METATARSAL BONE;  Surgeon: Milo Ramirez MD;  Location: [facility];  Service: Orthopedics;  Laterality: Left;  5TH METATARSAL NONUNION TAKEDOWN    SINUS SURGERY      WISDOM TOOTH EXTRACTION      states woke up during procedure       Current Outpatient Medications   Medication Sig    aspirin (ECOTRIN) 81 MG EC tablet Take 1 tablet (81 mg total) by mouth once daily.    atorvastatin (LIPITOR) 40 MG tablet Take 1 tablet (40 mg total) by mouth once daily.    azelastine (ASTELIN) 137 mcg (0.1 %) nasal spray 2 sprays (274 mcg total) by Nasal route 2 (two) times daily.    COQ10, UBIQUINOL, ORAL Take by mouth.    famotidine (PEPCID) 40 MG tablet Take 1 tablet (40 mg total) by mouth 2 (two) times daily.    fluorouraciL (EFUDEX) 5 % cream AAA face bid x 2-4 weeks    ipratropium (ATROVENT) 42 mcg (0.06 %) nasal spray 2 sprays by Each Nostril route 3 (three) times daily as needed (runny nose (rhinitis)).    ketoconazole (NIZORAL) 2 % cream Apply twice a day to affected areas of skin    methocarbamoL (ROBAXIN) 500 MG Tab Take 1 tablet (500 mg total) by mouth every 8 (eight) hours as needed (muscle spasms).    traZODone (DESYREL) 150 MG tablet 1/3-1 tab PO qhs PRN insomnia     No current facility-administered medications for this visit.       Review of patient's allergies indicates:   Allergen Reactions    Oxycodone-acetaminophen Rash       No family history on file.    Social History     Socioeconomic History    Marital status:    Tobacco Use    Smoking status: Never    Smokeless tobacco: Never   Substance and Sexual Activity    Alcohol use: Yes     Comment: occ    Drug use: Never     Social Drivers of Health     Physical Activity: Unknown (6/21/2024)    Exercise Vital Sign     Days of Exercise per Week: 0 days     Minutes of Exercise per Session: Patient declined   Stress: Stress Concern Present (6/21/2024)    [employer] - Occupational Stress Questionnaire     Feeling of Stress : To some extent       Physical exam:  There were no vitals filed for this visit.  There is no height or weight on file to calculate BMI.  General: In no apparent distress; well developed and well nourished.  HEENT: normocephalic; atraumatic.  Cardiovascular: regular rate.  Respiratory: no increased work of breathing.  Musculoskeletal:   Gait: did not assess  Inspection:   Surgical site well healed with nylon sutures in place.  Mild residual swelling to be expected about the lateral forefoot.  No warmth or erythema.  No signs or symptoms of infection.  Gross motor and function intact.  Some altered sensation about the incision to be expected.  Brisk cap refill less than 2 seconds all 5 digits.       Imaging Studies/Outside documentation:  I have ordered/reviewed/interpreted the following images/outside documentation:  1. NWB 3-views of Left foot:  Patient is status post left 5th metatarsal shaft nonunion takedown and ORIF.  Overall alignment well-maintained.  No significant callus formation or bony bridging present.  No evidence of implant loosening or failure.        Assessment:  Osbaldo Brewer is a 63 y.o. male with Displaced Left 5th MT diaphyseal fracture.  Left ankle deltoid injury; peroneal tendon strain.     Plan:   Clinical and radiographic findings were discussed.   Sutures removed and Steri-Strips placed.  Patient to be placed into a well-padded short-leg fiberglass cast today.    He is to remain strict nonweightbearing left lower extremity.    Continue elevation above the level of the heart and aspirin daily for DVT prophylaxis.    Return to clinic on 01/02 for repeat evaluation, wound check, and x-ray.    Patient states that he should be covered for a new boot with his insurance that went into affect the beginning of this month.          This note was created using voice recognition software and may contain  grammatical errors.

## 2024-12-27 DIAGNOSIS — S92.352A CLOSED DISPLACED FRACTURE OF FIFTH METATARSAL BONE OF LEFT FOOT, INITIAL ENCOUNTER: Primary | ICD-10-CM

## 2025-01-02 ENCOUNTER — HOSPITAL ENCOUNTER (OUTPATIENT)
Dept: RADIOLOGY | Facility: HOSPITAL | Age: 65
Discharge: HOME | End: 2025-01-02
Attending: ORTHOPAEDIC SURGERY
Payer: MEDICARE

## 2025-01-02 ENCOUNTER — OFFICE VISIT (OUTPATIENT)
Dept: ORTHOPEDICS | Facility: CLINIC | Age: 65
End: 2025-01-02
Payer: MEDICARE

## 2025-01-02 VITALS — HEIGHT: 75 IN | WEIGHT: 220 LBS | BODY MASS INDEX: 27.35 KG/M2

## 2025-01-02 DIAGNOSIS — S92.352A CLOSED DISPLACED FRACTURE OF FIFTH METATARSAL BONE OF LEFT FOOT, INITIAL ENCOUNTER: Primary | ICD-10-CM

## 2025-01-02 DIAGNOSIS — S92.352A CLOSED DISPLACED FRACTURE OF FIFTH METATARSAL BONE OF LEFT FOOT, INITIAL ENCOUNTER: ICD-10-CM

## 2025-01-02 PROCEDURE — 73630 X-RAY EXAM OF FOOT: CPT | Mod: 26,LT,, | Performed by: RADIOLOGY

## 2025-01-02 PROCEDURE — 99212 OFFICE O/P EST SF 10 MIN: CPT | Mod: PBBFAC,25,PO | Performed by: ORTHOPAEDIC SURGERY

## 2025-01-02 PROCEDURE — 99999 PR PBB SHADOW E&M-EST. PATIENT-LVL II: CPT | Mod: PBBFAC,,, | Performed by: ORTHOPAEDIC SURGERY

## 2025-01-02 PROCEDURE — 99024 POSTOP FOLLOW-UP VISIT: CPT | Mod: POP,,, | Performed by: ORTHOPAEDIC SURGERY

## 2025-01-02 PROCEDURE — 73630 X-RAY EXAM OF FOOT: CPT | Mod: TC,PO,LT

## 2025-01-02 NOTE — PROGRESS NOTES
Status/Diagnosis: Displaced Left 5th MT diaphyseal fracture.  Date of Surgery:   12/04/2024: Left 5th MT nonunion takedown & ORIF.  Date of Injury: 04/10/2024  Return visit: 2 weeks  X-rays on Return: NWB 3-views Left foot      Present History:  Patient presents today via referral from No ref. provider found   Osbaldo Brewer is a 64 y.o. male with acute onset left lateral forefoot pain.  Patient actually endorses a syncopal episode that he relates to be dehydrated on 04/10/2024.  Immediate pain and swelling about the left foot.  X-rays taken at the emergency department were consistent with fracture.  He was placed into a splint with instructions for outpatient orthopedic follow-up.  Denies any prior syncopal episodes.  No foot pain or instability prior to the above.    Pain currently 0/10.  Denies any numbness or tingling.    Denies tobacco use.  Works as a .    POSTOP:  Patient doing well postop.  3/10 pain.  Reports compliance with nonweightbearing status.  Taking aspirin as prescribed for DVT prophylaxis. Denies any drainage, fever, chills.      Past Medical History:   Diagnosis Date    Basal cell carcinoma     BPH associated with nocturia 01/11/2023    Erectile dysfunction 01/11/2023    History of Helicobacter pylori infection 01/11/2023    History of sinus surgery 01/11/2023    Mixed hyperlipidemia 08/29/2014       Past Surgical History:   Procedure Laterality Date    FUNCTIONAL ENDOSCOPIC SINUS SURGERY (FESS) USING COMPUTER-ASSISTED NAVIGATION Bilateral 3/31/2023    Procedure: SINUS SURGERY FUNCTIONAL ENDOSCOPIC WITH NAVIGATION;  Surgeon: Neville Navarrete MD;  Location: [facility] OR;  Service: ENT;  Laterality: Bilateral;    NASAL SEPTOPLASTY Bilateral 3/31/2023    Procedure: SEPTOPLASTY;  Surgeon: Neville Navarrete MD;  Location: [facility] OR;  Service: ENT;  Laterality: Bilateral;    OPEN REDUCTION AND INTERNAL FIXATION (ORIF) OF FRACTURE OF METATARSAL BONE Left 12/4/2024    Procedure: ORIF,  FRACTURE, METATARSAL BONE;  Surgeon: iMlo Ramirez MD;  Location: [facility];  Service: Orthopedics;  Laterality: Left;  5TH METATARSAL NONUNION TAKEDOWN    SINUS SURGERY      WISDOM TOOTH EXTRACTION      states woke up during procedure       Current Outpatient Medications   Medication Sig    aspirin (ECOTRIN) 81 MG EC tablet Take 1 tablet (81 mg total) by mouth once daily.    atorvastatin (LIPITOR) 40 MG tablet Take 1 tablet (40 mg total) by mouth once daily.    azelastine (ASTELIN) 137 mcg (0.1 %) nasal spray 2 sprays (274 mcg total) by Nasal route 2 (two) times daily.    COQ10, UBIQUINOL, ORAL Take by mouth.    famotidine (PEPCID) 40 MG tablet Take 1 tablet (40 mg total) by mouth 2 (two) times daily.    fluorouraciL (EFUDEX) 5 % cream AAA face bid x 2-4 weeks    ipratropium (ATROVENT) 42 mcg (0.06 %) nasal spray 2 sprays by Each Nostril route 3 (three) times daily as needed (runny nose (rhinitis)).    ketoconazole (NIZORAL) 2 % cream Apply twice a day to affected areas of skin    traZODone (DESYREL) 150 MG tablet 1/3-1 tab PO qhs PRN insomnia     No current facility-administered medications for this visit.       Review of patient's allergies indicates:   Allergen Reactions    Oxycodone-acetaminophen Rash       No family history on file.    Social History     Socioeconomic History    Marital status:    Tobacco Use    Smoking status: Never    Smokeless tobacco: Never   Substance and Sexual Activity    Alcohol use: Yes     Comment: occ    Drug use: Never     Social Drivers of Health     Physical Activity: Unknown (6/21/2024)    Exercise Vital Sign     Days of Exercise per Week: 0 days     Minutes of Exercise per Session: Patient declined   Stress: Stress Concern Present (6/21/2024)    [employer] - Occupational Stress Questionnaire     Feeling of Stress : To some extent       Physical exam:  There were no vitals filed for this visit.  There is no height or weight on file to  calculate BMI.  General: In no apparent distress; well developed and well nourished.  HEENT: normocephalic; atraumatic.  Cardiovascular: regular rate.  Respiratory: no increased work of breathing.  Musculoskeletal:   Gait: did not assess  Inspection:   Surgical site with well healed scar.  Minimal residual swelling.  No warmth or erythema.  No signs or symptoms of infection.  Gross motor and function intact.  Some altered sensation about the incision to be expected.  Brisk cap refill less than 2 seconds all 5 digits.       Imaging Studies/Outside documentation:  I have ordered/reviewed/interpreted the following images/outside documentation:  1. NWB 3-views of Left foot:  Patient is status post left 5th metatarsal shaft nonunion takedown and ORIF.  Overall alignment well-maintained.  Questionable early callus formation noted on oblique view.  No evidence of implant loosening or failure.        Assessment:  Osbaldo Brewer is a 64 y.o. male with Displaced Left 5th MT diaphyseal fracture.  Left ankle deltoid injury; peroneal tendon strain.     Plan:   Clinical and radiographic findings were discussed.   We will allow patient to transition out of the cast and into a short cam boot.  Okay for some weight-bearing.    Return to clinic in 2 weeks for repeat evaluation and x-ray.  Patient voiced understanding.  All questions were answered.    We performed a custom orthotic/brace fitting, adjusting and training with the patient. The patient demonstrated understanding and proper care. This was performed for 15 minutes.    This note was created using voice recognition software and may contain grammatical errors.

## 2025-01-09 DIAGNOSIS — S92.352A CLOSED DISPLACED FRACTURE OF FIFTH METATARSAL BONE OF LEFT FOOT, INITIAL ENCOUNTER: Primary | ICD-10-CM

## 2025-01-16 ENCOUNTER — HOSPITAL ENCOUNTER (OUTPATIENT)
Dept: RADIOLOGY | Facility: HOSPITAL | Age: 65
Discharge: HOME | End: 2025-01-16
Attending: ORTHOPAEDIC SURGERY
Payer: MEDICARE

## 2025-01-16 ENCOUNTER — OFFICE VISIT (OUTPATIENT)
Dept: ORTHOPEDICS | Facility: CLINIC | Age: 65
End: 2025-01-16
Payer: MEDICARE

## 2025-01-16 DIAGNOSIS — S92.352A CLOSED DISPLACED FRACTURE OF FIFTH METATARSAL BONE OF LEFT FOOT, INITIAL ENCOUNTER: ICD-10-CM

## 2025-01-16 DIAGNOSIS — S92.352A CLOSED DISPLACED FRACTURE OF FIFTH METATARSAL BONE OF LEFT FOOT, INITIAL ENCOUNTER: Primary | ICD-10-CM

## 2025-01-16 PROCEDURE — 73630 X-RAY EXAM OF FOOT: CPT | Mod: TC,PO,LT

## 2025-01-16 PROCEDURE — 99212 OFFICE O/P EST SF 10 MIN: CPT | Mod: PBBFAC,25,PO | Performed by: ORTHOPAEDIC SURGERY

## 2025-01-16 PROCEDURE — 73630 X-RAY EXAM OF FOOT: CPT | Mod: 26,LT,, | Performed by: RADIOLOGY

## 2025-01-16 PROCEDURE — 99999 PR PBB SHADOW E&M-EST. PATIENT-LVL II: CPT | Mod: PBBFAC,,, | Performed by: ORTHOPAEDIC SURGERY

## 2025-01-16 PROCEDURE — 99024 POSTOP FOLLOW-UP VISIT: CPT | Mod: POP,,, | Performed by: ORTHOPAEDIC SURGERY

## 2025-01-16 NOTE — PROGRESS NOTES
Status/Diagnosis: Displaced Left 5th MT diaphyseal fracture.  Date of Surgery:   12/04/2024: Left 5th MT nonunion takedown & ORIF.  Date of Injury: 04/10/2024  Return visit: 1 month  X-rays on Return: WB 3-views Left foot      Present History:  Patient presents today via referral from No ref. provider found   Osbaldo Brewer is a 64 y.o. male with acute onset left lateral forefoot pain.  Patient actually endorses a syncopal episode that he relates to be dehydrated on 04/10/2024.  Immediate pain and swelling about the left foot.  X-rays taken at the emergency department were consistent with fracture.  He was placed into a splint with instructions for outpatient orthopedic follow-up.  Denies any prior syncopal episodes.  No foot pain or instability prior to the above.    Pain currently 0/10.  Denies any numbness or tingling.    Denies tobacco use.  Works as a .    POSTOP:  Patient doing well postop.  2/10 pain.  Reports compliance with nonweightbearing status.  Taking aspirin as prescribed for DVT prophylaxis. Denies any drainage, fever, chills.      Past Medical History:   Diagnosis Date    Basal cell carcinoma     BPH associated with nocturia 01/11/2023    Erectile dysfunction 01/11/2023    History of Helicobacter pylori infection 01/11/2023    History of sinus surgery 01/11/2023    Mixed hyperlipidemia 08/29/2014       Past Surgical History:   Procedure Laterality Date    FUNCTIONAL ENDOSCOPIC SINUS SURGERY (FESS) USING COMPUTER-ASSISTED NAVIGATION Bilateral 3/31/2023    Procedure: SINUS SURGERY FUNCTIONAL ENDOSCOPIC WITH NAVIGATION;  Surgeon: Neville Navarrete MD;  Location: [facility] OR;  Service: ENT;  Laterality: Bilateral;    NASAL SEPTOPLASTY Bilateral 3/31/2023    Procedure: SEPTOPLASTY;  Surgeon: Neville Navarrete MD;  Location: [facility] OR;  Service: ENT;  Laterality: Bilateral;    OPEN REDUCTION AND INTERNAL FIXATION (ORIF) OF FRACTURE OF METATARSAL BONE Left 12/4/2024    Procedure: ORIF,  FRACTURE, METATARSAL BONE;  Surgeon: Milo Ramirez MD;  Location: [facility];  Service: Orthopedics;  Laterality: Left;  5TH METATARSAL NONUNION TAKEDOWN    SINUS SURGERY      WISDOM TOOTH EXTRACTION      states woke up during procedure       Current Outpatient Medications   Medication Sig    aspirin (ECOTRIN) 81 MG EC tablet Take 1 tablet (81 mg total) by mouth once daily.    atorvastatin (LIPITOR) 40 MG tablet Take 1 tablet (40 mg total) by mouth once daily.    azelastine (ASTELIN) 137 mcg (0.1 %) nasal spray 2 sprays (274 mcg total) by Nasal route 2 (two) times daily.    COQ10, UBIQUINOL, ORAL Take by mouth.    famotidine (PEPCID) 40 MG tablet Take 1 tablet (40 mg total) by mouth 2 (two) times daily.    fluorouraciL (EFUDEX) 5 % cream AAA face bid x 2-4 weeks    ipratropium (ATROVENT) 42 mcg (0.06 %) nasal spray 2 sprays by Each Nostril route 3 (three) times daily as needed (runny nose (rhinitis)).    ketoconazole (NIZORAL) 2 % cream Apply twice a day to affected areas of skin    traZODone (DESYREL) 150 MG tablet 1/3-1 tab PO qhs PRN insomnia     No current facility-administered medications for this visit.       Review of patient's allergies indicates:   Allergen Reactions    Oxycodone-acetaminophen Rash       No family history on file.    Social History     Socioeconomic History    Marital status:    Tobacco Use    Smoking status: Never    Smokeless tobacco: Never   Substance and Sexual Activity    Alcohol use: Yes     Comment: occ    Drug use: Never     Social Drivers of Health     Physical Activity: Unknown (6/21/2024)    Exercise Vital Sign     Days of Exercise per Week: 0 days     Minutes of Exercise per Session: Patient declined   Stress: Stress Concern Present (6/21/2024)    [employer] [place] [employer] - Occupational Stress Questionnaire     Feeling of Stress : To some extent       Physical exam:  There were no vitals filed for this visit.  There is no height or weight on file to  calculate BMI.  General: In no apparent distress; well developed and well nourished.  HEENT: normocephalic; atraumatic.  Cardiovascular: regular rate.  Respiratory: no increased work of breathing.  Musculoskeletal:   Gait: did not assess  Inspection:   Surgical site with well healed scar.  Some residual eschar at the distal plantar margin.  No deep extension. Minimal residual swelling.  No warmth or erythema.  No signs or symptoms of infection.  Gross motor and function intact.  Some altered sensation about the incision to be expected.  Brisk cap refill less than 2 seconds all 5 digits.       Imaging Studies/Outside documentation:  I have ordered/reviewed/interpreted the following images/outside documentation:  1. NWB 3-views of Left foot:  Patient is status post left 5th metatarsal shaft nonunion takedown and ORIF.  Overall alignment well-maintained.  Early callus formation noted on oblique view.  No evidence of implant loosening or failure.        Assessment:  Osbaldo Brewer is a 64 y.o. male with Displaced Left 5th MT diaphyseal fracture.  Left ankle deltoid injury; peroneal tendon strain.     Plan:   Clinical and radiographic findings were discussed.     We will initiate slow progressive weight bearing under the guidance of physical therapy.  Patient to be 50% weight bearing weeks 1 and 2; 75% weight bearing week 3; and Full (100%) weight bearing week 4.  ALL weight bearing to be performed in the boot.    Okay to discontinue oral aspirin use.    Okay to get the surgical site wet in the shower.  No baths/submersion of the wound.    Patient and family voiced understanding.  All questions were answered.  Return to clinic in 1 month for repeat evaluation and x-ray.  Possible transition out of the boot at that time.      This note was created using voice recognition software and may contain grammatical errors.

## 2025-01-17 ENCOUNTER — CLINICAL SUPPORT (OUTPATIENT)
Dept: REHABILITATION | Facility: HOSPITAL | Age: 65
End: 2025-01-17
Attending: ORTHOPAEDIC SURGERY
Payer: MEDICARE

## 2025-01-17 DIAGNOSIS — S92.352A CLOSED DISPLACED FRACTURE OF FIFTH METATARSAL BONE OF LEFT FOOT, INITIAL ENCOUNTER: ICD-10-CM

## 2025-01-17 DIAGNOSIS — S92.352D CLOSED DISPLACED FRACTURE OF FIFTH METATARSAL BONE OF LEFT FOOT WITH ROUTINE HEALING, SUBSEQUENT ENCOUNTER: Primary | ICD-10-CM

## 2025-01-17 DIAGNOSIS — R26.2 DIFFICULTY WALKING: ICD-10-CM

## 2025-01-17 PROCEDURE — 97110 THERAPEUTIC EXERCISES: CPT | Mod: PN | Performed by: PHYSICAL THERAPIST

## 2025-01-17 PROCEDURE — 97140 MANUAL THERAPY 1/> REGIONS: CPT | Mod: PN | Performed by: PHYSICAL THERAPIST

## 2025-01-17 PROCEDURE — 97162 PT EVAL MOD COMPLEX 30 MIN: CPT | Mod: PN | Performed by: PHYSICAL THERAPIST

## 2025-01-17 PROCEDURE — 97112 NEUROMUSCULAR REEDUCATION: CPT | Mod: PN | Performed by: PHYSICAL THERAPIST

## 2025-01-17 NOTE — PLAN OF CARE
[facility]   Physical Therapy Initial Evaluation      Name: Osbaldo Brewer  Clinic Number: [id number]    Therapy Diagnosis:   Encounter Diagnoses   Name Primary?    Closed displaced fracture of fifth metatarsal bone of left foot with routine healing, subsequent encounter Yes    Difficulty walking      Physician: Milo Ramirez MD    Physician Orders: PT Eval and Treat   Medical Diagnosis from Referral: S92.352A (ICD-10-CM) - Closed displaced fracture of fifth metatarsal bone of left foot, initial encounter  Evaluation Date: 1/17/2025  Authorization Period Expiration: 1/16/2026  Plan of Care Expiration: 3/17/2025  Visit # / Visits authorized: 1/ 1  #Visits based on PHYSICAL THERAPY POC: 12    Foto  Date  Score      Foot   Lower Score = Greater Disability   #1/3 1/17/2025 20.0   #2/3     #3/3       Time in  10:00am   Time out 11:15am   Total Appointment Time (timed & untimed codes) 75 minutes      Precautions: Standard  Date of Surgery: 12/04/2024  Pre-Operative Diagnosis:  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  Procedures:  Leftt 5th metatarsal malunion takedown and open reduction internal fixation.        STATUS POST Left 5th metatarsal nonunion takedown and ORIF.  1-2 times per week for 6 to 8 weeks per fracture protocol.  PT to start week of 01/16/2025.   50% WB in boot week 1 AND week 2; 75% WB in boot week 3; 100% WB in boot week 4.   Active/Active assist ankle ROM. May start slow, gradual strengthening once full WB in boot. Modalities as needed.  Stay in boot until next Ortho clinic visit.  Subjective     Date of injury: April 2023   History of current condition/prior medical treatment- patient blacked out and fell off of a barstool in his kitchen resulting in hitting his head on the  block and injuring his L ankle/foot.     Went to hospital and discovered a fracture  in his foot. Orthopedic provider put him in a boot and told him to take weight off of it for 10 weeks. 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 He choose to wait until the end of the year once his work season was over to get surgery, He had surgery on Dec 4, 2024. Patient is going to see his provider again in one month and they will determine at that point if he can come out of his boot.     Patient reports that he has blacked out two more times (last time in August) while sitting down since the initial injury. He states that he has had some testing to determine why this is happening, but they have not found the issue.     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  There are postoperative changes of ORIF of a left 5th metatarsal shaft fracture.  No hardware complication.  There is collapse of the longitudinal plantar arch/pes planus.  There is a loop plantar calcaneal spur.  No radiographically evident new fracture or osseous destructive process.  There is soft tissue swelling about the dorsal and lateral aspect of the forefoot.    Social History: very active, walks, eats well     Pain:      Current 1/10       Worst 8/10       Best  1/10     Location L 5th MET and ankle    Description  Burning pain     Aggravating factors Worse at night when turning trying  "to sleep    Easing factors  Propping leg up     Medical History:   Past Medical History:   Diagnosis Date    Basal cell carcinoma     BPH associated with nocturia 01/11/2023    Erectile dysfunction 01/11/2023    History of Helicobacter pylori infection 01/11/2023    History of sinus surgery 01/11/2023    Mixed hyperlipidemia 08/29/2014     Surgical History:   Osbaldo Brewer  has a past surgical history that includes Sinus surgery; [place] tooth extraction; Functional endoscopic sinus surgery (FESS) using computer-assisted navigation (Bilateral, 3/31/2023); Nasal septoplasty (Bilateral, 3/31/2023); and Open reduction and internal fixation (ORIF) of fracture of metatarsal bone (Left, 12/4/2024).    Medications:   Osbaldo has a current medication list which includes the following prescription(s): aspirin, atorvastatin, azelastine, ubiquinol, famotidine, fluorouracil, ipratropium, ketoconazole, and trazodone.    Allergies:   Review of patient's allergies indicates:   Allergen Reactions    Oxycodone-acetaminophen Rash      Objective    1/17/2025:  Observation/posture: mild eschar along L 5th MET incision, but closed and healing     Gait/assistive device: B axillary crutches     Range of Motion: AROM:  Ankle Right  Left    Dorsiflexion 3 -5 ACTIVE RANGE OF MOTION, 0 PROM    Plantarflexion 54 35     Inversion 23 0 ACTIVE RANGE OF MOTION, 8 PROM    Eversion 8 4 ACTIVE RANGE OF MOTION, 4 PROM        Strength:  Ankle Right  Left    Dorsiflexion 5/5 4/5    Plantarflexion 4+/5 4-/5   Inversion 4+/5 3/5   Eversion 4-/5 3/5     Joint Mobility:    Left   TCJ Hypomobile    STJ Hypomobile    METs Hypomobile      Palpation/sensation:   LE light touch sensation: area of medial malleolus, dorsum and medial aspect of great toe feels altered sensation     Flexibility:    Left   Gastroc Restricted    Soleus Restricted    Plantar fascia  Restricted      Function/balance:     Right Left   Single limb stance - tba tba   30" sit - " "stand  - -     Edema:    Right Left   Ankle figure of eight 59.0 cm 60.5 cm    Ankle joint line 29.5 cm 30.0 cm    METs 27.0 cm 27.0 cm     Treatment   Total Treatment time (time-based codes) separate from Evaluation: 38 minutes    + indicates new exercise   Bold indicates completed exercise    CHARGES BASED ON 1-1 TX:  therapeutic exercises to develop strength, endurance, ROM, flexibility, posture, and core stabilization for 20 minutes:  Patient education on nature of current condition and PHYSICAL THERAPY POC  Written HOME EXERCISE PROGRAM provided, reviewed, patient demo understanding   B toe crunches on towel, 3" hold, 3x10  L toe yoga, 3x10     manual therapy techniques: Joint mobilizations, Manual traction, Myofacial release, Soft tissue Mobilization, and Friction Massage for 10 minutes:  STM at L METs    neuromuscular re-education activities to improve: Balance, Coordination, Kinesthetic, Sense, Proprioception, and Posture for 8 minutes:  25%-50% WB ( lbs) on L LE with use of scale for proprioceptive feedback, B axillary crutches, patient standing  Medial - lateral, 2x10  Forward/retro, 2x10     therapeutic activities to improve functional performance for 00 minutes:      gait training to improve functional mobility and safety for 00 minutes:      Home Exercises and Patient Education Provided  Education provided:   - yes    Home Exercises Provided: yes.  Exercises were reviewed and OSBALDO was able to demonstrate them prior to the end of the session.  OSBALDO demonstrated good  understanding of the education provided.     Assessment     Osbaldo is a 64 y.o. referred to outpatient Physical Therapy with a medical diagnosis of S92.352A (ICD-10-CM) - Closed displaced fracture of fifth metatarsal bone of left foot, initial encounter.     Pt presents with decreased ROM, muscle strength, flexibility, joint mobility. Increased pain, stiffness, soft tissue restriction. Impaired posture, joint mechanics, balance, " gait pattern.     The patient's current task deficits include the following: limitation in ADL, self care, social/recreational tasks.     Patient prognosis: good  Rehab potential: good    Patient will benefit from skilled outpatient Physical Therapy to address the deficits stated above and in the chart below, provide patient /family education, to maximize patient's level of independence, and to address functional deficits.    Plan of care discussed with patient: Yes  Patient's spiritual, cultural and educational needs considered and patient is agreeable to the plan of care and goals as stated below:     Anticipated Barriers for therapy: chronicity of current injury and co morbidities     Medical Necessity is demonstrated by the following  History  Co-morbidities and personal factors that may impact the plan of care [] LOW: no personal factors / co-morbidities  [x] MODERATE: 1-2 personal factors / co-morbidities  [] HIGH: 3+ personal factors / co-morbidities    Moderate / High Support Documentation:   Co-morbidities affecting plan of care:    Basal cell carcinoma    BPH associated with nocturia    Erectile dysfunction    History of Helicobacter pylori infection    History of sinus surgery    Mixed hyperlipidemia     Personal Factors:   coping style  social background  lifestyle     Examination  Body Structures and Functions, activity limitations and participation restrictions that may impact the plan of care [] LOW: addressing 1-2 elements  [x] MODERATE: 3+ elements  [] HIGH: 4+ elements    Clinical Presentation [] LOW: stable  [x] MODERATE: Evolving  [] HIGH: Unstable     Decision Making/ Complexity Score: moderate       Goals:   Short Terms Goals: 3 weeks    Goal  Progress  Date    (1)  Patient will be I with HOME EXERCISE PROGRAM  Initial, Not Met 1/17/2025     (2)  Patient will obtain 3 deg R ankle DF ACTIVE RANGE OF MOTION to indicate improved ability to stand, > 60 minutes   Initial, Not Met  1/17/2025     (3)    Patient will obtain 45 deg L ankle PF ACTIVE RANGE OF MOTION to indicate improved ability to complete walk, 60 minutes   Initial, Not Met  1/17/2025       Long Term Goals: 6 weeks    Goal  Progress  Date    (1)  Patient will obtain 12 L ankle inversion ACTIVE RANGE OF MOTION to indicate improved ability to complete transitional movements into/our of vehicle  Initial, Not Met  1/17/2025   (2)   Patient will obtain 8 deg L ankle eversion ACTIVE RANGE OF MOTION to indicate improved ability to to complete transitional movements into/out of boat   Initial, Not Met 1/17/2025    (3)   Patient will obtain 4/5 R ankle INV MMT to indicate improved ability to go grocery shopping   Initial, Not Met  1/17/2025     (4)  Patient will obtain 4/5 R ankle EV MMT to 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    Outpatient Physical Therapy 2 times weekly for 6 weeks to include the following interventions: Cervical/Lumbar Traction, Electrical Stimulation re-eval, dry needling, Gait Training, Iontophoresis (with ), Manual Therapy, Moist Heat/ Ice, Neuromuscular Re-ed, Patient Education, Self Care, Therapeutic Activities, and Therapeutic Exercise.     Physical therapist and physical therapy assistant(s) will met face to face to discuss patient's treatment plan and progress towards established goals. Pt will be seen by a physical therapist minimally every 6th visit or every 30 days.    Potential for KX modifier and additional visits due to subjective report, objective examination, co morbidities, nature of current condition and current functional status.     Benita Valentin, PT, DPT  1/17/2025       I CERTIFY THE NEED FOR THESE SERVICES FURNISHED UNDER THIS PLAN OF TREATMENT AND WHILE UNDER MY CARE     Physician's comments:           Physician's Signature: ___________________________________________________

## 2025-01-17 NOTE — PROGRESS NOTES
[facility]   Physical Therapy Initial Evaluation      Name: Osbaldo Brewer  Clinic Number: [id number]    Therapy Diagnosis:   Encounter Diagnoses   Name Primary?    Closed displaced fracture of fifth metatarsal bone of left foot with routine healing, subsequent encounter Yes    Difficulty walking      Physician: Milo Ramirez MD    Physician Orders: PT Eval and Treat   Medical Diagnosis from Referral: S92.352A (ICD-10-CM) - Closed displaced fracture of fifth metatarsal bone of left foot, initial encounter  Evaluation Date: 1/17/2025  Authorization Period Expiration: 1/16/2026  Plan of Care Expiration: 3/17/2025  Visit # / Visits authorized: 1/ 1  #Visits based on PHYSICAL THERAPY POC: 12    Foto  Date  Score      Foot   Lower Score = Greater Disability   #1/3 1/17/2025 20.0   #2/3     #3/3       Time in  10:00am   Time out 11:15am   Total Appointment Time (timed & untimed codes) 75 minutes      Precautions: Standard  Date of Surgery: 12/04/2024  Pre-Operative Diagnosis:  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  Procedures:  Leftt 5th metatarsal malunion takedown and open reduction internal fixation.        STATUS POST Left 5th metatarsal nonunion takedown and ORIF.  1-2 times per week for 6 to 8 weeks per fracture protocol.  PT to start week of 01/16/2025.   50% WB in boot week 1 AND week 2; 75% WB in boot week 3; 100% WB in boot week 4.   Active/Active assist ankle ROM. May start slow, gradual strengthening once full WB in boot. Modalities as needed.  Stay in boot until next Ortho clinic visit.  Subjective     Date of injury: April 2023   History of current condition/prior medical treatment- patient blacked out and fell off of a barstool in his kitchen resulting in hitting his head on the  block and injuring his L ankle/foot.     Went to hospital and discovered a fracture  in his foot. Orthopedic provider put him in a boot and told him to take weight off of it for 10 weeks. 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 He choose to wait until the end of the year once his work season was over to get surgery, He had surgery on Dec 4, 2024. Patient is going to see his provider again in one month and they will determine at that point if he can come out of his boot.     Patient reports that he has blacked out two more times (last time in August) while sitting down since the initial injury. He states that he has had some testing to determine why this is happening, but they have not found the issue.     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  There are postoperative changes of ORIF of a left 5th metatarsal shaft fracture.  No hardware complication.  There is collapse of the longitudinal plantar arch/pes planus.  There is a loop plantar calcaneal spur.  No radiographically evident new fracture or osseous destructive process.  There is soft tissue swelling about the dorsal and lateral aspect of the forefoot.    Social History: very active, walks, eats well     Pain:      Current 1/10       Worst 8/10       Best  1/10     Location L 5th MET and ankle    Description  Burning pain     Aggravating factors Worse at night when turning trying  "to sleep    Easing factors  Propping leg up     Medical History:   Past Medical History:   Diagnosis Date    Basal cell carcinoma     BPH associated with nocturia 01/11/2023    Erectile dysfunction 01/11/2023    History of Helicobacter pylori infection 01/11/2023    History of sinus surgery 01/11/2023    Mixed hyperlipidemia 08/29/2014     Surgical History:   Osbaldo Brewer  has a past surgical history that includes Sinus surgery; [place] tooth extraction; Functional endoscopic sinus surgery (FESS) using computer-assisted navigation (Bilateral, 3/31/2023); Nasal septoplasty (Bilateral, 3/31/2023); and Open reduction and internal fixation (ORIF) of fracture of metatarsal bone (Left, 12/4/2024).    Medications:   Osbaldo has a current medication list which includes the following prescription(s): aspirin, atorvastatin, azelastine, ubiquinol, famotidine, fluorouracil, ipratropium, ketoconazole, and trazodone.    Allergies:   Review of patient's allergies indicates:   Allergen Reactions    Oxycodone-acetaminophen Rash      Objective    1/17/2025:  Observation/posture: mild eschar along L 5th MET incision, but closed and healing     Gait/assistive device: B axillary crutches     Range of Motion: AROM:  Ankle Right  Left    Dorsiflexion 3 -5 ACTIVE RANGE OF MOTION, 0 PROM    Plantarflexion 54 35     Inversion 23 0 ACTIVE RANGE OF MOTION, 8 PROM    Eversion 8 4 ACTIVE RANGE OF MOTION, 4 PROM        Strength:  Ankle Right  Left    Dorsiflexion 5/5 4/5    Plantarflexion 4+/5 4-/5   Inversion 4+/5 3/5   Eversion 4-/5 3/5     Joint Mobility:    Left   TCJ Hypomobile    STJ Hypomobile    METs Hypomobile      Palpation/sensation:   LE light touch sensation: area of medial malleolus, dorsum and medial aspect of great toe feels altered sensation     Flexibility:    Left   Gastroc Restricted    Soleus Restricted    Plantar fascia  Restricted      Function/balance:     Right Left   Single limb stance - tba tba   30" sit - " "stand  - -     Edema:    Right Left   Ankle figure of eight 59.0 cm 60.5 cm    Ankle joint line 29.5 cm 30.0 cm    METs 27.0 cm 27.0 cm     Treatment   Total Treatment time (time-based codes) separate from Evaluation: 38 minutes    + indicates new exercise   Bold indicates completed exercise    CHARGES BASED ON 1-1 TX:  therapeutic exercises to develop strength, endurance, ROM, flexibility, posture, and core stabilization for 20 minutes:  Patient education on nature of current condition and PHYSICAL THERAPY POC  Written HOME EXERCISE PROGRAM provided, reviewed, patient demo understanding   B toe crunches on towel, 3" hold, 3x10  L toe yoga, 3x10     manual therapy techniques: Joint mobilizations, Manual traction, Myofacial release, Soft tissue Mobilization, and Friction Massage for 10 minutes:  STM at L METs    neuromuscular re-education activities to improve: Balance, Coordination, Kinesthetic, Sense, Proprioception, and Posture for 8 minutes:  25%-50% WB ( lbs) on L LE with use of scale for proprioceptive feedback, B axillary crutches, patient standing  Medial - lateral, 2x10  Forward/retro, 2x10     therapeutic activities to improve functional performance for 00 minutes:      gait training to improve functional mobility and safety for 00 minutes:      Home Exercises and Patient Education Provided  Education provided:   - yes    Home Exercises Provided: yes.  Exercises were reviewed and OSBALDO was able to demonstrate them prior to the end of the session.  OSBALDO demonstrated good  understanding of the education provided.     Assessment     Osbaldo is a 64 y.o. referred to outpatient Physical Therapy with a medical diagnosis of S92.352A (ICD-10-CM) - Closed displaced fracture of fifth metatarsal bone of left foot, initial encounter.     Pt presents with decreased ROM, muscle strength, flexibility, joint mobility. Increased pain, stiffness, soft tissue restriction. Impaired posture, joint mechanics, balance, " gait pattern.     The patient's current task deficits include the following: limitation in ADL, self care, social/recreational tasks.     Patient prognosis: good  Rehab potential: good    Patient will benefit from skilled outpatient Physical Therapy to address the deficits stated above and in the chart below, provide patient /family education, to maximize patient's level of independence, and to address functional deficits.    Plan of care discussed with patient: Yes  Patient's spiritual, cultural and educational needs considered and patient is agreeable to the plan of care and goals as stated below:     Anticipated Barriers for therapy: chronicity of current injury and co morbidities     Medical Necessity is demonstrated by the following  History  Co-morbidities and personal factors that may impact the plan of care [] LOW: no personal factors / co-morbidities  [x] MODERATE: 1-2 personal factors / co-morbidities  [] HIGH: 3+ personal factors / co-morbidities    Moderate / High Support Documentation:   Co-morbidities affecting plan of care:    Basal cell carcinoma    BPH associated with nocturia    Erectile dysfunction    History of Helicobacter pylori infection    History of sinus surgery    Mixed hyperlipidemia     Personal Factors:   coping style  social background  lifestyle     Examination  Body Structures and Functions, activity limitations and participation restrictions that may impact the plan of care [] LOW: addressing 1-2 elements  [x] MODERATE: 3+ elements  [] HIGH: 4+ elements    Clinical Presentation [] LOW: stable  [x] MODERATE: Evolving  [] HIGH: Unstable     Decision Making/ Complexity Score: moderate       Goals:   Short Terms Goals: 3 weeks    Goal  Progress  Date    (1)  Patient will be I with HOME EXERCISE PROGRAM  Initial, Not Met 1/17/2025     (2)  Patient will obtain 3 deg R ankle DF ACTIVE RANGE OF MOTION to indicate improved ability to stand, > 60 minutes   Initial, Not Met  1/17/2025     (3)    Patient will obtain 45 deg L ankle PF ACTIVE RANGE OF MOTION to indicate improved ability to complete walk, 60 minutes   Initial, Not Met  1/17/2025       Long Term Goals: 6 weeks    Goal  Progress  Date    (1)  Patient will obtain 12 L ankle inversion ACTIVE RANGE OF MOTION to indicate improved ability to complete transitional movements into/our of vehicle  Initial, Not Met  1/17/2025   (2)   Patient will obtain 8 deg L ankle eversion ACTIVE RANGE OF MOTION to indicate improved ability to to complete transitional movements into/out of boat   Initial, Not Met 1/17/2025    (3)   Patient will obtain 4/5 R ankle INV MMT to indicate improved ability to go grocery shopping   Initial, Not Met  1/17/2025     (4)  Patient will obtain 4/5 R ankle EV MMT to 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    Outpatient Physical Therapy 2 times weekly for 6 weeks to include the following interventions: Cervical/Lumbar Traction, Electrical Stimulation re-eval, dry needling, Gait Training, Iontophoresis (with ), Manual Therapy, Moist Heat/ Ice, Neuromuscular Re-ed, Patient Education, Self Care, Therapeutic Activities, and Therapeutic Exercise.     Physical therapist and physical therapy assistant(s) will met face to face to discuss patient's treatment plan and progress towards established goals. Pt will be seen by a physical therapist minimally every 6th visit or every 30 days.    Potential for KX modifier and additional visits due to subjective report, objective examination, co morbidities, nature of current condition and current functional status.     Benita Valentin, PT, DPT  1/17/2025       I CERTIFY THE NEED FOR THESE SERVICES FURNISHED UNDER THIS PLAN OF TREATMENT AND WHILE UNDER MY CARE     Physician's comments:           Physician's Signature: ___________________________________________________

## 2025-01-23 ENCOUNTER — CLINICAL SUPPORT (OUTPATIENT)
Dept: REHABILITATION | Facility: HOSPITAL | Age: 65
End: 2025-01-23
Attending: ORTHOPAEDIC SURGERY
Payer: MEDICARE

## 2025-01-23 ENCOUNTER — PATIENT MESSAGE (OUTPATIENT)
Dept: CARDIOLOGY | Facility: CLINIC | Age: 65
End: 2025-01-23
Payer: MEDICARE

## 2025-01-23 DIAGNOSIS — R26.2 DIFFICULTY WALKING: Primary | ICD-10-CM

## 2025-01-23 PROCEDURE — 97110 THERAPEUTIC EXERCISES: CPT | Mod: PN,CQ

## 2025-01-23 PROCEDURE — 97140 MANUAL THERAPY 1/> REGIONS: CPT | Mod: PN,CQ

## 2025-01-23 PROCEDURE — 97112 NEUROMUSCULAR REEDUCATION: CPT | Mod: PN,CQ

## 2025-01-23 NOTE — PROGRESS NOTES
[facility]   Physical Therapy Initial Evaluation      Name: Osbaldo Brewer  Clinic Number: [id number]    Therapy Diagnosis:   Encounter Diagnosis   Name Primary?    Difficulty walking Yes       Physician: Milo Ramirez MD    Physician Orders: PT Eval and Treat   Medical Diagnosis from Referral: S92.352A (ICD-10-CM) - Closed displaced fracture of fifth metatarsal bone of left foot, initial encounter  Evaluation Date: 1/23/2025  Authorization Period Expiration: 1/16/2026  Plan of Care Expiration: 3/17/2025  Visit # / Visits authorized: 1/ 20 +eval  #Visits based on PHYSICAL THERAPY POC: 12  PTA visit #: 1 / 5    Foto  Date  Score      Foot   Lower Score = Greater Disability   #1/3 1/17/2025 20.0   #2/3     #3/3       Time in  12:00 pm   Time out 1:00 pm   Total Appointment Time (timed & untimed codes) 60 minutes      Precautions: Standard  Date of Surgery: 12/04/2024  Pre-Operative Diagnosis:  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  Procedures:  Leftt 5th metatarsal malunion takedown and open reduction internal fixation.        STATUS POST Left 5th metatarsal nonunion takedown and ORIF.  1-2 times per week for 6 to 8 weeks per fracture protocol.  PT to start week of 01/16/2025.   50% WB in boot week 1 AND week 2; 75% WB in boot week 3; 100% WB in boot week 4.   Active/Active assist ankle ROM. May start slow, gradual strengthening once full WB in boot. Modalities as needed.  Stay in boot until next Ortho clinic visit.  Subjective     Patient reports still having a fair amount of pain into the foot, maintaining 50% weight bearing.   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    Treatment   Total Treatment time (time-based codes) separate from Evaluation: 38 minutes    + indicates new exercise   Bold indicates completed exercise    CHARGES BASED ON 1-1 TX:  therapeutic exercises to develop strength, endurance, ROM, flexibility, posture, and core stabilization for 25 minutes:  Patient education on nature of current condition and PHYSICAL THERAPY POC  Written HOME EXERCISE PROGRAM provided, reviewed, patient demo understanding   Long sitting hamstring stretch  +SLR, SL hip abd, SL hip add, prone hip ext x 10 each, review for HEP  B toe crunches on towel, 3" hold, 3x10  L toe yoga, 3x10     manual therapy techniques: Joint mobilizations, Manual traction, Myofacial release, Soft tissue Mobilization, and Friction Massage for 15 minutes:  STM at L METs  +Toe mobilization grade II-III  +[place] mobilization grade II-III    neuromuscular re-education activities to improve: Balance, Coordination, Kinesthetic, Sense, Proprioception, and Posture for 20 minutes:  +Ankle PF/DF, IV/EV for motor control x 15 each  +Long sitting ankle circles x 10 each   50%-75% WB (110-165 lbs) on L LE with use of scale for proprioceptive feedback, B axillary crutches, patient standing  Medial - lateral, 2x10  Forward/retro, 2x10     therapeutic activities to improve functional performance for 00 minutes:      gait training to improve functional mobility and safety for 00 minutes:      Home Exercises " and Patient Education Provided  Education provided:   - yes    Home Exercises Provided: yes.  Exercises were reviewed and OSBALDO was able to demonstrate them prior to the end of the session.  OSBALDO demonstrated good  understanding of the education provided.     Assessment     Osbaldo is a 64 y.o. referred to outpatient Physical Therapy with a medical diagnosis of S92.352A (ICD-10-CM) - Closed displaced fracture of fifth metatarsal bone of left foot, initial encounter.     Pt presents to clinic with slow improvement of ankle mobility, toe mobility, and tolerance to weight bearing. Stiffness of lateral MTP joints primarily most limited. Able to initiate light AROM of the left foot in all directions. Introduced allowance of 50-75% weight bearing onto the L foot as tolerated. Also updated HEP for hip strengthening exercises to improve functional mechanics of the left lower extremity.   He continues with decreased ROM, muscle strength, flexibility, joint mobility. Increased pain, stiffness, soft tissue restriction. Impaired posture, joint mechanics, balance, gait pattern.     The patient's current task deficits include the following: limitation in ADL, self care, social/recreational tasks.     Patient prognosis: good  Rehab potential: good    Patient will benefit from skilled outpatient Physical Therapy to address the deficits stated above and in the chart below, provide patient /family education, to maximize patient's level of independence, and to address functional deficits.    Plan of care discussed with patient: Yes  Patient's spiritual, cultural and educational needs considered and patient is agreeable to the plan of care and goals as stated below:     Anticipated Barriers for therapy: chronicity of current injury and co morbidities       Goals:   Short Terms Goals: 3 weeks    Goal  Progress  Date    (1)  Patient will be I with HOME EXERCISE PROGRAM  Initial, Not Met 1/23/2025     (2)  Patient will obtain 3 deg R  ankle DF ACTIVE RANGE OF MOTION to indicate improved ability to stand, > 60 minutes   Initial, Not Met  1/23/2025     (3)   Patient will obtain 45 deg L ankle PF ACTIVE RANGE OF MOTION to indicate improved ability to complete walk, 60 minutes   Initial, Not Met  1/23/2025       Long Term Goals: 6 weeks    Goal  Progress  Date    (1)  Patient will obtain 12 L ankle inversion ACTIVE RANGE OF MOTION to indicate improved ability to complete transitional movements into/our of vehicle  Initial, Not Met  1/23/2025   (2)   Patient will obtain 8 deg L ankle eversion ACTIVE RANGE OF MOTION to indicate improved ability to to complete transitional movements into/out of boat   Initial, Not Met 1/23/2025    (3)   Patient will obtain 4/5 R ankle INV MMT to indicate improved ability to go grocery shopping   Initial, Not Met  1/23/2025     (4)  Patient will obtain 4/5 R ankle EV MMT 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    Outpatient Physical Therapy 2 times weekly for 6 weeks to include the following interventions: Cervical/Lumbar Traction, Electrical Stimulation re-eval, dry needling, Gait Training, Iontophoresis (with ), Manual Therapy, Moist Heat/ Ice, Neuromuscular Re-ed, Patient Education, Self Care, Therapeutic Activities, and Therapeutic Exercise.     Physical therapist and physical therapy assistant(s) will met face to face to discuss patient's treatment plan and progress towards established goals. Pt will be seen by a physical therapist minimally every 6th visit or every 30 days.    Potential for KX modifier and additional visits due to subjective report, objective examination, co morbidities, nature of current condition and current functional status.     Blanca Arriaga, PTA  1/23/2025

## 2025-01-28 ENCOUNTER — CLINICAL SUPPORT (OUTPATIENT)
Dept: REHABILITATION | Facility: HOSPITAL | Age: 65
End: 2025-01-28
Payer: MEDICARE

## 2025-01-28 DIAGNOSIS — R26.2 DIFFICULTY WALKING: Primary | ICD-10-CM

## 2025-01-28 PROCEDURE — 97112 NEUROMUSCULAR REEDUCATION: CPT | Mod: PN | Performed by: PHYSICAL THERAPIST

## 2025-01-28 PROCEDURE — 97140 MANUAL THERAPY 1/> REGIONS: CPT | Mod: PN | Performed by: PHYSICAL THERAPIST

## 2025-01-28 PROCEDURE — 97110 THERAPEUTIC EXERCISES: CPT | Mod: PN | Performed by: PHYSICAL THERAPIST

## 2025-01-28 NOTE — PROGRESS NOTES
[facility]   Physical Therapy Treatment Note     Name: Osbaldo Pazton  Clinic Number: [id number]    Therapy Diagnosis:   Encounter Diagnosis   Name Primary?    Difficulty walking Yes     Physician: Milo Ramirez MD    Physician Orders: PT Eval and Treat   Medical Diagnosis from Referral: S92.352A (ICD-10-CM) - Closed displaced fracture of fifth metatarsal bone of left foot, initial encounter  Evaluation Date: 1/28/2025  Authorization Period Expiration: 1/16/2026  Plan of Care Expiration: 3/17/2025  Visit # / Visits authorized: 3/ 20   #Visits based on PHYSICAL THERAPY POC: 12-20  PTA visit #: 1 / 5    Foto  Date  Score      Foot   Lower Score = Greater Disability   #1/3 1/17/2025 20.0   #2/3     #3/3       Time in  2:00pm   Time out 3:16pm   Total Appointment Time  76 minutes      Precautions: Standard  Date of Surgery: 12/04/2024  Pre-Operative Diagnosis:  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  Procedures:  Leftt 5th metatarsal malunion takedown and open reduction internal fixation.        STATUS POST Left 5th metatarsal nonunion takedown and ORIF.  1-2 times per week for 6 to 8 weeks per fracture protocol.  PT to start week of 01/16/2025.   50% WB in boot week 1 AND week 2; 75% WB in boot week 3; 100% WB in boot week 4.   Active/Active assist ankle ROM. May start slow, gradual strengthening once full WB in boot. Modalities as needed.  Stay in boot until next Ortho clinic visit.  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    Treatment   + indicates new exercise   Bold indicates completed exercise    CHARGES BASED ON 1-1 TX:  therapeutic exercises to develop strength, endurance, ROM, flexibility, posture, and core stabilization for 32 minutes:  Patient education on nature of current condition and PHYSICAL THERAPY POC  Written HOME EXERCISE PROGRAM provided, reviewed, patient demo understanding   Long sitting hamstring stretch  +SLR, SL hip abd, SL hip add, prone hip ext x 10 each, review for HEP  L toe yoga, 3x10   +L ankle alphabet in capital letters, x 1   +toe splaying, 3x10   +[place]  x 6'    manual therapy techniques: Joint mobilizations, Manual traction, Myofacial release, Soft tissue Mobilization, and Friction Massage for 30 minutes:  STM at L METs  +L METs, TCJ, STJ Gr III/IV- JM   +Decompression cupping at dorsal and plantar surface of L foot     neuromuscular re-education activities to improve: Balance, Coordination, Kinesthetic, Sense, Proprioception, and Posture for 14 minutes:  +Ankle PF/DF, IV/EV for motor control x 15 each  +Seated in chair with 2 airex mats: L LE Platinum board, CW/CCW, 3x10  +Seated in chair with 2 airex mats: B LE s Fitter board ant/post, 3x10   +Long sitting ankle circles x 10 each   50%-75% WB (110-165 lbs) on L LE with use of scale for proprioceptive feedback, B axillary crutches, patient standing  Medial - lateral, 2x10  Forward/retro, 2x10     therapeutic activities to improve functional performance for 00 minutes:      gait training to improve functional mobility and safety for 00 minutes:      Home  Exercises and Patient Education Provided  Education provided:   - yes    Home Exercises Provided: yes.  Exercises were reviewed and KAITLIN was able to demonstrate them prior to the end of the session.  KAITLIN demonstrated good  understanding of the education provided.     Assessment   Visible decrease in localized edema at L METs today per PHYSICAL THERAPY observation. Patient reports feeling better after manual intervention including cupping, STM, and JM. ACTIVE RANGE OF MOTION exercises progressed today with good patient tolerance and written HOME EXERCISE PROGRAM updated accordingly. He continues with decreased ROM, muscle strength, flexibility, joint mobility. Increased pain, stiffness, soft tissue restriction. Impaired posture, joint mechanics, balance, gait pattern.     The patient's current task deficits include the following: limitation in ADL, self care, social/recreational tasks.     Patient prognosis: good  Rehab potential: good    Patient will benefit from skilled outpatient Physical Therapy to address the deficits stated above and in the chart below, provide patient /family education, to maximize patient's level of independence, and to address functional deficits.    Anticipated Barriers for therapy: chronicity of current injury and co morbidities     Goals:   Short Terms Goals: 3 weeks    Goal  Progress  Date    (1)  Patient will be I with HOME EXERCISE PROGRAM  Initial, Not Met 1/28/2025     (2)  Patient will obtain 3 deg R ankle DF ACTIVE RANGE OF MOTION to indicate improved ability to stand, > 60 minutes   Initial, Not Met  1/28/2025     (3)   Patient will obtain 45 deg L ankle PF ACTIVE RANGE OF MOTION to indicate improved ability to complete walk, 60 minutes   Initial, Not Met  1/28/2025       Long Term Goals: 6 weeks    Goal  Progress  Date    (1)  Patient will obtain 12 L ankle inversion ACTIVE RANGE OF MOTION to indicate improved ability to complete transitional movements into/our of vehicle   Initial, Not Met  1/28/2025   (2)   Patient will obtain 8 deg L ankle eversion ACTIVE RANGE OF MOTION to indicate improved ability to to complete transitional movements into/out of boat   Initial, Not Met 1/28/2025    (3)   Patient will obtain 4/5 R ankle INV MMT to indicate improved ability to go grocery shopping   Initial, Not Met  1/28/2025     (4)  Patient will obtain 4/5 R ankle EV MMT 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    Outpatient Physical Therapy 2 times weekly for 6 weeks to include the following interventions: Cervical/Lumbar Traction, Electrical Stimulation re-eval, dry needling, Gait Training, Iontophoresis (with ), Manual Therapy, Moist Heat/ Ice, Neuromuscular Re-ed, Patient Education, Self Care, Therapeutic Activities, and Therapeutic Exercise.     Physical therapist and physical therapy assistant(s) will met face to face to discuss patient's treatment plan and progress towards established goals. Pt will be seen by a physical therapist minimally every 6th visit or every 30 days.    Potential for KX modifier and additional visits due to subjective report, objective examination, co morbidities, nature of current condition and current functional status.     Benita Valentin, PT, DPT  1/28/2025

## 2025-01-31 ENCOUNTER — CLINICAL SUPPORT (OUTPATIENT)
Dept: REHABILITATION | Facility: HOSPITAL | Age: 65
End: 2025-01-31
Payer: MEDICARE

## 2025-01-31 DIAGNOSIS — R26.2 DIFFICULTY WALKING: Primary | ICD-10-CM

## 2025-01-31 PROCEDURE — 97110 THERAPEUTIC EXERCISES: CPT | Mod: PN | Performed by: PHYSICAL THERAPIST

## 2025-01-31 PROCEDURE — 97112 NEUROMUSCULAR REEDUCATION: CPT | Mod: PN | Performed by: PHYSICAL THERAPIST

## 2025-01-31 NOTE — PROGRESS NOTES
[facility]   Physical Therapy Treatment Note     Name: Osbaldo Pazton  Clinic Number: [id number]    Therapy Diagnosis:   Encounter Diagnosis   Name Primary?    Difficulty walking Yes     Physician: Milo Ramirez MD    Physician Orders: PT Eval and Treat   Medical Diagnosis from Referral: S92.352A (ICD-10-CM) - Closed displaced fracture of fifth metatarsal bone of left foot, initial encounter  Evaluation Date: 1/31/2025  Authorization Period Expiration: 1/16/2026  Plan of Care Expiration: 3/17/2025  Visit # / Visits authorized: 4/ 20   #Visits based on PHYSICAL THERAPY POC: 12-20  PTA visit #: 1 / 5    Foto  Date  Score      Foot   Lower Score = Greater Disability   #1/3 1/17/2025 20.0   #2/3     #3/3       Time in  9:56am   Time out 11:25am   Total Appointment Time  89 minutes      Precautions: Standard  Date of Surgery: 12/04/2024  Pre-Operative Diagnosis:  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  Procedures:  Leftt 5th metatarsal malunion takedown and open reduction internal fixation.        STATUS POST Left 5th metatarsal nonunion takedown and ORIF.  1-2 times per week for 6 to 8 weeks per fracture protocol.  PT to start week of 01/16/2025.   50% WB in boot week 1 AND week 2; 75% WB in boot week 3; 100% WB in boot week 4.   Active/Active assist ankle ROM. May start slow, gradual strengthening once full WB in boot. Modalities as needed.  Stay in boot until next Ortho clinic visit.  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    Treatment   + indicates new exercise   Bold indicates completed exercise    CHARGES BASED ON 1-1 TX:  therapeutic exercises to develop strength, endurance, ROM, flexibility, posture, and core stabilization for 40 minutes:  +L towel swipes on slide board, INV/EV, x 2' each   Long sitting hamstring stretch  SLR, SL hip abd, SL hip add, prone hip ext x 10 each, review for HEP  L toe yoga, 3x10   L ankle alphabet in capital letters, x 1   Toe splaying, 3x10   [place] , L foot x 4'    manual therapy techniques: Joint mobilizations, Manual traction, Myofacial release, Soft tissue Mobilization, and Friction Massage for 25 minutes:  STM at L METs  L METs, TCJ, STJ Gr III/IV- JM   Decompression cupping at dorsal and plantar surface of L foot     neuromuscular re-education activities to improve: Balance, Coordination, Kinesthetic, Sense, Proprioception, and Posture for 24 minutes:  Seated in chair with 2 airex mats: L LE [place] board, CW/CCW, 3x10  Seated in chair with 2 airex mats: B LE s blue board, ant/post, 5" hold, 3x10    50-75% WB today, 1/31   227.4  113 - 170 lbs   Long sitting ankle circles x 10 each   50%-75% WB (110-165 lbs) on L LE with use of scale for proprioceptive feedback, B HRA, patient standing  Medial - lateral, 2x10  Forward/retro, 3 x10     therapeutic activities to improve functional performance for 00 minutes:      gait training to improve functional mobility and safety for 00 minutes:      Home Exercises and Patient Education Provided  Education provided:   - yes    Home Exercises Provided: " yes.  Exercises were reviewed and KAITLIN was able to demonstrate them prior to the end of the session.  KAITLIN demonstrated good  understanding of the education provided.     Assessment   Patient reports feeling better after manual intervention including cupping, STM, and JM. WB exercises progressed per protocol today with no adverse s/s. He continues with decreased ROM, muscle strength, flexibility, joint mobility. Increased pain, stiffness, soft tissue restriction. Impaired posture, joint mechanics, balance, gait pattern.     The patient's current task deficits include the following: limitation in ADL, self care, social/recreational tasks.     Patient prognosis: good  Rehab potential: good    Patient will benefit from skilled outpatient Physical Therapy to address the deficits stated above and in the chart below, provide patient /family education, to maximize patient's level of independence, and to address functional deficits.    Anticipated Barriers for therapy: chronicity of current injury and co morbidities     Goals:   Short Terms Goals: 3 weeks    Goal  Progress  Date    (1)  Patient will be I with HOME EXERCISE PROGRAM  Initial, Not Met 1/31/2025     (2)  Patient will obtain 3 deg R ankle DF ACTIVE RANGE OF MOTION to indicate improved ability to stand, > 60 minutes   Initial, Not Met  1/31/2025     (3)   Patient will obtain 45 deg L ankle PF ACTIVE RANGE OF MOTION to indicate improved ability to complete walk, 60 minutes   Initial, Not Met  1/31/2025       Long Term Goals: 6 weeks    Goal  Progress  Date    (1)  Patient will obtain 12 L ankle inversion ACTIVE RANGE OF MOTION to indicate improved ability to complete transitional movements into/our of vehicle  Initial, Not Met  1/31/2025   (2)   Patient will obtain 8 deg L ankle eversion ACTIVE RANGE OF MOTION to indicate improved ability to to complete transitional movements into/out of boat   Initial, Not Met 1/31/2025    (3)   Patient will obtain 4/5 R  ankle INV MMT to indicate improved ability to go grocery shopping   Initial, Not Met  1/31/2025     (4)  Patient will obtain 4/5 R ankle EV MMT 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    Outpatient Physical Therapy 2 times weekly for 6 weeks to include the following interventions: Cervical/Lumbar Traction, Electrical Stimulation re-eval, dry needling, Gait Training, Iontophoresis (with ), Manual Therapy, Moist Heat/ Ice, Neuromuscular Re-ed, Patient Education, Self Care, Therapeutic Activities, and Therapeutic Exercise.     Physical therapist and physical therapy assistant(s) will met face to face to discuss patient's treatment plan and progress towards established goals. Pt will be seen by a physical therapist minimally every 6th visit or every 30 days.    Potential for KX modifier and additional visits due to subjective report, objective examination, co morbidities, nature of current condition and current functional status.     Benita Valentin, PT, DPT  1/31/2025

## 2025-02-04 ENCOUNTER — CLINICAL SUPPORT (OUTPATIENT)
Dept: REHABILITATION | Facility: HOSPITAL | Age: 65
End: 2025-02-04
Payer: MEDICARE

## 2025-02-04 DIAGNOSIS — R26.2 DIFFICULTY WALKING: Primary | ICD-10-CM

## 2025-02-04 PROCEDURE — 97112 NEUROMUSCULAR REEDUCATION: CPT | Mod: PN | Performed by: PHYSICAL THERAPIST
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
## 2025-02-06 ENCOUNTER — CLINICAL SUPPORT (OUTPATIENT)
Dept: REHABILITATION | Facility: HOSPITAL | Age: 65
End: 2025-02-06
Payer: MEDICARE

## 2025-02-06 DIAGNOSIS — R26.2 DIFFICULTY WALKING: Primary | ICD-10-CM

## 2025-02-06 PROCEDURE — 97530 THERAPEUTIC ACTIVITIES: CPT | Mod: PN,CQ

## 2025-02-06 PROCEDURE — 97110 THERAPEUTIC EXERCISES: CPT | Mod: PN,CQ

## 2025-02-06 PROCEDURE — 97140 MANUAL THERAPY 1/> REGIONS: CPT | Mod: PN,CQ

## 2025-02-06 PROCEDURE — 97112 NEUROMUSCULAR REEDUCATION: CPT | Mod: PN,CQ
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
## 2025-02-11 ENCOUNTER — CLINICAL SUPPORT (OUTPATIENT)
Dept: REHABILITATION | Facility: HOSPITAL | Age: 65
End: 2025-02-11
Payer: MEDICARE

## 2025-02-11 DIAGNOSIS — R26.2 DIFFICULTY WALKING: Primary | ICD-10-CM

## 2025-02-11 PROCEDURE — 97112 NEUROMUSCULAR REEDUCATION: CPT | Mod: PN | Performed by: PHYSICAL THERAPIST
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
## 2025-02-13 DIAGNOSIS — S92.352A CLOSED DISPLACED FRACTURE OF FIFTH METATARSAL BONE OF LEFT FOOT, INITIAL ENCOUNTER: Primary | ICD-10-CM

## 2025-02-14 ENCOUNTER — CLINICAL SUPPORT (OUTPATIENT)
Dept: REHABILITATION | Facility: HOSPITAL | Age: 65
End: 2025-02-14
Payer: MEDICARE

## 2025-02-14 DIAGNOSIS — R26.2 DIFFICULTY WALKING: Primary | ICD-10-CM

## 2025-02-14 PROCEDURE — 97112 NEUROMUSCULAR REEDUCATION: CPT | Mod: KX,PN | Performed by: PHYSICAL THERAPIST

## 2025-02-14 PROCEDURE — 97110 THERAPEUTIC EXERCISES: CPT | Mod: KX,PN | Performed by: PHYSICAL THERAPIST

## 2025-02-14 NOTE — PROGRESS NOTES
[facility]   Physical Therapy Treatment Note     Name: Osbaldo Pazton  Clinic Number: [id number]    Therapy Diagnosis:   Encounter Diagnosis   Name Primary?    Difficulty walking Yes       Physician: Milo Ramirez MD    Physician Orders: PT Eval and Treat   Medical Diagnosis from Referral: S92.352A (ICD-10-CM) - Closed displaced fracture of fifth metatarsal bone of left foot, initial encounter  Evaluation Date: 2/14/2025  Authorization Period Expiration: 1/16/2026  Plan of Care Expiration: 3/17/2025  Visit # / Visits authorized: 7/ 20   #Visits based on PHYSICAL THERAPY POC: 12-20  PTA visit #: 1 / 5    Foto  Date  Score      Foot   Lower Score = Greater Disability   #1/3 1/17/2025 20.0   #2/3 2/4/2025 21.0   #3/3       Time in  2:02pm   Time out 3:09pm   Total Appointment Time  67 minutes      Precautions: Standard  Date of Surgery: 12/04/2024  Pre-Operative Diagnosis:  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  Procedures:  Leftt 5th metatarsal malunion takedown and open reduction internal fixation.        STATUS POST Left 5th metatarsal nonunion takedown and ORIF.  1-2 times per week for 6 to 8 weeks per fracture protocol.  PT to start week of 01/16/2025.   50% WB in boot week 1 AND week 2; 75% WB in boot week 3; 100% WB in boot week 4.   Active/Active assist ankle ROM. May start slow, gradual strengthening once full WB in boot. Modalities as needed.  Stay in boot until next Ortho clinic visit.  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    Treatment   + indicates new exercise   Bold indicates completed exercise    CHARGES BASED ON 1-1 TX:  therapeutic exercises to develop strength, endurance, ROM, flexibility, posture, and core stabilization for 20 minutes:  L towel swipes on slide board, INV/EV, +#3 on towel, x 30 each way  Long sitting hamstring stretch, 30"x5   SLR, SL hip abd, SL hip add, prone hip ext x 10 each, review for HEP  L toe yoga, 3x10   L quad sets, 3" hold, 3x10  L ankle alphabet in capital letters, x 1   Toe splaying, 3x10   [place] , L foot x1 cup    manual therapy techniques: Joint mobilizations, Manual traction, Myofacial release, Soft tissue Mobilization, and Friction Massage for 10 minutes:  STM at L METs  L METs, TCJ, STJ Gr III/IV- JM   Decompression cupping at dorsal and plantar surface of L foot     neuromuscular re-education activities to improve: Balance, Coordination, Kinesthetic, Sense, Proprioception, and Posture for 33 minutes:  Seated L heel raise with #15 dumbbell on top of L knee, 3x10  Seated in chair with 2 airex mats: L LE +BAPS board on level 2, CW/CCW, 3x10  Seated in chair with 2 airex mats: B LE s +Fitter board, ant/post, 5" hold, 3x10   +Seated L arch domes, 3" hold, x 15     therapeutic activities to improve functional performance for 4 minutes:   50-75% WB today, 1/31   227.4 current weight  113 - 170 lbs   100% WB on L LE, patient standing with light unilateral HRA initially, level surface   Medial - lateral, x10  Forward/retro, x10   Instruction on proper use of unilateral crutch during ambulation " with upright posture, weight bearing status, and use of UE on crutch      gait training to improve functional mobility and safety for 00 minutes:      Home Exercises and Patient Education Provided  Education provided:   - yes    Home Exercises Provided: yes.  Exercises were reviewed and KAITLIN was able to demonstrate them prior to the end of the session.  KAITLIN demonstrated good  understanding of the education provided.     Assessment   Patient continues with edema to the left foot and lower leg, responding well to manual intervention including cupping, STM, and JM. Weight bearing exercises progressed per protocol (100%) based on being at 4 week dina; no adverse s/s. Arch domes initiated today to facilitate pes cavus at L foot.     He continues with decreased ROM, muscle strength, flexibility, joint mobility. Increased pain, stiffness, soft tissue restriction. Impaired posture, joint mechanics, balance, gait pattern.     The patient's current task deficits include the following: limitation in ADL, self care, social/recreational tasks.     Patient prognosis: good  Rehab potential: good    Patient will benefit from skilled outpatient Physical Therapy to address the deficits stated above and in the chart below, provide patient /family education, to maximize patient's level of independence, and to address functional deficits.    Anticipated Barriers for therapy: chronicity of current injury and co morbidities     Goals:   Short Terms Goals: 3 weeks    Goal  Progress  Date    (1)  Patient will be I with HOME EXERCISE PROGRAM  Initial, Not Met 2/14/2025     (2)  Patient will obtain 3 deg R ankle DF ACTIVE RANGE OF MOTION to indicate improved ability to stand, > 60 minutes   Initial, Not Met  2/14/2025     (3)   Patient will obtain 45 deg L ankle PF ACTIVE RANGE OF MOTION to indicate improved ability to complete walk, 60 minutes   Initial, Not Met  2/14/2025       Long Term Goals: 6 weeks    Goal  Progress  Date    (1)   Patient will obtain 12 L ankle inversion ACTIVE RANGE OF MOTION to indicate improved ability to complete transitional movements into/our of vehicle  Initial, Not Met  2/14/2025   (2)   Patient will obtain 8 deg L ankle eversion ACTIVE RANGE OF MOTION to indicate improved ability to to complete transitional movements into/out of boat   Initial, Not Met 2/14/2025    (3)   Patient will obtain 4/5 R ankle INV MMT to indicate improved ability to go grocery shopping   Initial, Not Met  2/14/2025     (4)  Patient will obtain 4/5 R ankle EV MMT to 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    Outpatient Physical Therapy 2 times weekly for 6 weeks to include the following interventions: Cervical/Lumbar Traction, Electrical Stimulation re-eval, dry needling, Gait Training, Iontophoresis (with ), Manual Therapy, Moist Heat/ Ice, Neuromuscular Re-ed, Patient Education, Self Care, Therapeutic Activities, and Therapeutic Exercise.     Physical therapist and physical therapy assistant(s) will met face to face to discuss patient's treatment plan and progress towards established goals. Pt will be seen by a physical therapist minimally every 6th visit or every 30 days.    Potential for KX modifier and additional visits due to subjective report, objective examination, co morbidities, nature of current condition and current functional status.     Benita Valentin, PT, DPT  2/14/2025

## 2025-02-18 ENCOUNTER — HOSPITAL ENCOUNTER (OUTPATIENT)
Dept: RADIOLOGY | Facility: HOSPITAL | Age: 65
Discharge: HOME | End: 2025-02-18
Attending: ORTHOPAEDIC SURGERY
Payer: MEDICARE

## 2025-02-18 ENCOUNTER — OFFICE VISIT (OUTPATIENT)
Dept: ORTHOPEDICS | Facility: CLINIC | Age: 65
End: 2025-02-18
Payer: MEDICARE

## 2025-02-18 ENCOUNTER — CLINICAL SUPPORT (OUTPATIENT)
Dept: REHABILITATION | Facility: HOSPITAL | Age: 65
End: 2025-02-18
Payer: MEDICARE

## 2025-02-18 DIAGNOSIS — S92.352A CLOSED DISPLACED FRACTURE OF FIFTH METATARSAL BONE OF LEFT FOOT, INITIAL ENCOUNTER: Primary | ICD-10-CM

## 2025-02-18 DIAGNOSIS — S92.352A CLOSED DISPLACED FRACTURE OF FIFTH METATARSAL BONE OF LEFT FOOT, INITIAL ENCOUNTER: ICD-10-CM

## 2025-02-18 DIAGNOSIS — R26.2 DIFFICULTY WALKING: Primary | ICD-10-CM

## 2025-02-18 PROCEDURE — 97112 NEUROMUSCULAR REEDUCATION: CPT | Mod: KX,PN | Performed by: PHYSICAL THERAPIST

## 2025-02-18 PROCEDURE — 99212 OFFICE O/P EST SF 10 MIN: CPT | Mod: PBBFAC,25,PO | Performed by: ORTHOPAEDIC SURGERY

## 2025-02-18 PROCEDURE — 73630 X-RAY EXAM OF FOOT: CPT | Mod: 26,LT,, | Performed by: RADIOLOGY

## 2025-02-18 PROCEDURE — 97530 THERAPEUTIC ACTIVITIES: CPT | Mod: KX,PN | Performed by: PHYSICAL THERAPIST

## 2025-02-18 PROCEDURE — 73630 X-RAY EXAM OF FOOT: CPT | Mod: TC,PO,LT

## 2025-02-18 NOTE — PROGRESS NOTES
Status/Diagnosis: Displaced Left 5th MT diaphyseal fracture.  Date of Surgery:   12/04/2024: Left 5th MT nonunion takedown & ORIF.  Date of Injury: 04/10/2024  Return visit: 1 month  X-rays on Return: WB 3-views Left foot      Present History:  Patient presents today via referral from No ref. provider found   Osbaldo Brewer is a 64 y.o. male with acute onset left lateral forefoot pain.  Patient actually endorses a syncopal episode that he relates to be dehydrated on 04/10/2024.  Immediate pain and swelling about the left foot.  X-rays taken at the emergency department were consistent with fracture.  He was placed into a splint with instructions for outpatient orthopedic follow-up.  Denies any prior syncopal episodes.  No foot pain or instability prior to the above.    Pain currently 0/10.  Denies any numbness or tingling.    Denies tobacco use.  Works as a .    POSTOP:  Patient doing well postop.  He has been full weight-bearing in his short cam boot for the last several days.  Working with physical therapy twice weekly.  3/10 pain.  And started to notice increased swelling with activity but this resolves with elevation overnight.      Past Medical History:   Diagnosis Date    Basal cell carcinoma     BPH associated with nocturia 01/11/2023    Erectile dysfunction 01/11/2023    History of Helicobacter pylori infection 01/11/2023    History of sinus surgery 01/11/2023    Mixed hyperlipidemia 08/29/2014       Past Surgical History:   Procedure Laterality Date    FUNCTIONAL ENDOSCOPIC SINUS SURGERY (FESS) USING COMPUTER-ASSISTED NAVIGATION Bilateral 3/31/2023    Procedure: SINUS SURGERY FUNCTIONAL ENDOSCOPIC WITH NAVIGATION;  Surgeon: Neville Navarrete MD;  Location: [facility] OR;  Service: ENT;  Laterality: Bilateral;    NASAL SEPTOPLASTY Bilateral 3/31/2023    Procedure: SEPTOPLASTY;  Surgeon: Neville Navarrete MD;  Location: [facility] OR;  Service: ENT;  Laterality: Bilateral;    OPEN REDUCTION AND  INTERNAL FIXATION (ORIF) OF FRACTURE OF METATARSAL BONE Left 12/4/2024    Procedure: ORIF, FRACTURE, METATARSAL BONE;  Surgeon: Milo Ramirez MD;  Location: [facility];  Service: Orthopedics;  Laterality: Left;  5TH METATARSAL NONUNION TAKEDOWN    SINUS SURGERY      WISDOM TOOTH EXTRACTION      states woke up during procedure       Current Outpatient Medications   Medication Sig    aspirin (ECOTRIN) 81 MG EC tablet Take 1 tablet (81 mg total) by mouth once daily.    atorvastatin (LIPITOR) 40 MG tablet Take 1 tablet (40 mg total) by mouth once daily.    azelastine (ASTELIN) 137 mcg (0.1 %) nasal spray 2 sprays (274 mcg total) by Nasal route 2 (two) times daily.    COQ10, UBIQUINOL, ORAL Take by mouth.    famotidine (PEPCID) 40 MG tablet Take 1 tablet (40 mg total) by mouth 2 (two) times daily.    fluorouraciL (EFUDEX) 5 % cream AAA face bid x 2-4 weeks    ipratropium (ATROVENT) 42 mcg (0.06 %) nasal spray 2 sprays by Each Nostril route 3 (three) times daily as needed (runny nose (rhinitis)).    ketoconazole (NIZORAL) 2 % cream Apply twice a day to affected areas of skin    traZODone (DESYREL) 150 MG tablet 1/3-1 tab PO qhs PRN insomnia     No current facility-administered medications for this visit.       Review of patient's allergies indicates:   Allergen Reactions    Oxycodone-acetaminophen Rash       No family history on file.    Social History     Socioeconomic History    Marital status:    Tobacco Use    Smoking status: Never    Smokeless tobacco: Never   Substance and Sexual Activity    Alcohol use: Yes     Comment: occ    Drug use: Never     Social Drivers of Health     Physical Activity: Unknown (6/21/2024)    Exercise Vital Sign     Days of Exercise per Week: 0 days     Minutes of Exercise per Session: Patient declined   Stress: Stress Concern Present (6/21/2024)    [employer] - Occupational Stress Questionnaire     Feeling of Stress : To some extent       Physical  exam:  There were no vitals filed for this visit.  There is no height or weight on file to calculate BMI.  General: In no apparent distress; well developed and well nourished.  HEENT: normocephalic; atraumatic.  Cardiovascular: regular rate.  Respiratory: no increased work of breathing.  Musculoskeletal:   Gait: did not assess  Inspection:   Surgical site with well healed scar.  No longer with the residual eschar.  Minimal residual swelling.  No warmth or erythema.  No signs or symptoms of infection.  Ankle range of motion from 5° dorsiflexion to 45° plantar flexion.  Gross motor and function intact.  Some altered sensation about the incision to be expected.  Brisk cap refill less than 2 seconds all 5 digits.       Imaging Studies/Outside documentation:  I have ordered/reviewed/interpreted the following images/outside documentation:  1. WB 3-views of Left foot:  Patient is status post left 5th metatarsal shaft nonunion takedown and ORIF.  Overall alignment well-maintained.  Continued callus formation and bony bridging are present. No evidence of implant loosening or failure.        Assessment:  Osbaldo Brewer is a 64 y.o. male with Displaced Left 5th MT diaphyseal fracture.  Left ankle deltoid injury; peroneal tendon strain.     Plan:   Clinical and radiographic findings were discussed.   Recommend continuation of physical therapy twice weekly, may renew as needed.    Patient to continue short cam boot wear.  He should be full weight-bearing in this for 7-10 days after which point he may then transition into a rigid soled postop shoe.    This was provided today.    Weightbear as tolerated left lower extremity.      Patient voiced understanding all questions were answered she will follow up in 1 month for repeat evaluation and x-ray.    We performed a custom orthotic/brace fitting, adjusting and training with the patient. The patient demonstrated understanding and proper care. This was performed for 15  minutes.    This note was created using voice recognition software and may contain grammatical errors.

## 2025-02-21 ENCOUNTER — CLINICAL SUPPORT (OUTPATIENT)
Dept: REHABILITATION | Facility: HOSPITAL | Age: 65
End: 2025-02-21
Payer: MEDICARE

## 2025-02-21 DIAGNOSIS — R26.2 DIFFICULTY WALKING: Primary | ICD-10-CM

## 2025-02-21 PROCEDURE — 97112 NEUROMUSCULAR REEDUCATION: CPT | Mod: KX,PN | Performed by: PHYSICAL THERAPIST

## 2025-02-21 PROCEDURE — 97530 THERAPEUTIC ACTIVITIES: CPT | Mod: KX,PN | Performed by: PHYSICAL THERAPIST

## 2025-02-21 NOTE — PROGRESS NOTES
[facility]   Physical Therapy Treatment Note     Name: Osbaldo Allenrington  Clinic Number: [id number]    Therapy Diagnosis:   Encounter Diagnosis   Name Primary?    Difficulty walking Yes     Physician: Milo Ramirez MD    Physician Orders: PT Eval and Treat   Medical Diagnosis from Referral: S92.352A (ICD-10-CM) - Closed displaced fracture of fifth metatarsal bone of left foot, initial encounter  Evaluation Date: 2/21/2025  Authorization Period Expiration: 12/31/2025  Plan of Care Expiration: 4/18/2025  Visit # / Visits authorized: 8/ 20   #Visits based on PHYSICAL THERAPY POC: 12-20  PTA visit #: 1 / 5    Foto  Date  Score      Foot   Lower Score = Greater Disability   #1/3 1/17/2025 20.0   #2/3 2/4/2025 21.0   #3/3       Time in  2:11pm   Time out 3:20pm   Total Appointment Time  69 minutes      Precautions: Standard  Date of Surgery: 12/04/2024  Pre-Operative Diagnosis:  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  Procedures:  Leftt 5th metatarsal malunion takedown and open reduction internal fixation.        STATUS POST Left 5th metatarsal nonunion takedown and ORIF.  Subjective   History of falls/MVAs: 1 fall while using crutches with no resulting injury     HOME EXERCISE PROGRAM  Reviewed, reports compliance, has been using hand to assist with moving his toes    Response  1 more week in the boot (until the 25th) and then return to [place] in March. Transition to black shoe that was provided once done with boot. Increase in B medial knee pain.    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), attend [employer], go grocery shopping , work on L knee weakness and bending it backwards, take pressure off of hips     Pain:      " Current 2/10     Location L 5th MET and ankle, tender over incision     Objective    2/18/2025:  Observation/posture: incision closed and healing      Gait/assistive device: no AD, walking boot donned      Range of Motion: AROM:  Ankle Right  Left  Left    Dorsiflexion 3 -5 ACTIVE RANGE OF MOTION, 0 PROM  1    Plantarflexion 54 35    35   Inversion 23 0 ACTIVE RANGE OF MOTION, 8 PROM  10   Eversion 8 4 ACTIVE RANGE OF MOTION, 4 PROM     8      Strength:  Ankle Right  Left  Left    Dorsiflexion 5/5 4/5     Plantarflexion 4+/5 4-/5    Inversion 4+/5 3/5    Eversion 4-/5 3/5       Joint Mobility:     Left   TCJ Hypomobile    STJ Hypomobile    METs Hypomobile       Function/balance:       Right Left   Single limb stance - tba tba   30" sit - stand   - -      Edema:     Right Left Left    Ankle figure of eight 59.0 cm 60.5 cm  60.0   Ankle joint line 29.5 cm 30.0 cm  31.0   METs 27.0 cm 27.0 cm 26.0        Treatment   + indicates new exercise   Bold indicates completed exercise    CHARGES BASED ON 1-1 TX:  therapeutic exercises to develop strength, endurance, ROM, flexibility, posture, and core stabilization for 24 minutes:  L towel swipes on slide board, INV/EV, #3 on towel, x 30 each way  Long sitting hamstring stretch, 30"x5   SLR, SL hip abd, SL hip add, prone hip ext x 10 each, review for HEP  L toe yoga, 3x10   L quad sets, 3" hold, 3x10  L ankle alphabet in capital letters, x 1   Toe splaying, 3x10   [place] , L foot x1 cup    manual therapy techniques: Joint mobilizations, Manual traction, Myofacial release, Soft tissue Mobilization, and Friction Massage for 10 minutes:  STM at L METs  L METs, TCJ, STJ Gr III/IV- JM   Decompression cupping at dorsal and plantar surface of L foot and along incision     neuromuscular re-education activities to improve: Balance, Coordination, Kinesthetic, Sense, Proprioception, and Posture for 25 minutes:  Seated L heel raise with #+25 dumbbell on top of L knee, 3x10  Seated " "in chair with 2 airex mats: L LE BAPS board on level 3, CW/CCW, 3x10  Seated in chair with 2 airex mats: B LE s +Fitter board, ant/post, 5" hold, 3x10   Seated L arch domes, 3" hold, x 15     therapeutic activities to improve functional performance for 10 minutes:  Level surface ambulation with walking boot   Part to whole task training with R hip flex, heel contact, step through pattern practice with unilateral HRA       gait training to improve functional mobility and safety for 00 minutes:      Home Exercises and Patient Education Provided  Education provided:   - yes    Home Exercises Provided: yes.  Exercises were reviewed and KAITLIN was able to demonstrate them prior to the end of the session.  KAITLIN demonstrated good  understanding of the education provided.     Assessment   Patient presents with impaired gait pattern during level surface ambulation with use of walking boot and no AD. He presents with improvement in L ankle/foot ACTIVE RANGE OF MOTION and overall edema since onset of PHYSICAL THERAPY POC.  Pain has decreased and active voluntary muscle activation has also improved with skilled services.     He continues with decreased ROM, muscle strength, flexibility, joint mobility. Increased pain, stiffness, soft tissue restriction. Impaired posture, joint mechanics, balance, gait pattern.     The patient's current task deficits include the following: limitation in ADL, self care, social/recreational tasks.     Patient prognosis: good  Rehab potential: good    Patient will benefit from skilled outpatient Physical Therapy to address the deficits stated above and in the chart below, provide patient /family education, to maximize patient's level of independence, and to address functional deficits.    Anticipated Barriers for therapy: chronicity of current injury and co morbidities     Goals:   Short Terms Goals: 3 weeks    Goal  Progress  Date    (1)  Patient will be I with HOME EXERCISE PROGRAM  " Progressing, Not Met 2/21/2025     (2)  Patient will obtain 3 deg R ankle DF ACTIVE RANGE OF MOTION to indicate improved ability to stand, > 60 minutes  Progressing, Not Met  2/21/2025     (3)   Patient will obtain 45 deg L ankle PF ACTIVE RANGE OF MOTION to indicate improved ability to complete walk, 60 minutes   Progressing, Not Met  2/21/2025       Long Term Goals: 6 weeks    Goal  Progress  Date    (1)  Patient will obtain 12 L ankle inversion ACTIVE RANGE OF MOTION to indicate improved ability to complete transitional movements into/our of vehicle  Progressing, Not Met  2/21/2025   (2)   Patient will obtain 8 deg L ankle eversion ACTIVE RANGE OF MOTION to indicate improved ability to to complete transitional movements into/out of boat  Progressing, Partially Met 2/21/2025    (3)   Patient will obtain 4/5 R ankle INV MMT to indicate improved ability to go grocery shopping  Progressing, Not Met  2/21/2025     (4)  Patient will obtain 4/5 R ankle EV MMT 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    Outpatient Physical Therapy 2 times weekly for 3 weeks to include the following interventions: Cervical/Lumbar Traction, Electrical Stimulation re-eval, dry needling, Gait Training, Iontophoresis (with ), Manual Therapy, Moist Heat/ Ice, Neuromuscular Re-ed, Patient Education, Self Care, Therapeutic Activities, and Therapeutic Exercise.     Physical therapist and physical therapy assistant(s) will met face to face to discuss patient's treatment plan and progress towards established goals. Pt will be seen by a physical therapist minimally every 6th visit or every 30 days.    Potential for KX modifier and additional visits due to subjective report, objective examination, co morbidities, nature of current condition and current functional status.     Benita Valentin, PT, DPT  2/24/2025        I CERTIFY THE NEED  FOR THESE SERVICES FURNISHED UNDER THIS PLAN OF TREATMENT AND WHILE UNDER MY CARE     Physician's comments:           Physician's Signature: ___________________________________________________

## 2025-03-07 ENCOUNTER — CLINICAL SUPPORT (OUTPATIENT)
Dept: REHABILITATION | Facility: HOSPITAL | Age: 65
End: 2025-03-07
Payer: MEDICARE

## 2025-03-07 DIAGNOSIS — R26.2 DIFFICULTY WALKING: Primary | ICD-10-CM

## 2025-03-07 PROCEDURE — 97140 MANUAL THERAPY 1/> REGIONS: CPT | Mod: KX,PN | Performed by: PHYSICAL THERAPIST

## 2025-03-07 PROCEDURE — 97110 THERAPEUTIC EXERCISES: CPT | Mod: KX,PN | Performed by: PHYSICAL THERAPIST

## 2025-03-07 NOTE — PROGRESS NOTES
[facility]   Physical Therapy Treatment Note     Name: [facility] Number: [id number]    Therapy Diagnosis:   Encounter Diagnosis   Name Primary?    Difficulty walking Yes       Physician: Milo Ramirez MD    Physician Orders: PT Eval and Treat   Medical Diagnosis from Referral: S92.352A (ICD-10-CM) - Closed displaced fracture of fifth metatarsal bone of left foot, initial encounter  Evaluation Date: 3/7/2025  Authorization Period Expiration: 12/31/2025  Plan of Care Expiration: 4/18/2025  Visit # / Visits authorized: 9/ 20   #Visits based on PHYSICAL THERAPY POC: 12-20  PTA visit #: 1 / 5    Foto  Date  Score      Foot   Lower Score = Greater Disability   #1/3 1/17/2025 20.0   #2/3 2/4/2025 21.0   #3/3 2/21/2025 39.0     Time in  2:05pm   Time out 3:00pm   Total Appointment Time  55 minutes      Precautions: Standard  Date of Surgery: 12/04/2024  Pre-Operative Diagnosis:  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  Procedures:  Leftt 5th metatarsal malunion takedown and open reduction internal fixation.        STATUS POST Left 5th metatarsal nonunion takedown and ORIF.  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 States that his lower leg turned red between visits and it is not sunburn and it also looks different from when his leg was red from wearing cast.    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), attend [employer], go grocery shopping , work on L knee weakness and bending it backwards, take pressure off of hips     Pain:      Current 3/10     Location L 5th MET and ankle, tender over incision     Objective    2/18/2025:  Observation/posture: incision closed and healing      Gait/assistive device: no AD, walking boot donned      Range of Motion: AROM:  Ankle Right  Left  Left    Dorsiflexion 3 -5 ACTIVE RANGE OF MOTION, 0 PROM  1    Plantarflexion 54 35    35   Inversion 23 0 ACTIVE RANGE OF MOTION, 8 PROM  10   Eversion 8 4 ACTIVE RANGE OF MOTION, 4 PROM     8      Strength:  Ankle Right  Left  Left    Dorsiflexion 5/5 4/5     Plantarflexion 4+/5 4-/5    Inversion 4+/5 3/5    Eversion 4-/5 3/5       Joint Mobility:     Left   TCJ Hypomobile    STJ Hypomobile    METs Hypomobile       Function/balance:       Right Left   Single limb stance - tba tba   30" sit - stand   - -      Edema:     Right Left Left    Ankle figure of eight 59.0 cm 60.5 cm  60.0   Ankle joint line 29.5 cm 30.0 cm  31.0   METs 27.0 cm 27.0 cm 26.0        Treatment   + indicates new exercise   Bold indicates completed exercise    CHARGES BASED ON 1-1 TX:  therapeutic exercises to develop strength, endurance, ROM, flexibility, posture, and core stabilization for 39 minutes:  L gastroc stretch with strap, 30" x 3   L soleus stretch with strap, 30"x3   +L ankle 4 way, red band, 3x10 each     manual therapy techniques: Joint mobilizations, Manual traction, Myofacial release, Soft tissue Mobilization, and Friction Massage for 10 minutes:  +STM at L plantar surface a foot with firm tool   L METs, TCJ, STJ Gr III/IV- JM   Decompression cupping at dorsal and plantar surface of L foot and along incision     neuromuscular re-education activities to improve: Balance, Coordination, Kinesthetic, Sense, Proprioception, and Posture for 6 minutes:  +R/L LE leading, tandem stand, airex mat, 30" x 3     therapeutic activities " to improve functional performance for 00 minutes:        gait training to improve functional mobility and safety for 00 minutes:      Home Exercises and Patient Education Provided  Education provided:   - yes    Home Exercises Provided: yes.  Exercises were reviewed and KAITLIN was able to demonstrate them prior to the end of the session.  KAITLIN demonstrated good  understanding of the education provided.     Assessment   L ankle 4 way using red band initiated today with good activation to targeted LE muscles. Tandem stand on airex mat meets patient's optimal challenge point.     He continues with decreased ROM, muscle strength, flexibility, joint mobility. Increased pain, stiffness, soft tissue restriction. Impaired posture, joint mechanics, balance, gait pattern.     The patient's current task deficits include the following: limitation in ADL, self care, social/recreational tasks.     Patient prognosis: good  Rehab potential: good    Patient will benefit from skilled outpatient Physical Therapy to address the deficits stated above and in the chart below, provide patient /family education, to maximize patient's level of independence, and to address functional deficits.    Anticipated Barriers for therapy: chronicity of current injury and co morbidities     Goals:   Short Terms Goals: 3 weeks    Goal  Progress  Date    (1)  Patient will be I with HOME EXERCISE PROGRAM  Progressing, Not Met 3/7/2025     (2)  Patient will obtain 3 deg R ankle DF ACTIVE RANGE OF MOTION to indicate improved ability to stand, > 60 minutes  Progressing, Not Met  3/7/2025     (3)   Patient will obtain 45 deg L ankle PF ACTIVE RANGE OF MOTION to indicate improved ability to complete walk, 60 minutes   Progressing, Not Met  3/7/2025       Long Term Goals: 6 weeks    Goal  Progress  Date    (1)  Patient will obtain 12 L ankle inversion ACTIVE RANGE OF MOTION to indicate improved ability to complete transitional movements into/our of vehicle   Progressing, Not Met  3/7/2025   (2)   Patient will obtain 8 deg L ankle eversion ACTIVE RANGE OF MOTION to indicate improved ability to to complete transitional movements into/out of boat  Progressing, Partially Met 3/7/2025    (3)   Patient will obtain 4/5 R ankle INV MMT to indicate improved ability to go grocery shopping  Progressing, Not Met  3/7/2025     (4)  Patient will obtain 4/5 R ankle EV MMT 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    Outpatient Physical Therapy including the following interventions: Cervical/Lumbar Traction, Electrical Stimulation re-eval, dry needling, Gait Training, Iontophoresis (with ), Manual Therapy, Moist Heat/ Ice, Neuromuscular Re-ed, Patient Education, Self Care, Therapeutic Activities, and Therapeutic Exercise.     Physical therapist and physical therapy assistant(s) will met face to face to discuss patient's treatment plan and progress towards established goals. Pt will be seen by a physical therapist minimally every 6th visit or every 30 days.    Potential for KX modifier and additional visits due to subjective report, objective examination, co morbidities, nature of current condition and current functional status.     Benita Valentin, PT, DPT  3/7/2025

## 2025-03-12 DIAGNOSIS — S92.352A CLOSED DISPLACED FRACTURE OF FIFTH METATARSAL BONE OF LEFT FOOT, INITIAL ENCOUNTER: Primary | ICD-10-CM

## 2025-03-14 ENCOUNTER — CLINICAL SUPPORT (OUTPATIENT)
Dept: REHABILITATION | Facility: HOSPITAL | Age: 65
End: 2025-03-14
Payer: MEDICARE

## 2025-03-14 DIAGNOSIS — R26.2 DIFFICULTY WALKING: Primary | ICD-10-CM

## 2025-03-14 PROCEDURE — 97112 NEUROMUSCULAR REEDUCATION: CPT | Mod: PN | Performed by: PHYSICAL THERAPIST

## 2025-03-14 PROCEDURE — 97530 THERAPEUTIC ACTIVITIES: CPT | Mod: PN | Performed by: PHYSICAL THERAPIST

## 2025-03-14 NOTE — PROGRESS NOTES
[facility]   Physical Therapy Treatment Note     Name: Osbaldo [facility]  Clinic Number: [id number]    Therapy Diagnosis:   Encounter Diagnosis   Name Primary?    Difficulty walking Yes       Physician: Milo Ramirez MD    Physician Orders: PT Eval and Treat   Medical Diagnosis from Referral: S92.352A (ICD-10-CM) - Closed displaced fracture of fifth metatarsal bone of left foot, initial encounter  Evaluation Date: 3/14/2025  Authorization Period Expiration: 12/31/2025  Plan of Care Expiration: 4/18/2025  Visit # / Visits authorized: 10/ 20   #Visits based on PHYSICAL THERAPY POC: 12-20  PTA visit #: 1 / 5    Foto  Date  Score      Foot   Lower Score = Greater Disability   #1/3 1/17/2025 20.0   #2/3 2/4/2025 21.0   #3/3 2/21/2025 39.0     Time in  10:07am   Time out 11:30am   Total Appointment Time  83 minutes      Precautions: Standard  Date of Surgery: 12/04/2024  Pre-Operative Diagnosis:  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  Procedures:  Leftt 5th metatarsal malunion takedown and open reduction internal fixation.        STATUS POST Left 5th metatarsal nonunion takedown and ORIF.  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), attend [employer], go grocery shopping , work on  "L knee weakness and bending it backwards, take pressure off of hips     Pain:      Current 3/10     Location L 5th MET and ankle, tender over incision     Objective    2/18/2025:  Observation/posture: incision closed and healing      Gait/assistive device: no AD, walking boot donned      Range of Motion: AROM:  Ankle Right  Left  Left    Dorsiflexion 3 -5 ACTIVE RANGE OF MOTION, 0 PROM  1    Plantarflexion 54 35    35   Inversion 23 0 ACTIVE RANGE OF MOTION, 8 PROM  10   Eversion 8 4 ACTIVE RANGE OF MOTION, 4 PROM     8      Strength:  Ankle Right  Left  Left    Dorsiflexion 5/5 4/5     Plantarflexion 4+/5 4-/5    Inversion 4+/5 3/5    Eversion 4-/5 3/5       Joint Mobility:     Left   TCJ Hypomobile    STJ Hypomobile    METs Hypomobile       Function/balance:       Right Left   Single limb stance - tba tba   30" sit - stand   - -      Edema:     Right Left Left    Ankle figure of eight 59.0 cm 60.5 cm  60.0   Ankle joint line 29.5 cm 30.0 cm  31.0   METs 27.0 cm 27.0 cm 26.0        Treatment   + indicates new exercise   Bold indicates completed exercise    CHARGES BASED ON 1-1 TX:  therapeutic exercises to develop strength, endurance, ROM, flexibility, posture, and core stabilization for 53 minutes:  L gastroc stretch with strap, 30" x 3   L soleus stretch with strap, 30"x3   +L ankle inversion, red band, 3x10 each   +Standing L hip SLR 4 way, red band, 3x10  +STM with lacrosse ball under L foot x 3'  +L toe splaying    manual therapy techniques: Joint mobilizations, Manual traction, Myofacial release, Soft tissue Mobilization, and Friction Massage for 12 minutes:  STM at L plantar surface a foot with firm tool   L METs, TCJ Gr III/IV JM   Decompression cupping at dorsal and plantar surface of L foot and along incision     neuromuscular re-education activities to improve: Balance, Coordination, Kinesthetic, Sense, Proprioception, and Posture for 10 minutes:  R/L LE leading, tandem stand, airex mat, 30" x 3   +L " "ankle/foot circles CCW, 3x10   +L arch domes, 5" hold, 3x10    therapeutic activities to improve functional performance for 8 minutes:  +Written HOME EXERCISE PROGRAM updated, reviewed, patient demo understanding       gait training to improve functional mobility and safety for 00 minutes:      Home Exercises and Patient Education Provided  Education provided:   - yes    Home Exercises Provided: yes.  Exercises were reviewed and KAITLIN was able to demonstrate them prior to the end of the session.  KAITLIN demonstrated good  understanding of the education provided.     Assessment   L hip standing 4 way SLR using red band initiated today with good activation to targeted LE muscles and noted fatigue at R hip. Emphasis on supination of foot via arch doming, ankle inversion using resistance band, circles on board and toe splaying today.     He continues with decreased ROM, muscle strength, flexibility, joint mobility. Increased pain, stiffness, soft tissue restriction. Impaired posture, joint mechanics, balance, gait pattern.     The patient's current task deficits include the following: limitation in ADL, self care, social/recreational tasks.     Patient prognosis: good  Rehab potential: good    Patient will benefit from skilled outpatient Physical Therapy to address the deficits stated above and in the chart below, provide patient /family education, to maximize patient's level of independence, and to address functional deficits.    Anticipated Barriers for therapy: chronicity of current injury and co morbidities     Goals:   Short Terms Goals: 3 weeks    Goal  Progress  Date    (1)  Patient will be I with HOME EXERCISE PROGRAM  Progressing, Not Met 3/14/2025     (2)  Patient will obtain 3 deg R ankle DF ACTIVE RANGE OF MOTION to indicate improved ability to stand, > 60 minutes  Progressing, Not Met  3/14/2025     (3)   Patient will obtain 45 deg L ankle PF ACTIVE RANGE OF MOTION to indicate improved ability to complete " walk, 60 minutes   Progressing, Not Met  3/14/2025       Long Term Goals: 6 weeks    Goal  Progress  Date    (1)  Patient will obtain 12 L ankle inversion ACTIVE RANGE OF MOTION to indicate improved ability to complete transitional movements into/our of vehicle  Progressing, Not Met  3/14/2025   (2)   Patient will obtain 8 deg L ankle eversion ACTIVE RANGE OF MOTION to indicate improved ability to to complete transitional movements into/out of boat  Progressing, Partially Met 3/14/2025    (3)   Patient will obtain 4/5 R ankle INV MMT to indicate improved ability to go grocery shopping  Progressing, Not Met  3/14/2025     (4)  Patient will obtain 4/5 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    Outpatient Physical Therapy including the following interventions: Cervical/Lumbar Traction, Electrical Stimulation re-eval, dry needling, Gait Training, Iontophoresis (with ), Manual Therapy, Moist Heat/ Ice, Neuromuscular Re-ed, Patient Education, Self Care, Therapeutic Activities, and Therapeutic Exercise.     Physical therapist and physical therapy assistant(s) will met face to face to discuss patient's treatment plan and progress towards established goals. Pt will be seen by a physical therapist minimally every 6th visit or every 30 days.    Potential for KX modifier and additional visits due to subjective report, objective examination, co morbidities, nature of current condition and current functional status.     Benita Valentin, PT, DPT  3/14/2025

## 2025-03-18 ENCOUNTER — HOSPITAL ENCOUNTER (OUTPATIENT)
Dept: RADIOLOGY | Facility: HOSPITAL | Age: 65
Discharge: HOME | End: 2025-03-18
Attending: ORTHOPAEDIC SURGERY
Payer: MEDICARE

## 2025-03-18 ENCOUNTER — OFFICE VISIT (OUTPATIENT)
Dept: ORTHOPEDICS | Facility: CLINIC | Age: 65
End: 2025-03-18
Payer: MEDICARE

## 2025-03-18 VITALS — WEIGHT: 220 LBS | HEIGHT: 75 IN | BODY MASS INDEX: 27.35 KG/M2

## 2025-03-18 DIAGNOSIS — S92.352A CLOSED DISPLACED FRACTURE OF FIFTH METATARSAL BONE OF LEFT FOOT, INITIAL ENCOUNTER: ICD-10-CM

## 2025-03-18 DIAGNOSIS — S92.352A CLOSED DISPLACED FRACTURE OF FIFTH METATARSAL BONE OF LEFT FOOT, INITIAL ENCOUNTER: Primary | ICD-10-CM

## 2025-03-18 PROCEDURE — 73630 X-RAY EXAM OF FOOT: CPT | Mod: 26,LT,, | Performed by: RADIOLOGY

## 2025-03-18 PROCEDURE — 73630 X-RAY EXAM OF FOOT: CPT | Mod: TC,PO,LT

## 2025-03-18 PROCEDURE — 99212 OFFICE O/P EST SF 10 MIN: CPT | Mod: PBBFAC,25,PO | Performed by: ORTHOPAEDIC SURGERY

## 2025-03-18 PROCEDURE — 99999 PR PBB SHADOW E&M-EST. PATIENT-LVL II: CPT | Mod: PBBFAC,,, | Performed by: ORTHOPAEDIC SURGERY

## 2025-03-18 PROCEDURE — 99213 OFFICE O/P EST LOW 20 MIN: CPT | Mod: S$PBB,,, | Performed by: ORTHOPAEDIC SURGERY

## 2025-03-20 ENCOUNTER — TELEPHONE (OUTPATIENT)
Dept: CARDIOLOGY | Facility: CLINIC | Age: 65
End: 2025-03-20
Payer: MEDICARE

## 2025-03-20 ENCOUNTER — CLINICAL SUPPORT (OUTPATIENT)
Dept: REHABILITATION | Facility: HOSPITAL | Age: 65
End: 2025-03-20
Payer: MEDICARE

## 2025-03-20 DIAGNOSIS — R26.2 DIFFICULTY WALKING: Primary | ICD-10-CM

## 2025-03-20 PROCEDURE — 97112 NEUROMUSCULAR REEDUCATION: CPT

## 2025-03-20 PROCEDURE — 97140 MANUAL THERAPY 1/> REGIONS: CPT

## 2025-03-20 NOTE — PROGRESS NOTES
"  Outpatient Rehab    Physical Therapy Visit    Patient Name: KAITLIN Brewer  MRN: [id number]  YOB: 1960  Encounter Date: 3/20/2025    Therapy Diagnosis:   Encounter Diagnosis   Name Primary?    Difficulty walking Yes     Physician: Milo Ramirez MD    Physician Orders: Eval and Treat  Medical Diagnosis: Closed displaced fracture of fifth metatarsal bone of left foot, initial encounter    Visit # / Visits Authorized:  12 / 20  Insurance Authorization Period: 1/1/2025 to 12/31/2025     PT/PTA:     Number of PTA visits since last PT visit:   Time In: 1230   Time Out: 1330  Total Time: 60   Total Billable Time: 30    FOTO:  Intake Score:  %  Survey Score 1:  %  Survey Score 2:  %         Subjective   Patient reports improvement in swelling since having the cupping done. He is able to move the ankle and toes a lot better. He continues to wear the post-surgical shoe..  Pain reported as 4/10.      Objective            Treatment:  Therapeutic Exercise  TE 1: gastroc stretch with strap 3 x 30"H --> progress to wedge next visit  TE 2: soleus stretch with strap 3 x 30"H --> progress to wedge next visit  TE 3: ankle inversion with RTB 3 x 10  TE 4: Standing hip 4 way, RTB 3 x 10 (next visit)  TE 5: STM with knobby ball x 3 min  TE 6: L toe splaying x 15  Manual Therapy  MT 1: STM to L foot and ankle with focus on improving edema  MT 2: Gr III-IV Talocrural joint mobs, Gr III calcaneal mobs  MT 3: Decompression cupping at dorsal surface of foot, along incision, and into anterior lower leg  Balance/Neuromuscular Re-Education  NMR 1: towel crunches x 2 min  NMR 2: Towel inversion/eversion 5 x 5  NMR 3: Dynadisk: A/P; M/L; CW; CCW x 2 mins ea  NMR 4: DF/PF on 1/2 foam roll x 3 min  NMR 5: L arch domes 5"H x 15    Time Entry(in minutes):  Manual Therapy Time Entry: 16  Neuromuscular Re-Education Time Entry: 14    Assessment & Plan   Assessment: Patient with good tolernace to treatment. Improvement noted " in edema following manual techniques. Patient with no provocation of symptoms during treatment and was able to progress several exs and acts  Evaluation/Treatment Tolerance: Patient tolerated treatment well    Patient will continue to benefit from skilled outpatient physical therapy to address the deficits listed in the problem list box on initial evaluation, provide pt/family education and to maximize pt's level of independence in the home and community environment.     Patient's spiritual, cultural, and educational needs considered and patient agreeable to plan of care and goals.     Education  Education was done with Patient. The patient's learning style includes Listening. The patient Verbalizes understanding.         Cupping with manual techniques performed to left foot decrease muscle tightness, increase circulation, and promote healing process. Patient's skin was monitored for redness, adjusting pressure as needed. Pt was instructed in possible side effects of discoloration, bullae formation, histological changes in the skin, and/or soreness        Plan: Continue to advance patient as tolerated per POC for progress toward LTGs    Goals:   Active       Physical Therapy       (1)  Patient will be I with HOME EXERCISE PROGRAM       (2)  Patient will obtain 3 deg R ankle DF ACTIVE RANGE OF MOTION to indicate improved ability to stand, > 60 minutes       (3)   Patient will obtain 45 deg L ankle PF ACTIVE RANGE OF MOTION to indicate improved ability to complete walk, 60 minutes           Long Term Goals: 6 weeks       (1)  Patient will obtain 12 L ankle inversion ACTIVE RANGE OF MOTION to indicate improved ability to complete transitional movements into/our of vehicle   (2)   Patient will obtain 8 deg L ankle eversion ACTIVE RANGE OF MOTION to indicate improved ability to to complete transitional movements into/out of boat   (3)   Patient will obtain 4/5 R ankle INV MMT to indicate improved ability to go grocery  shopping    (4)  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

## 2025-03-20 NOTE — TELEPHONE ENCOUNTER
Lvm for pt in regards to r/s appt on 4/25 due Dr. Craig not being in clinic during that time. Asked to call us back to discuss dates and times to r/s appt.

## 2025-04-01 NOTE — PROGRESS NOTES
Status/Diagnosis: Displaced Left 5th MT diaphyseal fracture.  Date of Surgery:   12/04/2024: Left 5th MT nonunion takedown & ORIF.  Date of Injury: 04/10/2024  Return visit: 6 weeks  X-rays on Return: WB 3-views Left foot      Present History:  Patient presents today via referral from No ref. provider found   Osbaldo Brewer is a 64 y.o. male with acute onset left lateral forefoot pain.  Patient actually endorses a syncopal episode that he relates to be dehydrated on 04/10/2024.  Immediate pain and swelling about the left foot.  X-rays taken at the emergency department were consistent with fracture.  He was placed into a splint with instructions for outpatient orthopedic follow-up.  Denies any prior syncopal episodes.  No foot pain or instability prior to the above.    Pain currently 0/10.  Denies any numbness or tingling.    Denies tobacco use.  Works as a .    POSTOP:  Patient doing well postop.  1/10 pain.  Occasionally uses a single crutch of the end of the day after being up on his feet for long periods.    Increased swelling but improved overnight.  No new injuries.      Past Medical History:   Diagnosis Date    Basal cell carcinoma     BPH associated with nocturia 01/11/2023    Erectile dysfunction 01/11/2023    History of Helicobacter pylori infection 01/11/2023    History of sinus surgery 01/11/2023    Mixed hyperlipidemia 08/29/2014       Past Surgical History:   Procedure Laterality Date    FUNCTIONAL ENDOSCOPIC SINUS SURGERY (FESS) USING COMPUTER-ASSISTED NAVIGATION Bilateral 3/31/2023    Procedure: SINUS SURGERY FUNCTIONAL ENDOSCOPIC WITH NAVIGATION;  Surgeon: Neville Navarrete MD;  Location: [facility] OR;  Service: ENT;  Laterality: Bilateral;    NASAL SEPTOPLASTY Bilateral 3/31/2023    Procedure: SEPTOPLASTY;  Surgeon: Neville Navarrete MD;  Location: [facility] OR;  Service: ENT;  Laterality: Bilateral;    OPEN REDUCTION AND INTERNAL FIXATION (ORIF) OF FRACTURE OF METATARSAL BONE Left  12/4/2024    Procedure: ORIF, FRACTURE, METATARSAL BONE;  Surgeon: Milo Ramirez MD;  Location: [facility];  Service: Orthopedics;  Laterality: Left;  5TH METATARSAL NONUNION TAKEDOWN    SINUS SURGERY      WISDOM TOOTH EXTRACTION      states woke up during procedure       Current Outpatient Medications   Medication Sig    aspirin (ECOTRIN) 81 MG EC tablet Take 1 tablet (81 mg total) by mouth once daily.    atorvastatin (LIPITOR) 40 MG tablet Take 1 tablet (40 mg total) by mouth once daily.    azelastine (ASTELIN) 137 mcg (0.1 %) nasal spray 2 sprays (274 mcg total) by Nasal route 2 (two) times daily.    COQ10, UBIQUINOL, ORAL Take by mouth.    famotidine (PEPCID) 40 MG tablet Take 1 tablet (40 mg total) by mouth 2 (two) times daily.    fluorouraciL (EFUDEX) 5 % cream AAA face bid x 2-4 weeks    ipratropium (ATROVENT) 42 mcg (0.06 %) nasal spray 2 sprays by Each Nostril route 3 (three) times daily as needed (runny nose (rhinitis)).    ketoconazole (NIZORAL) 2 % cream Apply twice a day to affected areas of skin    traZODone (DESYREL) 150 MG tablet 1/3-1 tab PO qhs PRN insomnia     No current facility-administered medications for this visit.       Review of patient's allergies indicates:   Allergen Reactions    Oxycodone-acetaminophen Rash       No family history on file.    Social History     Socioeconomic History    Marital status:    Tobacco Use    Smoking status: Never    Smokeless tobacco: Never   Substance and Sexual Activity    Alcohol use: Yes     Comment: occ    Drug use: Never     Social Drivers of Health     Physical Activity: Unknown (6/21/2024)    Exercise Vital Sign     Days of Exercise per Week: 0 days     Minutes of Exercise per Session: Patient declined   Stress: Stress Concern Present (6/21/2024)    [employer] - Occupational Stress Questionnaire     Feeling of Stress : To some extent       Physical exam:  There were no vitals filed for this visit.  There is no  height or weight on file to calculate BMI.  General: In no apparent distress; well developed and well nourished.  HEENT: normocephalic; atraumatic.  Cardiovascular: regular rate.  Respiratory: no increased work of breathing.  Musculoskeletal:   Gait: did not assess  Inspection:   Surgical site with well healed scar.  No residual eschar.  Mild residual swelling.  No warmth or erythema.  No signs or symptoms of infection.  Ankle range of motion from 5° dorsiflexion to 45° plantar flexion.  Gross motor and function intact.  Some altered sensation about the incision to be expected.  Brisk cap refill less than 2 seconds all 5 digits.       Imaging Studies/Outside documentation:  I have ordered/reviewed/interpreted the following images/outside documentation:  1. WB 3-views of Left foot:  Patient is status post left 5th metatarsal shaft nonunion takedown and ORIF.  Overall alignment well-maintained.  Continued interval bony bridging with near-complete fracture healing.  No evidence of implant loosening or failure.        Assessment:  Osbaldo Brewer is a 64 y.o. male with Displaced Left 5th MT diaphyseal fracture.  Left ankle deltoid injury; peroneal tendon strain.     Plan:   Clinical and radiographic findings were discussed.   Okay to transition in normal shoe wear with rigid soled inserts in place, carbon fiber handout provided today.    Weightbear as tolerated left lower extremity.  No high impact activities.  Okay for ADLs.    Patient informed to let pain be his guide.    Continue ice and elevation of the end of the day.    Follow up in 6 weeks for repeat evaluation, or sooner if needed.    Patient voiced understanding all questions were answered.      This note was created using voice recognition software and may contain grammatical errors.

## 2025-04-04 ENCOUNTER — TELEPHONE (OUTPATIENT)
Dept: CARDIOLOGY | Facility: CLINIC | Age: 65
End: 2025-04-04
Payer: MEDICARE

## 2025-04-04 NOTE — TELEPHONE ENCOUNTER
2nd attempt to contact pt. Lvm for pt in regards to r/s appt on 4/25 due to Dr. Craig not being in clinic. Asked to call us back or reach out to us through the to discuss times and dates for r/s

## 2025-04-16 ENCOUNTER — CLINICAL SUPPORT (OUTPATIENT)
Dept: REHABILITATION | Facility: HOSPITAL | Age: 65
End: 2025-04-16
Payer: MEDICARE

## 2025-04-16 DIAGNOSIS — R26.2 DIFFICULTY WALKING: Primary | ICD-10-CM

## 2025-04-16 PROCEDURE — 97112 NEUROMUSCULAR REEDUCATION: CPT

## 2025-04-16 PROCEDURE — 97110 THERAPEUTIC EXERCISES: CPT

## 2025-04-16 NOTE — PROGRESS NOTES
"  Outpatient Rehab    Physical Therapy Visit    Patient Name: KAITLIN Brewer  MRN: [id number]  YOB: 1960  Encounter Date: 4/16/2025    Therapy Diagnosis:   Encounter Diagnosis   Name Primary?    Difficulty walking Yes     Physician: Milo Ramirez MD    Physician Orders: Eval and Treat  Medical Diagnosis: Closed displaced fracture of fifth metatarsal bone of left foot, initial encounter    Visit # / Visits Authorized:  13 / 20  Insurance Authorization Period: 1/1/2025 to 12/31/2025  Date of Evaluation: 3/20/2025  Plan of Care Certification: 3/20/2025 to 5/15/2025     PT/PTA:     Number of PTA visits since last PT visit:   Time In:     Time Out:    Total Time:     Total Billable Time:      FOTO:  Intake Score:  %  Survey Score 1:  %  Survey Score 2:  %         Subjective   Reports continual edema every day; difficulty walking..         Objective            Treatment:  Therapeutic Exercise  TE 1: gastroc stretch with strap 3 x 30"H  TE 2: soleus stretch with strap 3 x 30"H  TE 3: ankle inversion with RTB 3 x 10  TE 4: Standing hip 4 way, RTB 3 x 10 (next visit)  TE 5: STM with knobby ball x 3 min  TE 6: L toe splaying x 15  TE 7: Gastroc stretch on wedge 4 x 30 seconds  TE 8: Soleus stretch on wedge 4 x 30 seconds  Manual Therapy  MT 1: TC a/p and p/a mobilizations II, III  MT 2: Great toe extension mobs II, III  Balance/Neuromuscular Re-Education  NMR 1: towel crunches x 2 min  NMR 2: Towel inversion/eversion 5 x 5  NMR 3: Dynadisk: A/P; M/L; CW; CCW x 2 mins ea  NMR 4: DF/PF on 1/2 foam roll x 3 min  NMR 5: L arch domes 5"H x 15  NMR 6: Airex foam 5 x 20 seconds static and dynamic with trunk rotation    Time Entry(in minutes):       Assessment & Plan   Assessment: Good response with balance training; no increased symptoms and impaired balance and ankle stability noted. Gross ROM and strength limitations noted throughout LE; progress functional strength and mobility as tolerated moving " forward with treatment.       Patient will continue to benefit from skilled outpatient physical therapy to address the deficits listed in the problem list box on initial evaluation, provide pt/family education and to maximize pt's level of independence in the home and community environment.     Patient's spiritual, cultural, and educational needs considered and patient agreeable to plan of care and goals.           Plan:      Goals:   Active       Physical Therapy       (1)  Patient will be I with HOME EXERCISE PROGRAM       (2)  Patient will obtain 3 deg R ankle DF ACTIVE RANGE OF MOTION to indicate improved ability to stand, > 60 minutes       (3)   Patient will obtain 45 deg L ankle PF ACTIVE RANGE OF MOTION to indicate improved ability to complete walk, 60 minutes           Long Term Goals: 6 weeks       (1)  Patient will obtain 12 L ankle inversion ACTIVE RANGE OF MOTION to indicate improved ability to complete transitional movements into/our of vehicle   (2)   Patient will obtain 8 deg L ankle eversion ACTIVE RANGE OF MOTION to indicate improved ability to to complete transitional movements into/out of boat   (3)   Patient will obtain 4/5 R ankle INV MMT to indicate improved ability to go grocery shopping    (4)  Patient will obtain 4/5 R ankle EV MMT 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

## 2025-04-23 ENCOUNTER — OFFICE VISIT (OUTPATIENT)
Dept: FAMILY MEDICINE | Facility: CLINIC | Age: 65
End: 2025-04-23
Payer: MEDICARE

## 2025-04-23 VITALS
TEMPERATURE: 98 F | HEIGHT: 75 IN | DIASTOLIC BLOOD PRESSURE: 68 MMHG | SYSTOLIC BLOOD PRESSURE: 124 MMHG | OXYGEN SATURATION: 97 % | HEART RATE: 79 BPM | BODY MASS INDEX: 28.32 KG/M2 | WEIGHT: 227.75 LBS

## 2025-04-23 DIAGNOSIS — E78.2 MIXED HYPERLIPIDEMIA: ICD-10-CM

## 2025-04-23 DIAGNOSIS — D83.0 COMMON VARIABLE IMMUNODEFICIENCY WITH PREDOMINANT ABNORMALITIES OF B-CELL NUMBERS AND FUNCTION: ICD-10-CM

## 2025-04-23 DIAGNOSIS — R79.89 ELEVATED TSH: ICD-10-CM

## 2025-04-23 DIAGNOSIS — Z12.5 SCREENING FOR PROSTATE CANCER: ICD-10-CM

## 2025-04-23 DIAGNOSIS — R53.83 FATIGUE, UNSPECIFIED TYPE: ICD-10-CM

## 2025-04-23 DIAGNOSIS — S92.352A CLOSED DISPLACED FRACTURE OF FIFTH METATARSAL BONE OF LEFT FOOT, INITIAL ENCOUNTER: Primary | ICD-10-CM

## 2025-04-23 DIAGNOSIS — Z12.11 SCREEN FOR COLON CANCER: ICD-10-CM

## 2025-04-23 DIAGNOSIS — N40.1 BPH ASSOCIATED WITH NOCTURIA: ICD-10-CM

## 2025-04-23 DIAGNOSIS — R35.1 BPH ASSOCIATED WITH NOCTURIA: ICD-10-CM

## 2025-04-23 DIAGNOSIS — E78.00 HYPERCHOLESTEROLEMIA: ICD-10-CM

## 2025-04-23 DIAGNOSIS — Z00.00 ANNUAL PHYSICAL EXAM: Primary | ICD-10-CM

## 2025-04-23 PROCEDURE — 99214 OFFICE O/P EST MOD 30 MIN: CPT | Mod: PBBFAC,PO | Performed by: FAMILY MEDICINE

## 2025-04-23 PROCEDURE — G2211 COMPLEX E/M VISIT ADD ON: HCPCS | Mod: S$PBB,,, | Performed by: FAMILY MEDICINE

## 2025-04-23 PROCEDURE — 99999 PR PBB SHADOW E&M-EST. PATIENT-LVL IV: CPT | Mod: PBBFAC,,, | Performed by: FAMILY MEDICINE

## 2025-04-23 PROCEDURE — 99214 OFFICE O/P EST MOD 30 MIN: CPT | Mod: S$PBB,,, | Performed by: FAMILY MEDICINE

## 2025-04-23 RX ORDER — TAMSULOSIN HYDROCHLORIDE 0.4 MG/1
0.4 CAPSULE ORAL DAILY
COMMUNITY
Start: 2025-03-10 | End: 2025-04-23 | Stop reason: SDUPTHER

## 2025-04-23 RX ORDER — ATORVASTATIN CALCIUM 40 MG/1
40 TABLET, FILM COATED ORAL DAILY
Qty: 90 TABLET | Refills: 3 | Status: SHIPPED | OUTPATIENT
Start: 2025-04-23

## 2025-04-23 RX ORDER — TAMSULOSIN HYDROCHLORIDE 0.4 MG/1
0.4 CAPSULE ORAL DAILY
Qty: 90 CAPSULE | Refills: 3 | Status: SHIPPED | OUTPATIENT
Start: 2025-04-23

## 2025-04-23 NOTE — PROGRESS NOTES
"Subjective:       Patient ID: Osbaldo Brewer is a 64 y.o. male.    Chief Complaint: Annual Exam    Here today for his annual visit.   Immunizations: Due Shingrix, Tdap and RSV  Last Lab Work: 2024  Colon Ca screening: Colonoscopy due 2025  Prostate Ca Screening: PSA 5/2/24     HLD: He is on Lipitor 40 mg.  Cholesterol was in range 5/2024.  He saw cardiologist here.    Chronic Sinusitis: Seeing ENT.  S/p sinus surgery 3/2023.  Culture positive for Pseudomonas.  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   HENT:  Negative for congestion, sneezing and sore throat.    Respiratory:  Negative for cough, shortness of breath and wheezing.    Cardiovascular:  Negative for chest pain and palpitations.   Gastrointestinal:  Negative for abdominal pain, constipation, diarrhea, nausea and vomiting.   Genitourinary:  Negative for difficulty urinating, dysuria, frequency and hematuria.   Neurological:  Negative for dizziness, syncope, weakness and headaches.   Psychiatric/Behavioral:  Negative for agitation, behavioral problems and confusion. The patient is not nervous/anxious.        Objective:      Vitals:    04/23/25 1456   BP: 124/68   Pulse: 79   Temp: 97.8 °F (36.6 °C)   SpO2: 97%   Weight: 103.3 kg (227 lb 11.8 oz)   Height: 6' 3" (1.905 m)      Physical Exam  Constitutional:       General: He is not in acute distress.  Cardiovascular:      Rate and Rhythm: Normal rate and regular rhythm.      Heart sounds: Normal heart sounds. No murmur heard.  Pulmonary:      Effort: Pulmonary effort is normal. No respiratory distress.      Breath sounds: Normal breath sounds. No wheezing, rhonchi or rales.   Musculoskeletal:         General: No swelling.   Skin:     General: Skin is warm and dry.   Neurological:      General: No focal deficit present.      Mental Status: He is alert.   Psychiatric:         Mood and Affect: Mood normal.    "      Behavior: Behavior normal.         Thought Content: Thought content normal.         Results for orders placed or performed in visit on 11/19/24   CBC Auto Differential    Collection Time: 11/19/24 10:46 AM   Result Value Ref Range    WBC 6.14 3.90 - 12.70 K/uL    RBC 4.62 4.60 - 6.20 M/uL    Hemoglobin 13.9 (L) 14.0 - 18.0 g/dL    Hematocrit 43.0 40.0 - 54.0 %    MCV 93 82 - 98 fL    MCH 30.1 27.0 - 31.0 pg    MCHC 32.3 32.0 - 36.0 g/dL    RDW 13.4 11.5 - 14.5 %    Platelets 236 150 - 450 K/uL    MPV 10.8 9.2 - 12.9 fL    Immature Granulocytes 0.2 0.0 - 0.5 %    Gran # (ANC) 3.1 1.8 - 7.7 K/uL    Immature Grans (Abs) 0.01 0.00 - 0.04 K/uL    Lymph # 1.9 1.0 - 4.8 K/uL    Mono # 0.7 0.3 - 1.0 K/uL    Eos # 0.4 0.0 - 0.5 K/uL    Baso # 0.05 0.00 - 0.20 K/uL    nRBC 0 0 /100 WBC    Gran % 50.4 38.0 - 73.0 %    Lymph % 30.3 18.0 - 48.0 %    Mono % 12.1 4.0 - 15.0 %    Eosinophil % 6.2 0.0 - 8.0 %    Basophil % 0.8 0.0 - 1.9 %    Differential Method Automated    Comprehensive Metabolic Panel    Collection Time: 11/19/24 10:46 AM   Result Value Ref Range    Sodium 139 136 - 145 mmol/L    Potassium 5.5 (H) 3.5 - 5.1 mmol/L    Chloride 107 95 - 110 mmol/L    CO2 26 23 - 29 mmol/L    Glucose 92 70 - 110 mg/dL    BUN 14 8 - 23 mg/dL    Creatinine 1.0 0.5 - 1.4 mg/dL    Calcium 9.5 8.7 - 10.5 mg/dL    Total Protein 7.1 6.0 - 8.4 g/dL    Albumin 3.9 3.5 - 5.2 g/dL    Total Bilirubin 0.4 0.1 - 1.0 mg/dL    Alkaline Phosphatase 81 40 - 150 U/L    AST 21 10 - 40 U/L    ALT 19 10 - 44 U/L    eGFR >60.0 >60 mL/min/1.73 m^2    Anion Gap 6 (L) 8 - 16 mmol/L      Assessment:       1. Annual physical exam    2. Hypercholesterolemia    3. BPH associated with nocturia    4. Screening for prostate cancer    5. 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 Elevated TSH    7. Fatigue, unspecified type    8. Screen for colon cancer    9. Mixed hyperlipidemia        Plan:       Annual  physical exam  Continue to work on dietary improvements (decrease overall calorie intake, decrease sugar and carb intake, decrease animal protein intake)  Continue to exercise at least 30-40 minutes, 3 times per week  Immunizations were discussed and will look into getting at the pharmacy  Preventative exams were discussed and labs with PSA ordered.  Referral to GI (debbie) for colonoscopy  Hypercholesterolemia  -     Comprehensive Metabolic Panel; Future; Expected date: 04/23/2025  -     Lipid Panel; Future; Expected date: 04/23/2025  Continue Lipitor  BPH associated with nocturia  -     tamsulosin (FLOMAX) 0.4 mg Cap; Take 1 capsule (0.4 mg total) by mouth once daily.  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  Dispense: 90 tablet; Refill: 3    Visit today included increased complexity associated with the care of the episodic problem HLD addressed and managing the longitudinal care of the patient due to the serious and/or complex managed problem(s) as above.       Medication List with Changes/Refills   Current Medications    ASPIRIN (ECOTRIN) 81 MG EC TABLET    Take 1 tablet (81 mg total) by mouth once daily.    AZELASTINE (ASTELIN) 137 MCG (0.1 %) NASAL SPRAY    2 sprays (274 mcg total) by Nasal route 2 (two) times daily.    COQ10, UBIQUINOL, ORAL    Take by mouth.    FAMOTIDINE  (PEPCID) 40 MG TABLET    Take 1 tablet (40 mg total) by mouth 2 (two) times daily.    FLUOROURACIL (EFUDEX) 5 % CREAM    AAA face bid x 2-4 weeks    IPRATROPIUM (ATROVENT) 42 MCG (0.06 %) NASAL SPRAY    2 sprays by Each Nostril route 3 (three) times daily as needed (runny nose (rhinitis)).    KETOCONAZOLE (NIZORAL) 2 % CREAM    Apply twice a day to affected areas of skin    TRAZODONE (DESYREL) 150 MG TABLET    1/3-1 tab PO qhs PRN insomnia   Changed and/or Refilled Medications    Modified Medication Previous Medication    ATORVASTATIN (LIPITOR) 40 MG TABLET atorvastatin (LIPITOR) 40 MG tablet       Take 1 tablet (40 mg total) by mouth once daily.    Take 1 tablet (40 mg total) by mouth once daily.    TAMSULOSIN (FLOMAX) 0.4 MG CAP tamsulosin (FLOMAX) 0.4 mg Cap       Take 1 capsule (0.4 mg total) by mouth once daily.    Take 0.4 mg by mouth once daily.

## 2025-04-29 ENCOUNTER — HOSPITAL ENCOUNTER (OUTPATIENT)
Dept: RADIOLOGY | Facility: HOSPITAL | Age: 65
Discharge: HOME | End: 2025-04-29
Attending: ORTHOPAEDIC SURGERY
Payer: MEDICARE

## 2025-04-29 ENCOUNTER — OFFICE VISIT (OUTPATIENT)
Dept: ORTHOPEDICS | Facility: CLINIC | Age: 65
End: 2025-04-29
Payer: MEDICARE

## 2025-04-29 DIAGNOSIS — S92.352A CLOSED DISPLACED FRACTURE OF FIFTH METATARSAL BONE OF LEFT FOOT, INITIAL ENCOUNTER: ICD-10-CM

## 2025-04-29 DIAGNOSIS — S92.352A CLOSED DISPLACED FRACTURE OF FIFTH METATARSAL BONE OF LEFT FOOT, INITIAL ENCOUNTER: Primary | ICD-10-CM

## 2025-04-29 PROCEDURE — 73630 X-RAY EXAM OF FOOT: CPT | Mod: 26,LT,, | Performed by: RADIOLOGY

## 2025-04-29 PROCEDURE — 99213 OFFICE O/P EST LOW 20 MIN: CPT | Mod: S$PBB,,, | Performed by: ORTHOPAEDIC SURGERY

## 2025-04-29 PROCEDURE — 73630 X-RAY EXAM OF FOOT: CPT | Mod: TC,PO,LT

## 2025-04-29 NOTE — PROGRESS NOTES
Status/Diagnosis: Displaced Left 5th MT diaphyseal fracture.  Date of Surgery:   12/04/2024: Left 5th MT nonunion takedown & ORIF.  Date of Injury: 04/10/2024  Return visit: PRN  X-rays on Return: pending patient complaint      Present History:  Patient presents today via referral from No ref. provider found   Osbaldo Brewer is a 64 y.o. male with acute onset left lateral forefoot pain.  Patient actually endorses a syncopal episode that he relates to be dehydrated on 04/10/2024.  Immediate pain and swelling about the left foot.  X-rays taken at the emergency department were consistent with fracture.  He was placed into a splint with instructions for outpatient orthopedic follow-up.  Denies any prior syncopal episodes.  No foot pain or instability prior to the above.    Pain currently 0/10.  Denies any numbness or tingling.    Denies tobacco use.  Works as a .    POSTOP:  Patient doing well postop now almost 5 months out from surgery.  3/10 pain.  Currently full weight-bearing in normal shoe wear.  Has been wearing Hokas for the last 2 weeks.  Primary complaint today is actually persistent left shoulder pain after new onset injury 2-3 weeks ago.      Past Medical History:   Diagnosis Date    Basal cell carcinoma     BPH associated with nocturia 01/11/2023    Erectile dysfunction 01/11/2023    History of Helicobacter pylori infection 01/11/2023    History of sinus surgery 01/11/2023    Mixed hyperlipidemia 08/29/2014       Past Surgical History:   Procedure Laterality Date    FUNCTIONAL ENDOSCOPIC SINUS SURGERY (FESS) USING COMPUTER-ASSISTED NAVIGATION Bilateral 3/31/2023    Procedure: SINUS SURGERY FUNCTIONAL ENDOSCOPIC WITH NAVIGATION;  Surgeon: Neville Navarrete MD;  Location: [facility] OR;  Service: ENT;  Laterality: Bilateral;    NASAL SEPTOPLASTY Bilateral 3/31/2023    Procedure: SEPTOPLASTY;  Surgeon: Neville Navarrete MD;  Location: [facility] OR;  Service: ENT;  Laterality: Bilateral;    OPEN  REDUCTION AND INTERNAL FIXATION (ORIF) OF FRACTURE OF METATARSAL BONE Left 12/4/2024    Procedure: ORIF, FRACTURE, METATARSAL BONE;  Surgeon: Miol Ramirez MD;  Location[facility];  Service: Orthopedics;  Laterality: Left;  5TH METATARSAL NONUNION TAKEDOWN    SINUS SURGERY      WISDOM TOOTH EXTRACTION      states woke up during procedure       Current Outpatient Medications   Medication Sig    aspirin (ECOTRIN) 81 MG EC tablet Take 1 tablet (81 mg total) by mouth once daily.    atorvastatin (LIPITOR) 40 MG tablet Take 1 tablet (40 mg total) by mouth once daily.    azelastine (ASTELIN) 137 mcg (0.1 %) nasal spray 2 sprays (274 mcg total) by Nasal route 2 (two) times daily.    COQ10, UBIQUINOL, ORAL Take by mouth.    famotidine (PEPCID) 40 MG tablet Take 1 tablet (40 mg total) by mouth 2 (two) times daily.    fluorouraciL (EFUDEX) 5 % cream AAA face bid x 2-4 weeks    ipratropium (ATROVENT) 42 mcg (0.06 %) nasal spray 2 sprays by Each Nostril route 3 (three) times daily as needed (runny nose (rhinitis)).    ketoconazole (NIZORAL) 2 % cream Apply twice a day to affected areas of skin    traZODone (DESYREL) 150 MG tablet 1/3-1 tab PO qhs PRN insomnia     No current facility-administered medications for this visit.       Review of patient's allergies indicates:   Allergen Reactions    Oxycodone-acetaminophen Rash       No family history on file.    Social History     Socioeconomic History    Marital status:    Tobacco Use    Smoking status: Never    Smokeless tobacco: Never   Substance and Sexual Activity    Alcohol use: Yes     Comment: occ    Drug use: Never     Social Drivers of Health     Physical Activity: Unknown (6/21/2024)    Exercise Vital Sign     Days of Exercise per Week: 0 days     Minutes of Exercise per Session: Patient declined   Stress: Stress Concern Present (6/21/2024)    [employer] - Occupational Stress Questionnaire     Feeling of Stress : To some extent        Physical exam:  There were no vitals filed for this visit.  There is no height or weight on file to calculate BMI.  General: In no apparent distress; well developed and well nourished.  HEENT: normocephalic; atraumatic.  Cardiovascular: regular rate.  Respiratory: no increased work of breathing.  Musculoskeletal:   Gait: did not assess  Inspection:   Surgical site with well healed scar.  No residual eschar.  Mild residual swelling but improved.  No warmth or erythema.  No signs or symptoms of infection.  Ankle range of motion from 5° dorsiflexion to 45° plantar flexion.  Minimal tenderness of the fracture site.  Gross motor and function intact.  Some altered sensation about the incision to be expected.  Brisk cap refill less than 2 seconds all 5 digits.       Imaging Studies/Outside documentation:  I have ordered/reviewed/interpreted the following images/outside documentation:  1. WB 3-views of Left foot:  Patient is status post left 5th metatarsal shaft nonunion takedown and ORIF.  Overall alignment well-maintained.  Fracture is well healed. No evidence of implant loosening or failure.        Assessment:  Osbaldo Brewer is a 64 y.o. male with Displaced Left 5th MT diaphyseal fracture.  Left ankle deltoid injury; peroneal tendon strain.     Plan:   Clinical and radiographic findings were discussed.   Patient is currently full weight-bearing in normal shoe wear.  Weightbear as tolerated left lower extremity.    Still no high impact activities-running, jumping, etc..    Patient voiced understanding.  All questions were answered.  He will follow up on an as-needed basis.      This note was created using voice recognition software and may contain grammatical errors.

## 2025-04-30 DIAGNOSIS — M25.512 LEFT SHOULDER PAIN, UNSPECIFIED CHRONICITY: Primary | ICD-10-CM

## 2025-05-01 ENCOUNTER — RESULTS FOLLOW-UP (OUTPATIENT)
Dept: FAMILY MEDICINE | Facility: CLINIC | Age: 65
End: 2025-05-01

## 2025-05-01 ENCOUNTER — OFFICE VISIT (OUTPATIENT)
Dept: ORTHOPEDICS | Facility: CLINIC | Age: 65
End: 2025-05-01
Payer: MEDICARE

## 2025-05-01 ENCOUNTER — HOSPITAL ENCOUNTER (OUTPATIENT)
Dept: RADIOLOGY | Facility: HOSPITAL | Age: 65
Discharge: HOME | End: 2025-05-01
Attending: ORTHOPAEDIC SURGERY
Payer: MEDICARE

## 2025-05-01 VITALS — HEIGHT: 75 IN | BODY MASS INDEX: 28.23 KG/M2 | WEIGHT: 227 LBS

## 2025-05-01 DIAGNOSIS — M25.512 LEFT SHOULDER PAIN, UNSPECIFIED CHRONICITY: Primary | ICD-10-CM

## 2025-05-01 DIAGNOSIS — E78.2 MIXED HYPERLIPIDEMIA: ICD-10-CM

## 2025-05-01 DIAGNOSIS — M25.512 LEFT SHOULDER PAIN, UNSPECIFIED CHRONICITY: ICD-10-CM

## 2025-05-01 PROCEDURE — 99213 OFFICE O/P EST LOW 20 MIN: CPT | Mod: PBBFAC,25,PN | Performed by: ORTHOPAEDIC SURGERY

## 2025-05-01 PROCEDURE — 99999 PR PBB SHADOW E&M-EST. PATIENT-LVL III: CPT | Mod: PBBFAC,,, | Performed by: ORTHOPAEDIC SURGERY

## 2025-05-01 PROCEDURE — 73030 X-RAY EXAM OF SHOULDER: CPT | Mod: TC,PN,LT

## 2025-05-01 PROCEDURE — 73030 X-RAY EXAM OF SHOULDER: CPT | Mod: 26,LT,, | Performed by: RADIOLOGY

## 2025-05-01 RX ORDER — MELOXICAM 15 MG/1
15 TABLET ORAL DAILY
Qty: 30 TABLET | Refills: 1 | Status: SHIPPED | OUTPATIENT
Start: 2025-05-01

## 2025-05-01 RX ORDER — ATORVASTATIN CALCIUM 40 MG/1
40 TABLET, FILM COATED ORAL
Qty: 90 TABLET | Refills: 3 | OUTPATIENT
Start: 2025-05-01

## 2025-05-01 NOTE — TELEPHONE ENCOUNTER
Refill Decision Note   Osbaldo Brewer  is requesting a refill authorization.  Brief Assessment and Rationale for Refill:  Quick Discontinue     Medication Therapy Plan:  90+3 Receipt confirmed by pharmacy (4/23/2025  3:27 PM CDT)      Comments:     Note composed:4:37 AM 05/01/2025             Appointments     Last Visit   4/23/2025 Eddi Awan MD   Next Visit   Visit date not found Eddi Awan MD

## 2025-05-01 NOTE — PROGRESS NOTES
Subjective:      Patient ID: Osbaldo Brewer is a 64 y.o. male.    Chief Complaint: Pain of the Left Shoulder (Started hurting about 3wks ago. Felt a strain while pulling up onto the boat.)    HPI  64-year-old male 3 to four-week history of left shoulder pain.  He states that he is pulling himself by his shoulder onto a boot felt acute burning pain in his shoulder.  He is right-hand dominant .  He was using his shoulders a bit more secondary to recovery from a somewhat remote ORIF left 5th metatarsal fracture.  He has been frustrated at a year recovering from a broken foot.  Complaining of generalized pain stiffness in his shoulder since the event.  Denies any other complaints or prior problems with it.  ROS      Objective:    Ortho Exam     Constitutional:   Patient is alert  and oriented in no acute distress  HEENT:  normocephalic atraumatic; PERRL EOMI  Neck:  Supple without adenopathy  Cardiovascular:  Normal rate and rhythm  Pulmonary:  Normal respiratory effort normal chest wall expansion  Abdominal:  Nonprotuberant nondistended  Musculoskeletal:  Patient has a steady nonantalgic gait.  Neck is supple with adequate range of motion  No Lhermitte's or Spurling sign.  Mild limitation of range of motion of the left shoulder with discomfort with full forward flexion and internal rotation.  There is a mildly positive impingement sign.  Negative Yergason's and Speed's testing.  No gross weakness however pain with strength testing against resistance there is no swelling mass or atrophy noted.  There is a normal distal neurologic and vascular examination.  Neurological:  No focal defect; cranial nerves 2-12 grossly intact  Psychiatric/behavioral:  Mood and behavior normal    X-Ray Shoulder Trauma 3 view Left  EXAMINATION:  XR SHOULDER TRAUMA 3 VIEW LEFT    CLINICAL HISTORY:  Pain in left shoulder    TECHNIQUE:  Three views of the left shoulder were performed.    COMPARISON  None    FINDINGS:  Mild  glenohumeral degenerative osteoarthrosis with mild joint space narrowing and small osteophyte formation.  AC joint intact.    Electronically signed by: Alfredito Braden  Date:    05/01/2025  Time:    10:09       My Radiographs Findings:    Radiographs show some degenerative change of the glenohumeral joint without acute osseous abnormalities.  Assessment:       No diagnosis found.      Plan:       I have discussed medical condition treatment options with the him at length.  He prefers a conservative approach to some rotator cuff strain possible small cuff or labral tear.  We will give him a trial of NSAIDs GI cardiac and renal precautions were discussed and a prescription for physical therapy.  We have discussed general activity restrictions lifting precautions follow up in 4-6 weeks if symptoms fail to improve we would suggest injection or MRI at that time.  Otherwise follow up can be as needed.        Past Medical History:   Diagnosis Date    Basal cell carcinoma     BPH associated with nocturia 01/11/2023    Erectile dysfunction 01/11/2023    History of Helicobacter pylori infection 01/11/2023    History of sinus surgery 01/11/2023    Mixed hyperlipidemia 08/29/2014     Past Surgical History:   Procedure Laterality Date    FUNCTIONAL ENDOSCOPIC SINUS SURGERY (FESS) USING COMPUTER-ASSISTED NAVIGATION Bilateral 3/31/2023    Procedure: SINUS SURGERY FUNCTIONAL ENDOSCOPIC WITH NAVIGATION;  Surgeon: Neville Navarrete MD;  Location: [facility] OR;  Service: ENT;  Laterality: Bilateral;    NASAL SEPTOPLASTY Bilateral 3/31/2023    Procedure: SEPTOPLASTY;  Surgeon: Neville Navarrete MD;  Location: [facility] OR;  Service: ENT;  Laterality: Bilateral;    OPEN REDUCTION AND INTERNAL FIXATION (ORIF) OF FRACTURE OF METATARSAL BONE Left 12/4/2024    Procedure: ORIF, FRACTURE, METATARSAL BONE;  Surgeon: Milo Ramirez MD;  Location: [facility] OR;  Service: Orthopedics;  Laterality: Left;  5TH METATARSAL NONUNION TAKEDOWN    SINUS SURGERY       WISDOM TOOTH EXTRACTION      states woke up during procedure       Current Medications[1]    Review of patient's allergies indicates:   Allergen Reactions    Oxycodone-acetaminophen Rash       No family history on file.  Social History     Occupational History    Not on file   Tobacco Use    Smoking status: Never     Passive exposure: Never    Smokeless tobacco: Never   Substance and Sexual Activity    Alcohol use: Yes     Comment: occ    Drug use: Never    Sexual activity: Yes            [1]   Current Outpatient Medications:     atorvastatin (LIPITOR) 40 MG tablet, Take 1 tablet (40 mg total) by mouth once daily., Disp: 90 tablet, Rfl: 3    azelastine (ASTELIN) 137 mcg (0.1 %) nasal spray, 2 sprays (274 mcg total) by Nasal route 2 (two) times daily., Disp: 30 mL, Rfl: 6    COQ10, UBIQUINOL, ORAL, Take by mouth., Disp: , Rfl:     famotidine (PEPCID) 40 MG tablet, Take 1 tablet (40 mg total) by mouth 2 (two) times daily., Disp: 30 tablet, Rfl: 11    fluorouraciL (EFUDEX) 5 % cream, AAA face bid x 2-4 weeks, Disp: 40 g, Rfl: 1    ipratropium (ATROVENT) 42 mcg (0.06 %) nasal spray, 2 sprays by Each Nostril route 3 (three) times daily as needed (runny nose (rhinitis))., Disp: 15 mL, Rfl: 11    ketoconazole (NIZORAL) 2 % cream, Apply twice a day to affected areas of skin, Disp: 60 g, Rfl: 2    tamsulosin (FLOMAX) 0.4 mg Cap, Take 1 capsule (0.4 mg total) by mouth once daily., Disp: 90 capsule, Rfl: 3

## 2025-05-01 NOTE — TELEPHONE ENCOUNTER
No care due was identified.  [facility] Embedded Care Due Messages. Reference number: [id number].   5/01/2025 3:28:02 AM CDT   Retention Suture Text: Retention sutures were placed to support the closure and prevent dehiscence.

## 2025-05-08 ENCOUNTER — CLINICAL SUPPORT (OUTPATIENT)
Dept: REHABILITATION | Facility: HOSPITAL | Age: 65
End: 2025-05-08
Payer: MEDICARE

## 2025-05-08 DIAGNOSIS — R52 PAIN AGGRAVATED BY ACTIVITIES OF DAILY LIVING: ICD-10-CM

## 2025-05-08 DIAGNOSIS — M25.512 ACUTE PAIN OF LEFT SHOULDER: ICD-10-CM

## 2025-05-08 DIAGNOSIS — M25.512 LEFT SHOULDER PAIN, UNSPECIFIED CHRONICITY: ICD-10-CM

## 2025-05-08 DIAGNOSIS — M25.612 DECREASED RANGE OF MOTION OF LEFT SHOULDER: Primary | ICD-10-CM

## 2025-05-08 PROCEDURE — 97166 OT EVAL MOD COMPLEX 45 MIN: CPT

## 2025-05-08 PROCEDURE — 97110 THERAPEUTIC EXERCISES: CPT

## 2025-05-08 NOTE — PATIENT INSTRUCTIONS
HOME EXERCISE PROGRAM  Created by JANIE Ramierz OTR/L  May 8th, 2025  View videos at [email or web address]        SHOULDER FLEXION AAROM - SUPINE - CANE    Lying on your back and holding a wand or cane, slowly raise the wand towards overhead. Use your unaffected arm to assist with the movement. Repeat 10 Times   Complete 2 Sets   Perform 2 Times a Day          Wall slide    Slide towel up wall until stretch is felt, hold 3 sec. Lower arm, and repeat. Repeat 10 Times   Hold 3 Seconds   Complete 2 Sets   Perform 2 Times a Day          SCAPULAR RETRACTIONS    Move your shoulder blades back and down. Hold, relax and repeat. Repeat 20 Times   Hold 3 Seconds   Complete 2 Sets   Perform 2 Times a Day          Shoulder shrug    Standing or sitting, begin with your arms by your side. Lift your shoulders up like you are shrugging. Return to the starting position; hold and repeat as directed. Repeat 10 Times   Complete 2 Sets   Perform 2 Times a Day          UPPER TRAP STRETCH        - Stand upright with good posture, one arm behind the back  - Use your free hand to grab the top of your head  - Gently pull your head to the side (ear to shoulder)  - Hold the stretch and try to relax your muscles with deep breathing     Hold 10 Seconds

## 2025-05-09 NOTE — PROGRESS NOTES
Outpatient Rehab    Occupational Therapy Evaluation    Patient Name: Osbaldo Brewer  MRN: [id number]  YOB: 1960  Encounter Date: 5/8/2025    Therapy Diagnosis:   Encounter Diagnoses   Name Primary?    Left shoulder pain, unspecified chronicity     Decreased range of motion of left shoulder Yes    Pain aggravated by activities of daily living     Acute pain of left shoulder      Physician: Milo Ramirez MD    Physician Orders: Eval and Treat  Medical Diagnosis: Left shoulder pain, unspecified chronicity    Visit # / Visits Authorized: 1 / 1  Insurance Authorization Period: 5/1/2025 to 5/1/2026  Date of Evaluation: 5/8/2025  Plan of Care Certification: 5/8/2025 to 7/4/25     Time In: 0932   Time Out: 1018  Total Time (in minutes): 46   Total Billable Time (in minutes): 46    Intake Outcome Measure for FOTO Survey    Therapist reviewed FOTO scores for Osbaldo Brewer on 5/8/2025.   FOTO report - see Media section or FOTO account episode details.     Intake Score: 36%         Subjective   History of Present Illness  Osbaldo is a 64 y.o. male who reports to occupational therapy with a chief concern of Left shoulder pain.     The patient reports a medical diagnosis of M25.512 (ICD-10-CM) - Left shoulder pain, unspecified chronicity.    Diagnostic tests related to this condition: X-ray.   X-Ray Details: 5/1/25:: FINDINGS:  Mild glenohumeral degenerative osteoarthrosis with mild joint space narrowing and small osteophyte formation.  AC joint intact.    Dominant Hand: Right  History of Present Condition/Illness: Patient reports he had surgery on his foot in December of 2024. He has had difficulties from this surgery with weightbearing. About a month ago, he states he went to climb on his boat. When doing so, he was using the hand railing to pull himself up in order to decrease the weight on his left foot. He states when he was reaching up/pulling himself up, he felt a sudden, burning  sensation. He reports it was very painful. He reports he sort of brushed it off, but it seemed as if it was getting worse rather than getting better. He was noticing significant pain with certain, sudden motions. He also reports pain with weight bearing. He had an appointment with Dr. Sanchez on 5/1/25 who performed x-rays with no acute abnormalities besides degenerative OA who referred him to therapy.    Pain     Patient reports a current pain level of 4/10. Pain at best is reported as 2/10. Pain at worst is reported as 9/10.   Location: Near lateral humerus at RTC insertion  Clinical Progression (since onset): Worsening  Pain Qualities: Dull, Other (Comment), Burning, Sharp, Aching  Other Pain Qualities: pulsing  Pain-Relieving Factors: Other (Comment)  Other Pain-Relieving Factors: Nothing  Pain-Aggravating Factors: Twisting, Other (Comment), Lifting, Reaching, Rotation  Other Pain-Aggravating Factors: weight bearing  Patient describing motions consistent with internal rotation causing the most pain.         Past Medical History/Physical Systems Review:   Osbaldo Brewer  has a past medical history of Basal cell carcinoma, BPH associated with nocturia, Erectile dysfunction, History of Helicobacter pylori infection, History of sinus surgery, and Mixed hyperlipidemia.    Osbaldo Brewer  has a past surgical history that includes Sinus surgery; [place] tooth extraction; Functional endoscopic sinus surgery (FESS) using computer-assisted navigation (Bilateral, 3/31/2023); Nasal septoplasty (Bilateral, 3/31/2023); and Open reduction and internal fixation (ORIF) of fracture of metatarsal bone (Left, 12/4/2024).    Osbaldo has a current medication list which includes the following prescription(s): atorvastatin, azelastine, ubiquinol, famotidine, fluorouracil, ipratropium, ketoconazole, meloxicam, and tamsulosin.    Review of patient's allergies indicates:   Allergen Reactions    Oxycodone-acetaminophen Rash         Objective      Shoulder Range of Motion  Right Shoulder   Active (deg) Passive (deg) Pain   Flexion 151       Extension 77       Scaption         ABduction 165       ADduction         Horizontal ABduction         Horizontal ADduction         External Rotation (Shoulder ABducted 0 degrees)         External Rotation (Shoulder ABducted 45 degrees)         External Rotation (Shoulder ABducted 90 degrees) 58       Internal Rotation (Shoulder ABducted 0 degrees)  (T10)       Internal Rotation (Shoulder ABducted 45 degrees)         Internal Rotation (Shoulder ABducted 90 degrees)           Left Shoulder   Active (deg) Passive (deg) Pain   Flexion 74 85     Extension 40       Scaption         ABduction 58 70     ADduction         Horizontal ABduction         Horizontal ADduction         External Rotation (Shoulder ABducted 0 degrees) 39 50     External Rotation (Shoulder ABducted 45 degrees)         External Rotation (Shoulder ABducted 90 degrees)         Internal Rotation (Shoulder ABducted 0 degrees)  (back pocket)       Internal Rotation (Shoulder ABducted 45 degrees)         Internal Rotation (Shoulder ABducted 90 degrees)                       Shoulder Strength - Planes of Motion   Right Strength Right Pain Left Strength Left  Pain   Flexion 5   5 (at 50 degrees)     Extension 5   5     ABduction 5   4 Yes   ADduction           Horizontal ABduction           Horizontal ADduction           Internal Rotation 0°     4     Internal Rotation 90° 5         External Rotation 0°     5     External Rotation 90° 5                       Shoulder Special Tests  Rotator Cuff Tests  Negative: Left Bear Hug, Left Drop Arm, and Left Empty Can  Positive: Left Lift Off  Impingement Tests  Positive: Left Ko-Db              Treatment:  Therapeutic Exercise  TE 1: review and establishment of HEP    Time Entry(in minutes):  OT Evaluation (Moderate) Time Entry: 36  Therapeutic Exercise Time Entry: 10    Assessment & Plan    Assessment  Osbaldo presents with a condition of Moderate complexity.   Presentation of Symptoms: Changing  Will Comorbidities Impact Care: No       ADL Limitations : Bathing/showering, Dressing  IADL Limitations: Home establishment and management, Meal preparation and cleanup  Rest and Sleep Limitations: Disrupted sleep pattern, Sleep participation  Leisure Limitations: Leisure participation  Functional Limitations: Activity tolerance, Carrying objects, Gross motor coordination, Functional mobility, Pain when reaching, Pain with ADLs/IADLs, Range of motion, Proprioception, Reaching                 Anticipated Need for Modification: na  Evaluation/Treatment Response: Patient responded to treatment well  Patient Goal for Therapy (OT): decrease pain and increase motion  Prognosis: Fair  Assessment Details: Patient demonstrates fairly moderate loss of range of motion after injury where he sustained sudden pain. MMT shows loss of strength in left arm as well. Provacative tests of Lift Off and Ko Db were positive. He reports difficulty with reaching across the body, like for putting on deodorant, and requiring assist for upper body dressing.     Plan  From an occupational therapy perspective, the patient would benefit from: Skilled Rehab Services    Planned therapy interventions include: Therapeutic exercise, Therapeutic activities, Manual therapy, Neuromuscular re-education, ADLs/IADLs, and Orthotic management and training.    Planned modalities to include: Fluidotherapy, Paraffin bath, Ultrasound, and Thermotherapy (hot pack).        Visit Frequency: 2 times Per Week for 8 Weeks.       This plan was discussed with Patient.   Discussion participants: Agreed Upon Plan of Care             Patient's spiritual, cultural, and educational needs considered and patient agreeable to plan of care and goals.           Goals:   Active       LTG       IND with HEP       Start:  05/09/25    Expected End:  06/20/25             Pt will demonstrate (left) shoulder AROM WNL grossly for [place] with UB drsg and overhead functional reach by discharge.          Start:  05/09/25    Expected End:  06/20/25            Pt will report 1/10 pain in (L)shoulder at worst with functional mobility and overhead use by dc.          Start:  05/09/25    Expected End:  06/20/25            Pt will demonstrate (left) shoulder MMT WNL grossly for [place] with functional activities         Start:  05/09/25    Expected End:  06/20/25            Patient will be able to achieve less than or equal to 25% on the FOTO, demonstrating overall improved functional ability with upper extremity.  (Self-care category)        Start:  05/09/25    Expected End:  06/20/25               STG       Initiate HEP       Start:  05/09/25    Expected End:  06/06/25             Pt will increase shoulder AROM by 10 degrees grossly for improved performance with overhead ADL's         Start:  05/09/25    Expected End:  06/06/25            Pt will report 5/10 pain in (left)shoulder at worst with functional mobility and overhead use by 4 weeks.          Start:  05/09/25    Expected End:  06/06/25            Pt will demonstrate increased MMT to 4-/5 grossly left shoulder for functional reach and carrying, moving, handling objects by 4 weeks.          Start:  05/09/25    Expected End:  06/06/25            Patient will be able to achieve less than or equal to 50% on the FOTO, demonstrating overall improved functional ability with upper extremity.  (Self-care category)        Start:  05/09/25    Expected End:  06/06/25                Apurva Mejia, OT

## 2025-05-12 ENCOUNTER — CLINICAL SUPPORT (OUTPATIENT)
Dept: REHABILITATION | Facility: HOSPITAL | Age: 65
End: 2025-05-12
Payer: MEDICARE

## 2025-05-12 DIAGNOSIS — M25.612 DECREASED RANGE OF MOTION OF LEFT SHOULDER: Primary | ICD-10-CM

## 2025-05-12 DIAGNOSIS — M25.512 ACUTE PAIN OF LEFT SHOULDER: ICD-10-CM

## 2025-05-12 DIAGNOSIS — R52 PAIN AGGRAVATED BY ACTIVITIES OF DAILY LIVING: ICD-10-CM

## 2025-05-12 PROCEDURE — 97140 MANUAL THERAPY 1/> REGIONS: CPT

## 2025-05-12 PROCEDURE — 97110 THERAPEUTIC EXERCISES: CPT

## 2025-05-12 NOTE — PROGRESS NOTES
"  Outpatient Rehab    Occupational Therapy Visit    Patient Name: Osbaldo Brewer  MRN: [id number]  YOB: 1960  Encounter Date: 5/12/2025    Therapy Diagnosis:   Encounter Diagnoses   Name Primary?    Decreased range of motion of left shoulder Yes    Pain aggravated by activities of daily living     Acute pain of left shoulder      Physician: Vick Sanchez,*    Physician Orders: Eval and Treat  Medical Diagnosis: Left shoulder pain, unspecified chronicity    Visit # / Visits Authorized: 1 / 15  Insurance Authorization Period: 5/8/2025 to 12/31/2025  Date of Evaluation: 5/8/2025  Plan of Care Certification: 5/9/2025 to 7/4/2025      Time In: 1015   Time Out: 1101  Total Time (in minutes): 46   Total Billable Time (in minutes): 46    FOTO:  Intake Score:  %  Survey Score 2:  %  Survey Score 3:  %    Precautions:       Subjective   "there'a a little pain".         Objective            Treatment:  Therapeutic Exercise  TE 1: AAROM 1# dowel (chest press x12, flexion 2x10, horiz abd/add x5)  TE 2: scap retracts (red band) x20  TE 3: sidelying er x10 0 weight  TE 4: cable shoulder extension 3# x10  Manual Therapy  MT 1: PROM to tolerance  MT 2: anterior to posterior glenohumeral joint mobilizations  MT 3: glenohumeral joint distractions x3 x30 sec hold each  Modalities  Moist Heat (min): 8    Time Entry(in minutes):  Hot/Cold Pack Time Entry: 8  Manual Therapy Time Entry: 12  Therapeutic Exercise Time Entry: 26    Assessment & Plan   Assessment: Pt would continue to benefit from skilled OT services. Overall, he tolerated treatment well. Exercises performed today with low resistance but tolerated fair reaching ~90 degrees with shoulder flexion. Will continue to progress within pt tolerance.  Evaluation/Treatment Tolerance: Patient tolerated treatment well    Patient will continue to benefit from skilled outpatient occupational therapy to address the deficits listed in the problem list box on " initial evaluation, provide pt/family education and to maximize pt's level of independence in the home and community environment.     Patient's spiritual, cultural, and educational needs considered and patient agreeable to plan of care and goals.           Plan: continue per initial plan of care    Goals:   Active       LTG       IND with HEP (Progressing)       Start:  05/09/25    Expected End:  06/20/25            Pt will demonstrate (left) shoulder AROM WNL grossly for [place] with UB drsg and overhead functional reach by discharge.    (Progressing)       Start:  05/09/25    Expected End:  06/20/25            Pt will report 1/10 pain in (L)shoulder at worst with functional mobility and overhead use by dc.    (Progressing)       Start:  05/09/25    Expected End:  06/20/25            Pt will demonstrate (left) shoulder MMT WNL grossly for [place] with functional activities   (Progressing)       Start:  05/09/25    Expected End:  06/20/25            Patient will be able to achieve less than or equal to 25% on the FOTO, demonstrating overall improved functional ability with upper extremity.  (Self-care category)  (Progressing)       Start:  05/09/25    Expected End:  06/20/25               STG       Initiate HEP (Met)       Start:  05/09/25    Expected End:  06/06/25    Resolved:  05/12/25          Pt will increase shoulder AROM by 10 degrees grossly for improved performance with overhead ADL's   (Progressing)       Start:  05/09/25    Expected End:  06/06/25            Pt will report 5/10 pain in (left)shoulder at worst with functional mobility and overhead use by 4 weeks.    (Progressing)       Start:  05/09/25    Expected End:  06/06/25            Pt will demonstrate increased MMT to 4-/5 grossly left shoulder for functional reach and carrying, moving, handling objects by 4 weeks.    (Progressing)       Start:  05/09/25    Expected End:  06/06/25            Patient will be able to achieve less than or  equal to 50% on the FOTO, demonstrating overall improved functional ability with upper extremity.  (Self-care category)  (Progressing)       Start:  05/09/25    Expected End:  06/06/25                Apurva Mejia OT

## 2025-05-14 ENCOUNTER — CLINICAL SUPPORT (OUTPATIENT)
Dept: REHABILITATION | Facility: HOSPITAL | Age: 65
End: 2025-05-14
Payer: MEDICARE

## 2025-05-14 DIAGNOSIS — R52 PAIN AGGRAVATED BY ACTIVITIES OF DAILY LIVING: ICD-10-CM

## 2025-05-14 DIAGNOSIS — M25.612 DECREASED RANGE OF MOTION OF LEFT SHOULDER: Primary | ICD-10-CM

## 2025-05-14 DIAGNOSIS — M25.512 ACUTE PAIN OF LEFT SHOULDER: ICD-10-CM

## 2025-05-14 PROCEDURE — 97140 MANUAL THERAPY 1/> REGIONS: CPT

## 2025-05-14 PROCEDURE — 97110 THERAPEUTIC EXERCISES: CPT

## 2025-05-14 NOTE — PROGRESS NOTES
Outpatient Rehab    Occupational Therapy Visit    Patient Name: Osbaldo Brewer  MRN: [id number]  YOB: 1960  Encounter Date: 5/14/2025    Therapy Diagnosis:   Encounter Diagnoses   Name Primary?    Decreased range of motion of left shoulder Yes    Pain aggravated by activities of daily living     Acute pain of left shoulder      Physician: Vick Sanchez,*    Physician Orders: Eval and Treat  Medical Diagnosis: Left shoulder pain, unspecified chronicity    Visit # / Visits Authorized: 2 / 15  Insurance Authorization Period: 5/8/2025 to 12/31/2025  Date of Evaluation: 5/8/2025  Plan of Care Certification: 5/9/2025 to 7/4/2025      Time In: 1012   Time Out: 1101  Total Time (in minutes): 49   Total Billable Time (in minutes): 49    FOTO:  Intake Score:  %  Survey Score 2:  %  Survey Score 3:  %    Precautions:       Subjective   its about the same.         Objective            Treatment:  Therapeutic Exercise  TE 1: AAROM 1# dowel (chest press x12, flexion 2x10)  TE 2: scap retracts (red band) x20  TE 3: sidelying er x10 0 weight  TE 4: cable shoulder extension 3# x10  TE 5: pulleys FF x2 minutes  Manual Therapy  MT 1: PROM to tolerance  MT 2: anterior to posterior glenohumeral joint mobilizations  MT 3: glenohumeral joint distractions x3 x30 sec hold each  Modalities  Moist Heat (min): 8    Time Entry(in minutes):  Hot/Cold Pack Time Entry: 8  Manual Therapy Time Entry: 16  Therapeutic Exercise Time Entry: 25    Assessment & Plan   Assessment: Pt would continue to benefit from skilled OT services. Increased motion noted with AAROM dowel flexion. Still with pain reports near rotator cuff insertion. Seemingy pushign through pain a little to perform exercises, but overall, he has good tolerance. Will continue to upgrade within pt tolerance.  Evaluation/Treatment Tolerance: Patient tolerated treatment well    Patient will continue to benefit from skilled outpatient occupational therapy to  address the deficits listed in the problem list box on initial evaluation, provide pt/family education and to maximize pt's level of independence in the home and community environment.     Patient's spiritual, cultural, and educational needs considered and patient agreeable to plan of care and goals.           Plan: continue per initial plan of care    Goals:   Active       LTG       IND with HEP (Progressing)       Start:  05/09/25    Expected End:  06/20/25            Pt will demonstrate (left) shoulder AROM WNL grossly for [place] with UB drsg and overhead functional reach by discharge.    (Progressing)       Start:  05/09/25    Expected End:  06/20/25            Pt will report 1/10 pain in (L)shoulder at worst with functional mobility and overhead use by dc.    (Progressing)       Start:  05/09/25    Expected End:  06/20/25            Pt will demonstrate (left) shoulder MMT WNL grossly for [place] with functional activities   (Progressing)       Start:  05/09/25    Expected End:  06/20/25            Patient will be able to achieve less than or equal to 25% on the FOTO, demonstrating overall improved functional ability with upper extremity.  (Self-care category)  (Progressing)       Start:  05/09/25    Expected End:  06/20/25               STG       Initiate HEP (Met)       Start:  05/09/25    Expected End:  06/06/25    Resolved:  05/12/25          Pt will increase shoulder AROM by 10 degrees grossly for improved performance with overhead ADL's   (Progressing)       Start:  05/09/25    Expected End:  06/06/25            Pt will report 5/10 pain in (left)shoulder at worst with functional mobility and overhead use by 4 weeks.    (Progressing)       Start:  05/09/25    Expected End:  06/06/25            Pt will demonstrate increased MMT to 4-/5 grossly left shoulder for functional reach and carrying, moving, handling objects by 4 weeks.    (Progressing)       Start:  05/09/25    Expected End:  06/06/25             Patient will be able to achieve less than or equal to 50% on the FOTO, demonstrating overall improved functional ability with upper extremity.  (Self-care category)  (Progressing)       Start:  05/09/25    Expected End:  06/06/25                Apurva Mejia OT

## 2025-05-19 ENCOUNTER — CLINICAL SUPPORT (OUTPATIENT)
Dept: REHABILITATION | Facility: HOSPITAL | Age: 65
End: 2025-05-19
Payer: MEDICARE

## 2025-05-19 DIAGNOSIS — R52 PAIN AGGRAVATED BY ACTIVITIES OF DAILY LIVING: ICD-10-CM

## 2025-05-19 DIAGNOSIS — M25.612 DECREASED RANGE OF MOTION OF LEFT SHOULDER: Primary | ICD-10-CM

## 2025-05-19 DIAGNOSIS — M25.512 ACUTE PAIN OF LEFT SHOULDER: ICD-10-CM

## 2025-05-19 PROCEDURE — 97140 MANUAL THERAPY 1/> REGIONS: CPT

## 2025-05-19 PROCEDURE — 97110 THERAPEUTIC EXERCISES: CPT

## 2025-05-19 NOTE — PROGRESS NOTES
Outpatient Rehab    Occupational Therapy Visit    Patient Name: Osbaldo Brewer  MRN: [id number]  YOB: 1960  Encounter Date: 5/19/2025    Therapy Diagnosis:   Encounter Diagnoses   Name Primary?    Decreased range of motion of left shoulder Yes    Pain aggravated by activities of daily living     Acute pain of left shoulder      Physician: Vick Sanchez,*    Physician Orders: Eval and Treat  Medical Diagnosis: Left shoulder pain, unspecified chronicity    Visit # / Visits Authorized: 3 / 15  Insurance Authorization Period: 5/8/2025 to 12/31/2025  Date of Evaluation: 5/8/2025  Plan of Care Certification: 5/9/2025 to 7/4/2025      Time In: 0231   Time Out: 0316  Total Time (in minutes): 45   Total Billable Time (in minutes): 45    FOTO:  Intake Score:  %  Survey Score 2:  %  Survey Score 3:  %    Precautions:       Subjective   well like i told my wife i think my pain is getting a little better. i do see some improvement.         Objective            Treatment:  Therapeutic Exercise  TE 1: AAROM 1# dowel (chest press x12, flexion 2x10)  TE 2: scap retracts (green band) x20  TE 3: sidelying er x10 0 weight  TE 4: cable shoulder extension 7# x10  TE 5: pulleys FF x2 minutes  TE 6: UBE x3 min forward, x3 min back level 1  TE 7: serratus punches 1# x12  Manual Therapy  MT 1: PROM to tolerance  MT 2: anterior to posterior glenohumeral joint mobilizations  MT 3: glenohumeral joint distractions x3 x30 sec hold each    Time Entry(in minutes):  Manual Therapy Time Entry: 12  Therapeutic Exercise Time Entry: 33    Assessment & Plan   Assessment: Pt would continue to benefit from skilled OT services. Performed UBE today with mild reports of discomfort throughout. Added serratus punches today and tolerated well. All exercises tolerated well with decreased pain reported. Will progress within tolerance.  Evaluation/Treatment Tolerance: Patient tolerated treatment well    Patient will continue to  benefit from skilled outpatient occupational therapy to address the deficits listed in the problem list box on initial evaluation, provide pt/family education and to maximize pt's level of independence in the home and community environment.     Patient's spiritual, cultural, and educational needs considered and patient agreeable to plan of care and goals.           Plan: continue per initial plan of care    Goals:   Active       LTG       IND with HEP (Progressing)       Start:  05/09/25    Expected End:  06/20/25            Pt will demonstrate (left) shoulder AROM WNL grossly for [place] with UB drsg and overhead functional reach by discharge.    (Progressing)       Start:  05/09/25    Expected End:  06/20/25            Pt will report 1/10 pain in (L)shoulder at worst with functional mobility and overhead use by dc.    (Progressing)       Start:  05/09/25    Expected End:  06/20/25            Pt will demonstrate (left) shoulder MMT WNL grossly for [place] with functional activities   (Progressing)       Start:  05/09/25    Expected End:  06/20/25            Patient will be able to achieve less than or equal to 25% on the FOTO, demonstrating overall improved functional ability with upper extremity.  (Self-care category)  (Progressing)       Start:  05/09/25    Expected End:  06/20/25               STG       Initiate HEP (Met)       Start:  05/09/25    Expected End:  06/06/25    Resolved:  05/12/25          Pt will increase shoulder AROM by 10 degrees grossly for improved performance with overhead ADL's   (Progressing)       Start:  05/09/25    Expected End:  06/06/25            Pt will report 5/10 pain in (left)shoulder at worst with functional mobility and overhead use by 4 weeks.    (Progressing)       Start:  05/09/25    Expected End:  06/06/25            Pt will demonstrate increased MMT to 4-/5 grossly left shoulder for functional reach and carrying, moving, handling objects by 4 weeks.     (Progressing)       Start:  05/09/25    Expected End:  06/06/25            Patient will be able to achieve less than or equal to 50% on the FOTO, demonstrating overall improved functional ability with upper extremity.  (Self-care category)  (Progressing)       Start:  05/09/25    Expected End:  06/06/25                Apurva Mejia OT

## 2025-05-21 ENCOUNTER — TELEPHONE (OUTPATIENT)
Facility: CLINIC | Age: 65
End: 2025-05-21
Payer: MEDICARE

## 2025-05-21 NOTE — TELEPHONE ENCOUNTER
----- Message from ITALIA Ledezma sent at 5/21/2025  2:58 PM CDT -----  Regarding: FW: Urgent    ----- Message -----  From: Lexi Crowley  Sent: 5/21/2025   1:45 PM CDT  To: Ja[facility]ON[facility] Staff  Subject: Urgent                                           Type: Patient callWho called: Patient Does the patient know what this is regarding? Requesting a call back in regards to needing to schedule an appt; states pre cancerous spots in various part of body ; haven't had a check up in over a year ; next available is not until September; will be in town on 6/4 if an appt is available for that day ; please advise Would the patient rather a call back or response via [facility]? [facility]: [phone] information:

## 2025-05-22 ENCOUNTER — PATIENT MESSAGE (OUTPATIENT)
Dept: OBSTETRICS AND GYNECOLOGY | Facility: CLINIC | Age: 65
End: 2025-05-22
Payer: MEDICARE

## 2025-05-29 ENCOUNTER — CLINICAL SUPPORT (OUTPATIENT)
Dept: REHABILITATION | Facility: HOSPITAL | Age: 65
End: 2025-05-29
Payer: MEDICARE

## 2025-05-29 DIAGNOSIS — M25.612 DECREASED RANGE OF MOTION OF LEFT SHOULDER: Primary | ICD-10-CM

## 2025-05-29 DIAGNOSIS — R52 PAIN AGGRAVATED BY ACTIVITIES OF DAILY LIVING: ICD-10-CM

## 2025-05-29 DIAGNOSIS — M25.512 ACUTE PAIN OF LEFT SHOULDER: ICD-10-CM

## 2025-05-29 PROCEDURE — 97110 THERAPEUTIC EXERCISES: CPT

## 2025-05-29 PROCEDURE — 97140 MANUAL THERAPY 1/> REGIONS: CPT

## 2025-05-29 NOTE — PROGRESS NOTES
Outpatient Rehab    Occupational Therapy Visit    Patient Name: Osbaldo Brewer  MRN: [id number]  YOB: 1960  Encounter Date: 5/29/2025    Therapy Diagnosis:   Encounter Diagnoses   Name Primary?    Decreased range of motion of left shoulder Yes    Pain aggravated by activities of daily living     Acute pain of left shoulder      Physician: Vick Sanchez,*    Physician Orders: Eval and Treat  Medical Diagnosis: Left shoulder pain, unspecified chronicity    Visit # / Visits Authorized: 4 / 15  Insurance Authorization Period: 5/8/2025 to 12/31/2025  Date of Evaluation: 5/8/2025  Plan of Care Certification: 5/9/2025 to 7/4/2025      Time In: 1022   Time Out: 1104  Total Time (in minutes): 42   Total Billable Time (in minutes): 42    FOTO:  Intake Score: 36%  Survey Score 2:  %  Survey Score 3:  %    Precautions:       Subjective   its about the same but i think the pain is maybe getting a little less i can sleep on it better than before.         Objective            Treatment:  Therapeutic Exercise  TE 1: AAROM 1# dowel (chest press x20, flexion x20)  TE 2: scap retracts (green band) x20  TE 3: sidelying er x20 1# db  TE 4: cable shoulder extension 7# x10  TE 5: pulleys FF x2 minutes  TE 6: UBE x3 min forward, x3 min back level 1 (held today)  TE 7: serratus punches 2# x20  Manual Therapy  MT 1: PROM to tolerance  MT 2: anterior to posterior glenohumeral joint mobilizations  MT 3: glenohumeral joint distractions x3 x30 sec hold each    Time Entry(in minutes):  Hot/Cold Pack Time Entry: 8  Manual Therapy Time Entry: 10  Therapeutic Exercise Time Entry: 24    Assessment & Plan   Assessment: Pt would continue to benefit from skilled OT services. He tolerated treatment well with minimal complaints of pain while performing exercises. Able to make some upgrades today with repetitions and weight. Will continue to progress within pt tolerance.  Evaluation/Treatment Tolerance: Patient tolerated  treatment well    The patient will continue to benefit from skilled outpatient occupational therapy in order to address the deficits listed in the problem list on the initial evaluation, provide patient and family education, and maximize the patients level of independence in the home and community environments.     The patient's spiritual, cultural, and educational needs were considered, and the patient is agreeable to the plan of care and goals.           Plan: continue per initial plan of care    Goals:   Active       LTG       IND with HEP (Progressing)       Start:  05/09/25    Expected End:  06/20/25            Pt will demonstrate (left) shoulder AROM WNL grossly for [place] with UB drsg and overhead functional reach by discharge.    (Progressing)       Start:  05/09/25    Expected End:  06/20/25            Pt will report 1/10 pain in (L)shoulder at worst with functional mobility and overhead use by dc.    (Progressing)       Start:  05/09/25    Expected End:  06/20/25            Pt will demonstrate (left) shoulder MMT WNL grossly for [place] with functional activities   (Progressing)       Start:  05/09/25    Expected End:  06/20/25            Patient will be able to achieve less than or equal to 25% on the FOTO, demonstrating overall improved functional ability with upper extremity.  (Self-care category)  (Progressing)       Start:  05/09/25    Expected End:  06/20/25               STG       Initiate HEP (Met)       Start:  05/09/25    Expected End:  06/06/25    Resolved:  05/12/25          Pt will increase shoulder AROM by 10 degrees grossly for improved performance with overhead ADL's   (Progressing)       Start:  05/09/25    Expected End:  06/06/25            Pt will report 5/10 pain in (left)shoulder at worst with functional mobility and overhead use by 4 weeks.    (Progressing)       Start:  05/09/25    Expected End:  06/06/25            Pt will demonstrate increased MMT to 4-/5 grossly left  shoulder for functional reach and carrying, moving, handling objects by 4 weeks.    (Progressing)       Start:  05/09/25    Expected End:  06/06/25            Patient will be able to achieve less than or equal to 50% on the FOTO, demonstrating overall improved functional ability with upper extremity.  (Self-care category)  (Progressing)       Start:  05/09/25    Expected End:  06/06/25                Apurva Mejia, OT

## 2025-06-05 ENCOUNTER — CLINICAL SUPPORT (OUTPATIENT)
Dept: REHABILITATION | Facility: HOSPITAL | Age: 65
End: 2025-06-05
Payer: MEDICARE

## 2025-06-05 DIAGNOSIS — M25.512 ACUTE PAIN OF LEFT SHOULDER: ICD-10-CM

## 2025-06-05 DIAGNOSIS — R52 PAIN AGGRAVATED BY ACTIVITIES OF DAILY LIVING: ICD-10-CM

## 2025-06-05 DIAGNOSIS — M25.612 DECREASED RANGE OF MOTION OF LEFT SHOULDER: Primary | ICD-10-CM

## 2025-06-05 PROCEDURE — 97140 MANUAL THERAPY 1/> REGIONS: CPT

## 2025-06-05 PROCEDURE — 97110 THERAPEUTIC EXERCISES: CPT

## 2025-06-09 DIAGNOSIS — R35.1 BPH ASSOCIATED WITH NOCTURIA: ICD-10-CM

## 2025-06-09 DIAGNOSIS — N40.1 BPH ASSOCIATED WITH NOCTURIA: ICD-10-CM

## 2025-06-09 NOTE — TELEPHONE ENCOUNTER
Refill Routing Note   Medication(s) are not appropriate for processing by [facility] for the following reason(s):        New or recently adjusted medication    [facility] action(s):  Defer             Appointments  past 12m or future 3m with PCP    Date Provider   Last Visit   4/23/2025 Eddi Awan MD   Next Visit   Visit date not found Eddi Awan MD   ED visits in past 90 days: 0        Note composed:10:53 AM 06/09/2025

## 2025-06-09 NOTE — TELEPHONE ENCOUNTER
No care due was identified.  [facility] Embedded Care Due Messages. Reference number: [id number].   6/09/2025 3:26:29 AM CDT

## 2025-06-10 RX ORDER — TAMSULOSIN HYDROCHLORIDE 0.4 MG/1
CAPSULE ORAL
Qty: 90 CAPSULE | Refills: 3 | Status: SHIPPED | OUTPATIENT
Start: 2025-06-10

## 2025-06-12 ENCOUNTER — CLINICAL SUPPORT (OUTPATIENT)
Dept: REHABILITATION | Facility: HOSPITAL | Age: 65
End: 2025-06-12
Payer: MEDICARE

## 2025-06-12 DIAGNOSIS — M25.612 DECREASED RANGE OF MOTION OF LEFT SHOULDER: Primary | ICD-10-CM

## 2025-06-12 DIAGNOSIS — M25.512 ACUTE PAIN OF LEFT SHOULDER: ICD-10-CM

## 2025-06-12 DIAGNOSIS — R52 PAIN AGGRAVATED BY ACTIVITIES OF DAILY LIVING: ICD-10-CM

## 2025-06-12 PROCEDURE — 97110 THERAPEUTIC EXERCISES: CPT

## 2025-06-12 PROCEDURE — 97140 MANUAL THERAPY 1/> REGIONS: CPT

## 2025-06-12 NOTE — PROGRESS NOTES
Outpatient Rehab    Occupational Therapy Progress Note    Patient Name: Osbaldo Brewer  MRN: [id number]  YOB: 1960  Encounter Date: 6/12/2025    Therapy Diagnosis:   Encounter Diagnoses   Name Primary?    Decreased range of motion of left shoulder Yes    Pain aggravated by activities of daily living     Acute pain of left shoulder      Physician: Vick Sanchez,*    Physician Orders: Eval and Treat  Medical Diagnosis: Left shoulder pain, unspecified chronicity  Surgical Diagnosis: Not applicable for this Episode   Surgical Date: Not applicable for this Episode    Visit # / Visits Authorized: 6 / 15  Insurance Authorization Period: 5/8/2025 to 12/31/2025  Date of Evaluation: 5/8/2025  Plan of Care Certification: 5/9/2025 to 7/4/2025      Time In: 0946   Time Out: 1035  Total Time (in minutes): 49   Total Billable Time (in minutes): 49    FOTO:  Intake Score: 36%  Survey Score 2: 63%  Survey Score 3:  %    Precautions:       Subjective   its a little sore in the same spot.         Objective      Shoulder Range of Motion  Left Shoulder   Active (deg) Passive (deg) Pain   Flexion 94       Extension 62       Scaption         ABduction 85       ADduction         Horizontal ABduction         Horizontal ADduction         External Rotation (Shoulder ABducted 0 degrees) 52       External Rotation (Shoulder ABducted 45 degrees)         External Rotation (Shoulder ABducted 90 degrees)         Internal Rotation (Shoulder ABducted 0 degrees)  (L3)       Internal Rotation (Shoulder ABducted 45 degrees)         Internal Rotation (Shoulder ABducted 90 degrees)                          Treatment:  Therapeutic Exercise  TE 1: AAROM 2# dowel (horiz abd/add x10, flexion x20)  TE 2: scap retracts (green band) x20  TE 3: sidelying er x20 1# db  TE 4: cable shoulder extension 7# x10  TE 5: pulleys FF x2 minutes, internal rotation x10 reps  TE 6: UBE x4 min forward, x4 min back level 2  TE 7: serratus  mitzy 2# x20  Manual Therapy  MT 1: PROM to tolerance  MT 2: anterior to posterior glenohumeral joint mobilizations  MT 3: glenohumeral joint distractions x3 x30 sec hold each    Time Entry(in minutes):  Manual Therapy Time Entry: 14  Therapeutic Exercise Time Entry: 35    Assessment & Plan   Assessment: Pt woud continue to benefit from skilled OT services. ROM measures taken today for progress note. His motion has improved in all planes still not WFL of motion. However, this is only our 6th session today. Still with pains at lateral shoulder. FOTO score improved today as well. Will conitnue to progress within pt tolerance.  Evaluation/Treatment Tolerance: Patient tolerated treatment well    The patient will continue to benefit from skilled outpatient occupational therapy in order to address the deficits listed in the problem list on the initial evaluation, provide patient and family education, and maximize the patients level of independence in the home and community environments.     The patient's spiritual, cultural, and educational needs were considered, and the patient is agreeable to the plan of care and goals.           Plan: continue per initial plan of care    Goals:   Active       LTG       IND with HEP (Progressing)       Start:  05/09/25    Expected End:  06/20/25            Pt will demonstrate (left) shoulder AROM WNL grossly for [place] with UB drsg and overhead functional reach by discharge.    (Progressing)       Start:  05/09/25    Expected End:  06/20/25            Pt will report 1/10 pain in (L)shoulder at worst with functional mobility and overhead use by dc.    (Progressing)       Start:  05/09/25    Expected End:  06/20/25            Pt will demonstrate (left) shoulder MMT WNL grossly for [place] with functional activities   (Progressing)       Start:  05/09/25    Expected End:  06/20/25            Patient will be able to achieve less than or equal to 25% on the FOTO, demonstrating  overall improved functional ability with upper extremity.  (Self-care category)  (Progressing)       Start:  05/09/25    Expected End:  06/20/25               STG       Initiate HEP (Met)       Start:  05/09/25    Expected End:  06/06/25    Resolved:  05/12/25          Pt will increase shoulder AROM by 10 degrees grossly for improved performance with overhead ADL's   (Met)       Start:  05/09/25    Expected End:  06/06/25    Resolved:  06/12/25         Pt will report 5/10 pain in (left)shoulder at worst with functional mobility and overhead use by 4 weeks.    (Met)       Start:  05/09/25    Expected End:  06/06/25    Resolved:  06/12/25         Pt will demonstrate increased MMT to 4-/5 grossly left shoulder for functional reach and carrying, moving, handling objects by 4 weeks.    (Progressing)       Start:  05/09/25    Expected End:  06/06/25            Patient will be able to achieve less than or equal to 50% on the FOTO, demonstrating overall improved functional ability with upper extremity.  (Self-care category)  (Met)       Start:  05/09/25    Expected End:  06/06/25    Resolved:  06/12/25             Apurva Mejia, OT

## 2025-06-13 ENCOUNTER — PATIENT MESSAGE (OUTPATIENT)
Dept: ORTHOPEDICS | Facility: CLINIC | Age: 65
End: 2025-06-13
Payer: MEDICARE

## 2025-06-14 ENCOUNTER — PATIENT MESSAGE (OUTPATIENT)
Dept: FAMILY MEDICINE | Facility: CLINIC | Age: 65
End: 2025-06-14
Payer: MEDICARE

## 2025-06-18 ENCOUNTER — OFFICE VISIT (OUTPATIENT)
Dept: PODIATRY | Facility: CLINIC | Age: 65
End: 2025-06-18
Payer: MEDICARE

## 2025-06-18 VITALS
HEIGHT: 75 IN | BODY MASS INDEX: 28.61 KG/M2 | SYSTOLIC BLOOD PRESSURE: 147 MMHG | HEART RATE: 87 BPM | RESPIRATION RATE: 18 BRPM | WEIGHT: 230.13 LBS | DIASTOLIC BLOOD PRESSURE: 83 MMHG

## 2025-06-18 DIAGNOSIS — M20.12 HALLUX ABDUCTO VALGUS, BILATERAL: ICD-10-CM

## 2025-06-18 DIAGNOSIS — M79.672 CHRONIC FOOT PAIN, LEFT: ICD-10-CM

## 2025-06-18 DIAGNOSIS — M21.6X2 PRONATION DEFORMITY OF BOTH FEET: ICD-10-CM

## 2025-06-18 DIAGNOSIS — R60.0 EDEMA OF LEFT FOOT: ICD-10-CM

## 2025-06-18 DIAGNOSIS — M21.6X1 PRONATION DEFORMITY OF BOTH FEET: ICD-10-CM

## 2025-06-18 DIAGNOSIS — G57.92 PERIPHERAL NEURITIS OF LEFT FOOT: Primary | ICD-10-CM

## 2025-06-18 DIAGNOSIS — S99.922S INJURY OF LEFT FOOT, SEQUELA: ICD-10-CM

## 2025-06-18 DIAGNOSIS — G89.29 CHRONIC FOOT PAIN, LEFT: ICD-10-CM

## 2025-06-18 DIAGNOSIS — M20.11 HALLUX ABDUCTO VALGUS, BILATERAL: ICD-10-CM

## 2025-06-18 PROCEDURE — 99999PBSHW PR PBB SHADOW TECHNICAL ONLY FILED TO HB: Mod: PBBFAC,,,

## 2025-06-18 PROCEDURE — 96372 THER/PROPH/DIAG INJ SC/IM: CPT | Mod: PBBFAC | Performed by: PODIATRIST

## 2025-06-18 PROCEDURE — 99204 OFFICE O/P NEW MOD 45 MIN: CPT | Mod: 25,S$PBB,, | Performed by: PODIATRIST

## 2025-06-18 PROCEDURE — 99214 OFFICE O/P EST MOD 30 MIN: CPT | Mod: PBBFAC | Performed by: PODIATRIST

## 2025-06-18 PROCEDURE — 99999 PR PBB SHADOW E&M-EST. PATIENT-LVL IV: CPT | Mod: PBBFAC,,, | Performed by: PODIATRIST

## 2025-06-18 RX ORDER — BETAMETHASONE SODIUM PHOSPHATE AND BETAMETHASONE ACETATE 3; 3 MG/ML; MG/ML
18 INJECTION, SUSPENSION INTRA-ARTICULAR; INTRALESIONAL; INTRAMUSCULAR; SOFT TISSUE
Status: COMPLETED | OUTPATIENT
Start: 2025-06-18 | End: 2025-06-18

## 2025-06-18 RX ORDER — KETOROLAC TROMETHAMINE 30 MG/ML
60 INJECTION, SOLUTION INTRAMUSCULAR; INTRAVENOUS
Status: COMPLETED | OUTPATIENT
Start: 2025-06-18 | End: 2025-06-18

## 2025-06-18 RX ADMIN — BETAMETHASONE ACETATE AND BETAMETHASONE SODIUM PHOSPHATE 18 MG: 3; 3 INJECTION, SUSPENSION INTRA-ARTICULAR; INTRALESIONAL; INTRAMUSCULAR; SOFT TISSUE at 09:06

## 2025-06-18 RX ADMIN — KETOROLAC TROMETHAMINE 60 MG: 30 INJECTION INTRAMUSCULAR; INTRAVENOUS at 09:06

## 2025-06-19 NOTE — PROGRESS NOTES
Subjective:    Patient ID:  Osbaldo Brewer is a 64 y.o. male who presents for follow-up of No chief complaint on file.      TELEMEDICINE VISIT      Problem List Items Addressed This Visit          Cardiac/Vascular    Mixed hyperlipidemia (Chronic)    Hypercholesterolemia - Primary       HPI    Patient was last seen on 09/27/2024 at which time nuclear stress test was ordered that showed no evidence of ischemia.  Carotid Doppler and event monitor were also ordered which showed no acute issues.    Patient presents for telemedicine visit.    History of Present Illness    CHIEF COMPLAINT:  Mr. Brewer presents today for follow up    SURGICAL HISTORY:  He underwent surgery in December and is currently working on rehabilitation.    FALLS HISTORY:  He reports no falls since last year. Multiple medical evaluations were performed by his current provider and primary care physician to investigate potential causes of previous falls, but no definitive etiology was identified.    THYROID:  Recent labs revealed elevated thyroid levels, though his primary care physician did not express significant concern about the findings.    BLOOD PRESSURE:  He reports stable BP that is consistently normal during routine medical visits.    Parts of this note were transcribed using voice recognition and generative artificial intelligence software ([employer]).  Please excuse any grammatical or syntax errors and reach out to me with any questions or clarifications needed.         Further falls - None    Home BP - OK per his report           Objective:   There were no vitals filed for this visit.    BP Readings from Last 5 Encounters:   06/18/25 (!) 147/83   04/23/25 124/68   12/04/24 138/85   11/19/24 124/76   09/18/24 126/82        Physical Exam  Constitutional:       General: He is not in acute distress.     Appearance: He is well-developed. He is not diaphoretic.   HENT:      Head: Normocephalic and atraumatic.   Eyes:       General: No scleral icterus.     Conjunctiva/sclera: Conjunctivae normal.   Pulmonary:      Effort: No respiratory distress.      Breath sounds: No stridor.   Musculoskeletal:         General: No signs of injury.      Cervical back: Normal range of motion.   Skin:     Coloration: Skin is not jaundiced.   Neurological:      Mental Status: He is alert. Mental status is at baseline.   Psychiatric:         Mood and Affect: Mood normal.         Behavior: Behavior normal.             Current Outpatient Medications   Medication Instructions    atorvastatin (LIPITOR) 40 mg, Oral, Daily    azelastine (ASTELIN) 274 mcg, Nasal, 2 times daily    COQ10, UBIQUINOL, ORAL Take by mouth.    famotidine (PEPCID) 40 mg, Oral, 2 times daily    fluorouraciL (EFUDEX) 5 % cream AAA face bid x 2-4 weeks    ipratropium (ATROVENT) 42 mcg (0.06 %) nasal spray 2 sprays, Each Nostril, 3 times daily PRN    ketoconazole (NIZORAL) 2 % cream Apply twice a day to affected areas of skin    meloxicam (MOBIC) 15 mg, Oral, Daily    tamsulosin (FLOMAX) 0.4 mg Cap TAKE 1 CAPSULE(0.4 MG) BY MOUTH DAILY       Lipid Panel:   Lab Results   Component Value Date    CHOL 184 04/29/2025    HDL 52 04/29/2025    LDLCALC 115.0 04/29/2025    TRIG 85 04/29/2025    CHOLHDL 28.3 04/29/2025       The 10-year ASCVD risk score (Eliud CARY, et al., 2019) is: 14.8%    Values used to calculate the score:      Age: 64 years      Sex: Male      Is Non- : No      Diabetic: No      Tobacco smoker: No      Systolic Blood Pressure: 147 mmHg      Is BP treated: No      HDL Cholesterol: 52 mg/dL      Total Cholesterol: 184 mg/dL    Most Recent EKG Results  Results for orders placed or performed in visit on 09/18/24   EKG 12-lead    Collection Time: 09/18/24  1:38 PM   Result Value Ref Range    QRS Duration 86 ms    [facility] QTC Calculation 420 ms    Narrative    Test Reason : R55,    Vent. Rate : 069 BPM     Atrial Rate : 069 BPM     P-R Int : 140 ms          QRS  Dur : 086 ms      QT Int : 392 ms       P-R-T Axes : 043 -07 057 degrees     QTc Int : 420 ms    Normal sinus rhythm  Normal ECG  When compared with ECG of 11-APR-2024 09:49,  No significant change was found  Confirmed by Christiana Crespo MD ([id number]) on 9/18/2024 3:35:56 PM    Referred By: JUDI HERBERT           Confirmed By:Christiana Crespo MD       Most Recent Echocardiogram Results  Results for orders placed during the hospital encounter of 09/26/24    Echo    Interpretation Summary    Left Ventricle: The left ventricle is normal in size. Normal wall thickness. There is concentric remodeling. There is normal systolic function. Ejection fraction by visual approximation is 65%. There is normal diastolic function.    Right Ventricle: Normal right ventricular cavity size. Wall thickness is normal. Systolic function is normal.    Aortic Valve: The aortic valve is a trileaflet valve.    Pulmonary Artery: The estimated pulmonary artery systolic pressure is 20 mmHg.    IVC/SVC: Normal venous pressure at 3 mmHg.      Most Recent Nuclear Stress Test Results  Results for orders placed during the hospital encounter of 10/14/24    Nuclear Stress - Cardiology Interpreted    Interpretation Summary    Normal myocardial perfusion scan. There is no evidence of myocardial ischemia or infarction.    There is a mild intensity fixed perfusion abnormality in the inferior wall of the left ventricle secondary to diaphragm attenuation.    The gated perfusion images showed an ejection fraction of 66% at rest.    There is normal wall motion at rest.    The ECG portion of the study is negative for ischemia.    The patient reported no chest pain during the stress test.    There are no prior studies for comparison.      Most Recent Cardiac PET Stress Test Results  No results found for this or any previous visit.      Most Recent Cardiovascular Angiogram results  No results found for this or any previous visit.      Other Most Recent Cardiology  Results  Results for orders placed during the hospital encounter of 10/14/24    Cardiac event monitor    Interpretation Summary    30 day event monitor (28 days and 8 hours analyzed)    Heart rates  beats per minute (average heart rate 79 beats per minute)    No PACs or PVCs    No tachyarrhythmias, including atrial fibrillation or atrial flutter    No clinically significant bradyarrhythmias, including AV block    No patient triggered events    Overall unremarkable cardiac event monitor        All pertinent data including labs, imaging, EKGs, and studies listed above were reviewed.  Patient's most recent EKG tracing was personally interpreted by this provider.    Diagnoses:       1. Hypercholesterolemia    2. Mixed hyperlipidemia         Plan for treatment of the above diagnoses:     Assessment & Plan    Elevated thyroid levels from recent labs, but deemed not clinically significant.  No major changes required in current treatment plan.    PLAN SUMMARY:   Continue current medication regimen.    PLAN NOTE:   Continued current medication regimen.         Continue atorvastatin 40 mg PO Daily   Continue coenzyme Q10    Continue other cardiac medications  Mediterranean Diet/Cardiovascular Exercise Program    Visit today included increased complexity associated with the care of the episodic problem(s) addressed above in addition to managing the longitudinal care of the patient due to the serious and/or complex managed problem(s) listed above.    Patient queried and all questions were answered.    Parts of this note were transcribed using voice recognition and generative artificial intelligence software ([employer]).  Please excuse any grammatical or syntax errors and reach out to me with any questions or clarifications needed.    F/u in 1 year to reassess    The patient location is: [place]   The chief complaint leading to consultation is:  Please see problem list above  Visit type: Virtual visit with  synchronous audio and video  Each patient to whom he or she provides medical services by telemedicine is:  (1) informed of the relationship between the physician and patient and the respective role of any other health care provider with respect to management of the patient; and (2) notified that he or she may decline to receive medical services by telemedicine and may withdraw from such care at any time.    Notes: See above       Signed:    Rafa Pa MD  6/20/2025 3:55 PM

## 2025-06-20 ENCOUNTER — PATIENT MESSAGE (OUTPATIENT)
Dept: CARDIOLOGY | Facility: CLINIC | Age: 65
End: 2025-06-20

## 2025-06-20 ENCOUNTER — OFFICE VISIT (OUTPATIENT)
Dept: CARDIOLOGY | Facility: CLINIC | Age: 65
End: 2025-06-20
Payer: MEDICARE

## 2025-06-20 DIAGNOSIS — E78.2 MIXED HYPERLIPIDEMIA: Chronic | ICD-10-CM

## 2025-06-20 DIAGNOSIS — E78.00 HYPERCHOLESTEROLEMIA: Primary | ICD-10-CM

## 2025-06-21 PROBLEM — M21.6X2 PRONATION DEFORMITY OF BOTH FEET: Status: ACTIVE | Noted: 2025-06-21

## 2025-06-21 PROBLEM — M21.6X1 PRONATION DEFORMITY OF BOTH FEET: Status: ACTIVE | Noted: 2025-06-21

## 2025-06-21 PROBLEM — S99.922A INJURY OF LEFT FOOT: Status: ACTIVE | Noted: 2025-06-21

## 2025-06-21 PROBLEM — M20.11 HALLUX ABDUCTO VALGUS, BILATERAL: Status: ACTIVE | Noted: 2025-06-21

## 2025-06-21 PROBLEM — M20.12 HALLUX ABDUCTO VALGUS, BILATERAL: Status: ACTIVE | Noted: 2025-06-21

## 2025-06-21 NOTE — PROGRESS NOTES
Subjective:       Patient ID: Osbaldo Brewer is a 64 y.o. male.    Chief Complaint: Numbness (Left Foot Great Toe) and Foot Swelling (Left Foot)  Patient presents with complaint pain, numbness foot swelling left.  Relates a history of injury to this foot, fractured 5th metatarsal occurred 4/24.  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  December 4, 2024 5th metatarsal fracture was fixated by Dr. Ramirez with a plate.  Does understand his prolonged treatment may have contributed but he has had pain and swelling of the foot ever since the injury and over the last 6 months or so continued swelling numbness, radiating pain.  He did see Dr. Ramirez told him everything regarding fixation of the fracture was normal.  Patient presents in an excellent pair of kas today with complaint of chronic swelling, pain level 2/10 and escalates    Past Medical History:   Diagnosis Date    Basal cell carcinoma     BPH associated with nocturia 01/11/2023    Erectile dysfunction 01/11/2023    History of Helicobacter pylori infection 01/11/2023    History of sinus surgery 01/11/2023    Mixed hyperlipidemia 08/29/2014     Past Surgical History:   Procedure Laterality Date    FUNCTIONAL ENDOSCOPIC SINUS SURGERY (FESS) USING COMPUTER-ASSISTED NAVIGATION Bilateral 3/31/2023    Procedure: SINUS SURGERY FUNCTIONAL ENDOSCOPIC WITH NAVIGATION;  Surgeon: Neville Navarrete MD;  Location: [facility] OR;  Service: ENT;  Laterality: Bilateral;    NASAL SEPTOPLASTY Bilateral 3/31/2023    Procedure: SEPTOPLASTY;  Surgeon: Neville Navarrete MD;  Location: [facility] OR;  Service: ENT;  Laterality: Bilateral;    OPEN REDUCTION AND INTERNAL FIXATION (ORIF) OF FRACTURE OF METATARSAL BONE Left 12/4/2024    Procedure: ORIF, FRACTURE, METATARSAL BONE;  Surgeon: Milo Ramirez MD;  Location: [facility]  "OR;  Service: Orthopedics;  Laterality: Left;  5TH METATARSAL NONUNION TAKEDOWN    SINUS SURGERY      WISDOM TOOTH EXTRACTION      states woke up during procedure     No family history on file.  Social History     Socioeconomic History    Marital status:    Tobacco Use    Smoking status: Never     Passive exposure: Never    Smokeless tobacco: Never   Substance and Sexual Activity    Alcohol use: Yes     Comment: occ    Drug use: Never    Sexual activity: Yes     Social Drivers of Health     Financial Resource Strain: Low Risk  (6/19/2025)    Overall Financial Resource Strain (CARDIA)     Difficulty of Paying Living Expenses: Not hard at all   Food Insecurity: No Food Insecurity (6/19/2025)    Hunger Vital Sign     Worried About Running Out of Food in the Last Year: Never true     Ran Out of Food in the Last Year: Never true   Transportation Needs: No Transportation Needs (6/19/2025)    PRAPARE - Transportation     Lack of Transportation (Medical): No     Lack of Transportation (Non-Medical): No   Physical Activity: Unknown (6/19/2025)    Exercise Vital Sign     Days of Exercise per Week: 0 days     Minutes of Exercise per Session: Patient declined   Stress: Stress Concern Present (6/19/2025)    [employer] - Occupational Stress Questionnaire     Feeling of Stress : To some extent   Housing Stability: Low Risk  (6/19/2025)    Housing Stability Vital Sign     Unable to Pay for Housing in the Last Year: No     Homeless in the Last Year: No       Current Medications[1]  Review of patient's allergies indicates:   Allergen Reactions    Oxycodone-acetaminophen Rash       Review of Systems   Musculoskeletal:  Positive for arthralgias and gait problem.   All other systems reviewed and are negative.      Objective:      Vitals:    06/18/25 0848   BP: (!) 147/83   Pulse: 87   Resp: 18   Weight: 104.4 kg (230 lb 1.6 oz)   Height: 6' 3" (1.905 m)     Physical Exam  Vitals and nursing note " reviewed.   Constitutional:       General: He is not in acute distress.     Appearance: Normal appearance.   Cardiovascular:      Pulses:           Dorsalis pedis pulses are 2+ on the right side and 2+ on the left side.        Posterior tibial pulses are 2+ on the right side and 2+ on the left side.      Heart sounds:      Gallop: Paresthesias left foot.   Musculoskeletal:         General: Swelling, tenderness and deformity present.      Right foot: Decreased range of motion. Deformity and bunion present.      Left foot: Decreased range of motion (Limited midfoot and forefoot range of motion). Deformity and bunion present.      Comments: Pain along the medial plantar nerve and upon compression deep peroneal nerve left foot with pedal edema   Feet:      Right foot:      Skin integrity: Skin integrity normal.      Left foot:      Skin integrity: Skin integrity normal.   Skin:     Capillary Refill: Capillary refill takes less than 2 seconds.   Neurological:      General: No focal deficit present.      Mental Status: He is alert.   Psychiatric:         Thought Content: Thought content normal.                        EXAMINATION:  XR FOOT COMPLETE 3 VIEW LEFT     CLINICAL HISTORY:  .  Displaced fracture of fifth metatarsal bone, left foot, initial encounter for closed fracture     TECHNIQUE:  AP, lateral and oblique views of the left foot were performed.     COMPARISON:  March 18, 2025     FINDINGS:  A sideplate and multiple screws bridge the prior 5th metatarsal shaft fracture site.  There is joint space narrowing at the 1st metatarsophalangeal joint.  Moderate midfoot degenerative changes are noted and there is a degenerative type plantar calcaneal spur.  There is a hallux valgus deformity.     Impression:     Prior ORIF of a 5th metatarsal shaft fracture without apparent complication.     Underlying a hallux valgus deformity with forefoot and midfoot degenerative changes.        Electronically signed by:Eddi Hyde  MD  Date:                                            04/29/2025    Assessment:       1. Peripheral neuritis of left foot    2. Chronic foot pain, left    3. Edema of left foot    4. Injury of left foot, sequela    5. Pronation deformity of both feet    6. Hallux abducto valgus, bilateral        Plan:         18 MG BETAMETHASONE IM LEFT HIP  60 MG TORADOL IM RIGHT HIP  PHYSICAL THERAPY      Reviewed previous x-rays, initial injury to the 5th metatarsal is an extensive diagonal fracture with displacement.  Most recent x-rays show excellent aligned and well healed fracture with intact plate  Advised patient although the area of the fracture has healed he is demonstrating few areas of neuritis/inflamed nerves.  Advised patient he could have some damage to the underlying nerves but hopefully most of it is inflammatory related  We discussed to nerves, plantar medial and deep peroneal affecting the medial aspect of his foot, causing numbness to the big toe  Advised patient inflammation of these nerves affects the function of his foot and by the end of the day can cause a significant amount of swelling of the foot and ankle and pain can radiate up the lower leg  Explained to patient this definitely can be due to compensating as it is in the opposite part of his foot where he had the initial injury/surgery  In addition upon weight-bearing patient has HAV deformity, pronation deformity of both feet  Showed patient a corrected position of his feet in compared to a relaxed position  Explained this also puts additional pressure on the medial column/inside portion of his foot where he is having majority of the symptoms  We discussed use of ankle sleeve for compression and support applied daily and removed each evening  Advised patient topical treatments for inflammation or absolutely crucial due to the length of time this has been present  We discussed ice/cool therapy  Reviewed Voltaren gel over-the-counter and how to  apply  Additionally we discussed use of Voltaren gel and over-the-counter 4% lidocaine patch  Highly recommended physical therapy which patient was very receptive to  Order sent [facility] in [facility] [place]  We discussed IM steroid/cortisone and IM Toradol injection, effectiveness in decreasing inflammation, potential side effects and length of time injections may be beneficial.  Advised patient these are recommended due to the chronicity of his pain, most likely inflammation of the nerves from the initial injury 4/24, therefore this is been gone on for 14 months.  Explained along with these injections all other topical treatments need to be utilized 3 times daily for best results  Advised [place] as 1 of the best shoes I would recommend for his condition  But we had a lengthy discussion regarding arch supports, needs to be firm, full length and gradually adjust to wearing comfortably  Until pain and swelling of the left foot resolve he should have tennis shoes on at all times even indoors  Patient was in understanding and agreement with treatment plan.  I counseled the patient on their conditions, implications and medical management.  Instructed patient to contact the office with any changes, questions, concerns, worsening of symptoms.   Total face to face time 45 minutes, exam, assessment, treatment, discussion, additional time for review of chart prior to and following appointment and visit documentation, consultation and coordination of care.    Follow up after physical therapy    This note was created using M*[employer] voice recognition software that occasionally misinterpreted phrases or words.           [1]   Current Outpatient Medications   Medication Sig Dispense Refill    atorvastatin (LIPITOR) 40 MG tablet Take 1 tablet (40 mg total) by mouth once daily. 90 tablet 3    azelastine (ASTELIN) 137 mcg (0.1 %) nasal spray 2 sprays (274 mcg total) by Nasal route 2 (two) times daily. 30 mL 6    COQ10, UBIQUINOL, ORAL Take by  mouth.      famotidine (PEPCID) 40 MG tablet Take 1 tablet (40 mg total) by mouth 2 (two) times daily. 30 tablet 11    fluorouraciL (EFUDEX) 5 % cream AAA face bid x 2-4 weeks 40 g 1    ipratropium (ATROVENT) 42 mcg (0.06 %) nasal spray 2 sprays by Each Nostril route 3 (three) times daily as needed (runny nose (rhinitis)). 15 mL 11    ketoconazole (NIZORAL) 2 % cream Apply twice a day to affected areas of skin 60 g 2    meloxicam (MOBIC) 15 MG tablet Take 1 tablet (15 mg total) by mouth once daily. 30 tablet 1    tamsulosin (FLOMAX) 0.4 mg Cap TAKE 1 CAPSULE(0.4 MG) BY MOUTH DAILY 90 capsule 3     Current Facility-Administered Medications   Medication Dose Route Frequency Provider Last Rate Last Admin    betamethasone acetate-betamethasone sodium phosphate injection 18 mg  18 mg Intramuscular 1 time in Clinic/HOD         ketorolac injection 60 mg  60 mg Intramuscular 1 time in Clinic/HOD

## 2025-06-23 ENCOUNTER — OFFICE VISIT (OUTPATIENT)
Facility: CLINIC | Age: 65
End: 2025-06-23
Payer: MEDICARE

## 2025-06-23 VITALS — HEIGHT: 75 IN | WEIGHT: 230 LBS | BODY MASS INDEX: 28.6 KG/M2

## 2025-06-23 DIAGNOSIS — L81.4 LENTIGINES: ICD-10-CM

## 2025-06-23 DIAGNOSIS — L57.0 AK (ACTINIC KERATOSIS): ICD-10-CM

## 2025-06-23 DIAGNOSIS — D22.9 MULTIPLE BENIGN NEVI: Primary | ICD-10-CM

## 2025-06-23 DIAGNOSIS — Z12.83 SCREENING FOR SKIN CANCER: ICD-10-CM

## 2025-06-23 DIAGNOSIS — D48.5 NEOPLASM OF UNCERTAIN BEHAVIOR OF SKIN: ICD-10-CM

## 2025-06-23 DIAGNOSIS — L73.8 SEBACEOUS HYPERPLASIA: ICD-10-CM

## 2025-06-23 DIAGNOSIS — D18.01 CHERRY ANGIOMA: ICD-10-CM

## 2025-06-23 DIAGNOSIS — Z85.828 HISTORY OF NONMELANOMA SKIN CANCER: ICD-10-CM

## 2025-06-23 DIAGNOSIS — L82.1 SK (SEBORRHEIC KERATOSIS): ICD-10-CM

## 2025-06-23 PROCEDURE — 17000 DESTRUCT PREMALG LESION: CPT | Mod: PBBFAC,PN | Performed by: DERMATOLOGY

## 2025-06-23 PROCEDURE — 88305 TISSUE EXAM BY PATHOLOGIST: CPT | Mod: TC | Performed by: DERMATOLOGY

## 2025-06-23 PROCEDURE — 99213 OFFICE O/P EST LOW 20 MIN: CPT | Mod: PBBFAC,PN,25 | Performed by: DERMATOLOGY

## 2025-06-23 PROCEDURE — 99999 PR PBB SHADOW E&M-EST. PATIENT-LVL III: CPT | Mod: PBBFAC,,, | Performed by: DERMATOLOGY

## 2025-06-23 PROCEDURE — 17003 DESTRUCT PREMALG LES 2-14: CPT | Mod: S$PBB,,, | Performed by: DERMATOLOGY

## 2025-06-23 PROCEDURE — 17000 DESTRUCT PREMALG LESION: CPT | Mod: S$PBB,XS,, | Performed by: DERMATOLOGY

## 2025-06-23 PROCEDURE — 11102 TANGNTL BX SKIN SINGLE LES: CPT | Mod: PBBFAC,PN | Performed by: DERMATOLOGY

## 2025-06-23 PROCEDURE — 99213 OFFICE O/P EST LOW 20 MIN: CPT | Mod: 25,S$PBB,, | Performed by: DERMATOLOGY

## 2025-06-23 PROCEDURE — 17003 DESTRUCT PREMALG LES 2-14: CPT | Mod: PBBFAC,PN | Performed by: DERMATOLOGY

## 2025-06-23 PROCEDURE — 11102 TANGNTL BX SKIN SINGLE LES: CPT | Mod: S$PBB,,, | Performed by: DERMATOLOGY

## 2025-06-23 NOTE — PATIENT INSTRUCTIONS
Shave Biopsy Wound Care    Your doctor has performed a shave biopsy today.  A band aid and vaseline ointment has been placed over the site.  This should remain in place for NO LONGER THAN 48 hours.  It is fine to remove the bandaid after 24 hours, if the area is no longer bleeding. It is recommended that you keep the area dry (do not wet)) for the first 24 hours.  After 24 hours, wash the area with warm soap and water and apply Vaseline jelly.  Many patients prefer to use Neosporin or Bacitracin ointment.  This is acceptable; however, know that you can develop an allergy to this medication even if you have used it safely for years.  It is important to keep the area moist.  Letting it dry out and get air slows healing time, and will worsen the scar.        If you notice increasing redness, tenderness, pain, or yellow drainage at the biopsy site, please notify your doctor.  These are signs of an infection.    If your biopsy site is bleeding, apply firm pressure for 15 minutes straight.  Repeat for another 15 minutes, if it is still bleeding.   If the surgical site continues to bleed, then please contact your doctor.      For [facility] users:   You will receive your biopsy results in [facility] as soon as they are available. Please be assured that your physician/provider will review your results and will then determine what further treatment, evaluation, or planning is required. You should be contacted by your physician's/provider's office within 5 business days of receiving your results; If not, please reach out to directly. This is one more way [facility] is putting you first.     [facility] [place] [address]/ [phone] FAX/ [email or web address]     CRYOSURGERY      Your doctor has used a method called cryosurgery to treat your skin condition. Cryosurgery refers to the use of very cold substances to treat a variety of skin conditions such as warts, pre-skin cancers, molluscum contagiosum,  sun spots, and several benign growths. The substance we use in cryosurgery is liquid nitrogen and is so cold (-195 degrees Celsius) that is burns when administered.     Following treatment in the office, the skin may immediately burn and become red. You may find the area around the lesion is affected as well. It is sometimes necessary to treat not only the lesion, but a small area of the surrounding normal skin to achieve a good response.     A blister, and even a blood filled blister, may form after treatment.   This is a normal response. If the blister is painful, it is acceptable to sterilize a needle and with rubbing alcohol and gently pop the blister. It is important that you gently wash the area with soap and warm water as the blister fluid may contain wart virus if a wart was treated. Do no remove the roof of the blister.     The area treated can take anywhere from 1-3 weeks to heal. Healing time depends on the kind skin lesion treated, the location, and how aggressively the lesion was treated. It is recommended that the areas treated are covered with Vaseline or bacitracin ointment and a band-aid. If a band-aid is not practical, just ointment applied several times per day will do. Keeping these areas moist will speed the healing time.    Treatment with liquid nitrogen can leave a scar. In dark skin, it may be a light or dark scar, in light skin it may be a white or pink scar. These will generally fade with time, but may never go away completely.     If you have any concerns after your treatment, please feel free to call the office.       [facility] [place] [address]/ [phone] FAX/ [email or web address] What Are the Symptoms of Skin Cancer?  A change in your skin is the most common sign of skin cancer. This could be a new growth, a sore that doesnt heal, or a change in a mole. Not all skin cancers look the same.    For melanoma specifically, a simple way to remember the warning  signs is to remember the A-B-C-D-Es of melanoma--    A stands for asymmetrical. Does the mole or spot have an irregular shape with two parts that look very different?  B stands for border. Is the border irregular or jagged?  C is for color. Is the color uneven?  D is for diameter. Is the mole or spot larger than the size of a pea?  E is for evolving. Has the mole or spot changed during the past few weeks or months?    Talk to your doctor if you notice changes in your skin such as a new growth, a sore that doesnt heal, a change in an old growth, or any of the A-B-C-D-Es of melanoma    What Can I Do to Reduce My Risk of Skin Cancer?  Protection from ultraviolet (UV) radiation is important all year, not just during the summer or at the beach. UV rays from the sun can reach you on cloudy and hazy days, not just on bright and barbara days. UV rays also reflect off of surfaces like water, cement, sand, and snow. Indoor tanning (using a tanning bed, soriano, or sunlamp to get tan) exposes users to UV radiation.    The hours between 10 a.m. and 4 p.m. Daylight Saving Time (9 a.m. to 3 p.m. standard time) are the most hazardous for UV exposure outdoors in the continental [place]. UV rays from sunlight are the greatest during the late spring and early summer in [place].    CDC recommends easy options for protection from UV radiation--    Stay in the shade or indoors, especially during midday hours.  Wear clothing that covers your arms and legs.  Wear a hat with a wide brim to shade your face, head, ears, and neck.  Wear sunglasses that wrap around and block both UVA and UVB rays.  Use sunscreen with a sun protection factor (SPF) of 30 or higher, and both UVA and UVB (broad spectrum) protection.  Avoid indoor tanning.    Adapted from https://www.cdc.gov/cancer/skin/basic_info/

## 2025-06-23 NOTE — PROGRESS NOTES
Subjective:      Patient ID:  Osbaldo Brewer is a 64 y.o. male who presents for   Chief Complaint   Patient presents with    Spot     On forehead, shoulders      HPI  Established patient.  Here today for total body skin exam.      +NMSC  BCC at R chest, R arm    +AK  Cryotherapy, Efudex     Review of Systems    Objective:   Physical Exam   Constitutional: He appears well-developed and well-nourished. He is cooperative.   HENT:   Head: Normocephalic and atraumatic.   Eyes: Lids are normal. Lids are normal.  Right conjunctiva is not injected. Left conjunctiva is not injected. No conjunctival no injection.   Pulmonary/Chest: No respiratory distress.   Musculoskeletal:      Right lower leg: No edema.      Left lower leg: No edema.   Neurological: He is alert and oriented to person, place, and time.   Psychiatric: He has a normal mood and affect. His speech is normal and behavior is normal. Mood, affect, judgment and thought content normal.   Skin:   Areas Examined (abnormalities noted in diagram):   Scalp / Hair Palpated and Inspected  Head / Face Inspection Performed  Neck Inspection Performed  Chest / Axilla Inspection Performed  Abdomen Inspection Performed  Genitals / Buttocks / Groin Inspection Performed  Back Inspection Performed  RUE Inspected  LUE Inspection Performed  RLE Inspected  LLE Inspection Performed  Nails and Digits Inspection Performed                     Diagram Legend     Erythematous scaling macule/papule c/w actinic keratosis       Vascular papule c/w angioma      Pigmented verrucoid papule/plaque c/w seborrheic keratosis      Yellow umbilicated papule c/w sebaceous hyperplasia      Irregularly shaped tan macule c/w lentigo     1-2 mm smooth white papules consistent with Milia      Movable subcutaneous cyst with punctum c/w epidermal inclusion cyst      Subcutaneous movable cyst c/w pilar cyst      Firm pink to brown papule c/w dermatofibroma      Pedunculated fleshy papule(s) c/w skin  tag(s)      Evenly pigmented macule c/w junctional nevus     Mildly variegated pigmented, slightly irregular-bordered macule c/w mildly atypical nevus      Flesh colored to evenly pigmented papule c/w intradermal nevus       Pink pearly papule/plaque c/w basal cell carcinoma      Erythematous hyperkeratotic cursted plaque c/w SCC      Surgical scar with no sign of skin cancer recurrence      Open and closed comedones      Inflammatory papules and pustules      Verrucoid papule consistent consistent with wart     Erythematous eczematous patches and plaques     Dystrophic onycholytic nail with subungual debris c/w onychomycosis     Umbilicated papule    Erythematous-base heme-crusted tan verrucoid plaque consistent with inflamed seborrheic keratosis     Erythematous Silvery Scaling Plaque c/w Psoriasis     See annotation        Assessment / Plan:      Pathology Orders:       Normal Orders This Visit    Specimen to Pathology, Dermatology     Questions:    Procedure Type: Dermatology and skin neoplasms    Number of Specimens: 1    ------------------------: -------------------------    Spec 1 Procedure: Shave Biopsy    Spec 1 Clinical Impression: r/o bcc    Spec 1 Source: left upper arm    Clinical Information: see EPIC    Clinical History: see EPIC    Specimen Source: Skin    Release to patient: Immediate    Send normal result to authorizing provider's In Basket if patient is active on MyChart: Yes          NUB  - Discussed diagnosis with patient and explained uncertain nature of condition, including differential DDX.   - Discussed treatment options (biopsy, close monitoring) with patient, including the risks and benefits of each. Patient opted to pursue biopsy.  - Shave Biopsy Procedure Note: Discussed procedure with patient/patient's guardian including risks and benefits as well as treatment alternatives. Risks of procedure include pain, bleeding, infection, post-inflammatory pigmentary alteration, scar, recurrence.  Patient informed that the purpose of a biopsy is sampling of condition in question rather than removal in entirety; further treatment may be necessary. Verbal consent obtained. Area to be biopsied marked and cleansed with alcohol. Local anesthesia achieved by injecting approximately 1 cc of 1% lidocaine with epinephrine. One shave biopsy performed using a double edge razor blade; specimen submitted to pathology. Hemostasis achieved with aluminum chloride. Petroleum jelly and bandage applied to wound. Patient tolerated procedure well. After-visit wound care instructions reviewed and provided in writing.     AK (actinic keratosis)  - Discussed diagnosis, etiology, and precancerous nature of condition.   - Cryosurgery Procedure Note: Discussed procedure with patient/patient's guardian including risks and benefits as well as treatment alternatives. Risks of procedure include pain, itching, swelling, redness, blistering, crusting, wound formation, post-inflammatory pigmentary alteration, scar, recurrence. Verbal consent obtained. LN2 cryosurgery performed to 8 lesion(s). Patient tolerated procedure well. After-visit wound care instructions reviewed and provided in writing.       Multiple benign nevi  - Discussed diagnosis, etiology, and benign-nature of condition.  - Reassured; no lesions suspicious for malignancy noted on exam today.   - Recommended routine self examination of skin. Discussed the ABCDEs of melanoma and ugly duckling sign.   - Recommended daily sun protection, including the use of OTC broad-spectrum sunscreen (SPF 30 or greater) and sun-protective clothing.      Lentigines  - Benign; reassured treatment not necessary.   - Recommended daily sun protection, including the use of OTC broad-spectrum sunscreen (SPF 30 or greater) and sun-protective clothing.       SK (seborrheic keratosis)  Cherry angioma  - Benign; reassured treatment not necessary.     History of nonmelanoma skin cancer  Screening for skin  cancer  - Total body skin examination performed today.  - Findings listed above.   - Recommended routine self examination of skin.    - Recommended daily sun protection, including the use of OTC broad-spectrum sunscreen (SPF 30 or greater) and sun-protective clothing.          Follow up in about 6 months (around 12/23/2025) for f/u aks, sooner PRN, sooner pending pathology.

## 2025-06-25 ENCOUNTER — RESULTS FOLLOW-UP (OUTPATIENT)
Facility: CLINIC | Age: 65
End: 2025-06-25
Payer: MEDICARE

## 2025-06-25 DIAGNOSIS — C44.619 BCC (BASAL CELL CARCINOMA), ARM, LEFT: Primary | ICD-10-CM

## 2025-06-25 LAB
ESTROGEN SERPL-MCNC: NORMAL PG/ML
INSULIN SERPL-ACNC: NORMAL U[IU]/ML
LAB AP CLINICAL INFORMATION: NORMAL
LAB AP GROSS DESCRIPTION: NORMAL
LAB AP REPORT FOOTNOTES: NORMAL
T3RU NFR SERPL: NORMAL %

## 2025-06-25 NOTE — PROGRESS NOTES
1. Skin, left upper arm, shave biopsy:   - BASAL CELL CARCINOMA, SUPERFICIAL TYPE.  - MARGINS ARE NEGATIVE IN THE PLANES OF SECTION EXAMINED.    Please call to discuss results / plan / schedule:   Thin (superficial) BCC confirmed. Appears mostly treated with biopsy itself. However, I recommend further treatment to ensure completely resolved. Options incl EDC or Aldara course (AAA qhs M-F x 6 weeks, increasing to daily if no to minimal inflammation, decreasing to MWF if robust / intolerable inflammation to make goal duration). Please  / pend rx if applicable / schedule 3 month f/u +/- procedure appt ) ty

## 2025-06-26 ENCOUNTER — PATIENT MESSAGE (OUTPATIENT)
Facility: CLINIC | Age: 65
End: 2025-06-26
Payer: MEDICARE

## 2025-06-26 RX ORDER — IMIQUIMOD 12.5 MG/.25G
CREAM TOPICAL
Qty: 24 PACKET | Refills: 1 | Status: SHIPPED | OUTPATIENT
Start: 2025-06-27

## 2025-07-02 ENCOUNTER — OFFICE VISIT (OUTPATIENT)
Dept: PODIATRY | Facility: CLINIC | Age: 65
End: 2025-07-02
Payer: MEDICARE

## 2025-07-02 VITALS
SYSTOLIC BLOOD PRESSURE: 133 MMHG | HEIGHT: 75 IN | DIASTOLIC BLOOD PRESSURE: 79 MMHG | BODY MASS INDEX: 28.6 KG/M2 | RESPIRATION RATE: 18 BRPM | WEIGHT: 230 LBS | HEART RATE: 70 BPM

## 2025-07-02 DIAGNOSIS — M21.6X2 PRONATION DEFORMITY OF BOTH FEET: ICD-10-CM

## 2025-07-02 DIAGNOSIS — G57.92 PERIPHERAL NEURITIS OF LEFT FOOT: Primary | ICD-10-CM

## 2025-07-02 DIAGNOSIS — M21.6X1 PRONATION DEFORMITY OF BOTH FEET: ICD-10-CM

## 2025-07-02 DIAGNOSIS — M20.12 HALLUX ABDUCTO VALGUS, BILATERAL: ICD-10-CM

## 2025-07-02 DIAGNOSIS — Z87.81 HISTORY OF FRACTURE OF FOOT: ICD-10-CM

## 2025-07-02 DIAGNOSIS — M20.11 HALLUX ABDUCTO VALGUS, BILATERAL: ICD-10-CM

## 2025-07-02 DIAGNOSIS — R20.0 NUMBNESS OF LEFT FOOT: ICD-10-CM

## 2025-07-02 PROCEDURE — 99999 PR PBB SHADOW E&M-EST. PATIENT-LVL IV: CPT | Mod: PBBFAC,,, | Performed by: PODIATRIST

## 2025-07-02 PROCEDURE — 99213 OFFICE O/P EST LOW 20 MIN: CPT | Mod: S$PBB,,, | Performed by: PODIATRIST

## 2025-07-02 PROCEDURE — 99214 OFFICE O/P EST MOD 30 MIN: CPT | Mod: PBBFAC | Performed by: PODIATRIST

## 2025-07-04 NOTE — PROGRESS NOTES
Subjective:       Patient ID: Osbaldo Brewer is a 64 y.o. male.    Chief Complaint: Follow-up and Foot Injury (Left foot)  Patient presents for follow-up neuritis/numbness involving the medial plantar and peroneal nerve of the left foot, , chronic symptoms means from an injury April 2024, surgery to fixate 5th metatarsal fracture December 2024  Has not started physical therapy yet, waiting on insurance approval  Patient relates improvement with IM steroid and IM Toradol injection.  Has reduced some swelling, resolved pain and using Voltaren gel every night.  Relates mostly numbness in the middle and bottom of the foot leading to the 1st 2nd and sometimes the 3rd digit  Numbness affects his walking  Relates he has tolerated arch support well, has been comfortable and wears at all times.  Has tennis shoes on indoors and out  Relates discomfort and numbness today, not really pain    Past Medical History:   Diagnosis Date    Basal cell carcinoma     BPH associated with nocturia 01/11/2023    Erectile dysfunction 01/11/2023    History of Helicobacter pylori infection 01/11/2023    History of sinus surgery 01/11/2023    Mixed hyperlipidemia 08/29/2014     Past Surgical History:   Procedure Laterality Date    FUNCTIONAL ENDOSCOPIC SINUS SURGERY (FESS) USING COMPUTER-ASSISTED NAVIGATION Bilateral 3/31/2023    Procedure: SINUS SURGERY FUNCTIONAL ENDOSCOPIC WITH NAVIGATION;  Surgeon: Neville Navarrete MD;  Location: [facility] OR;  Service: ENT;  Laterality: Bilateral;    NASAL SEPTOPLASTY Bilateral 3/31/2023    Procedure: SEPTOPLASTY;  Surgeon: Neville Navarrete MD;  Location: [facility] OR;  Service: ENT;  Laterality: Bilateral;    OPEN REDUCTION AND INTERNAL FIXATION (ORIF) OF FRACTURE OF METATARSAL BONE Left 12/4/2024    Procedure: ORIF, FRACTURE, METATARSAL BONE;  Surgeon: Milo Ramirez MD;  Location: [facility] OR;  Service: Orthopedics;  Laterality: Left;  5TH METATARSAL NONUNION TAKEDOWN    SINUS SURGERY      WISDOM TOOTH  "EXTRACTION      states woke up during procedure     No family history on file.  Social History     Socioeconomic History    Marital status:    Tobacco Use    Smoking status: Never     Passive exposure: Never    Smokeless tobacco: Never   Substance and Sexual Activity    Alcohol use: Yes     Comment: occ    Drug use: Never    Sexual activity: Yes     Social Drivers of Health     Financial Resource Strain: Low Risk  (6/19/2025)    Overall Financial Resource Strain (CARDIA)     Difficulty of Paying Living Expenses: Not hard at all   Food Insecurity: No Food Insecurity (6/19/2025)    Hunger Vital Sign     Worried About Running Out of Food in the Last Year: Never true     Ran Out of Food in the Last Year: Never true   Transportation Needs: No Transportation Needs (6/19/2025)    PRAPARE - Transportation     Lack of Transportation (Medical): No     Lack of Transportation (Non-Medical): No   Physical Activity: Unknown (6/19/2025)    Exercise Vital Sign     Days of Exercise per Week: 0 days   Stress: Stress Concern Present (6/19/2025)    [place] [employer] - Occupational Stress Questionnaire     Feeling of Stress : To some extent   Housing Stability: Low Risk  (6/19/2025)    Housing Stability Vital Sign     Unable to Pay for Housing in the Last Year: No     Homeless in the Last Year: No       Current Medications[1]  Review of patient's allergies indicates:   Allergen Reactions    Oxycodone-acetaminophen Rash       Review of Systems   All other systems reviewed and are negative.      Objective:      Vitals:    07/02/25 0953   BP: 133/79   Pulse: 70   Resp: 18   Weight: 104.3 kg (230 lb)   Height: 6' 3" (1.905 m)     Physical Exam  Vitals and nursing note reviewed.   Constitutional:       General: He is not in acute distress.     Appearance: Normal appearance.   Cardiovascular:      Pulses:           Dorsalis pedis pulses are 2+ on the right side and 2+ on the left side.        Posterior tibial " pulses are 2+ on the right side and 2+ on the left side.      Heart sounds:      Gallop: Paresthesias left foot.   Musculoskeletal:         General: Tenderness and deformity present. No swelling.      Right foot: Decreased range of motion. Deformity and bunion present.      Left foot: Decreased range of motion (Limited midfoot and forefoot range of motion). Deformity and bunion present.      Comments: discomfort along the medial plantar nerve and upon compression deep peroneal nerve left foot without edema   Feet:      Right foot:      Skin integrity: Skin integrity normal.      Left foot:      Skin integrity: Skin integrity normal.   Skin:     Capillary Refill: Capillary refill takes less than 2 seconds.   Neurological:      General: No focal deficit present.      Mental Status: He is alert.   Psychiatric:         Thought Content: Thought content normal.         EXAMINATION:  XR FOOT COMPLETE 3 VIEW LEFT  CLINICAL HISTORY:  Displaced fracture of fifth metatarsal bone, left foot, initial encounter for closed fracture   COMPARISON:  March 18, 2025     FINDINGS:  A sideplate and multiple screws bridge the prior 5th metatarsal shaft fracture site.  There is joint space narrowing at the 1st metatarsophalangeal joint.  Moderate midfoot degenerative changes are noted and there is a degenerative type plantar calcaneal spur.  There is a hallux valgus deformity.     Impression:  Prior ORIF of a 5th metatarsal shaft fracture without apparent complication.  Underlying a hallux valgus deformity with forefoot and midfoot degenerative changes.     Electronically signed by:Eddi Hyde MD  Date:                                            04/29/2025                              Assessment:       1. Peripheral neuritis of left foot    2. Numbness of left foot    3. Pronation deformity of both feet    4. Hallux abducto valgus, bilateral    5. History of fracture of foot - Left Foot          Plan:           Advised patient it is a  good sign he has had reduced swelling and resolved most of his pain, however needs to continue topical treatment daily to treat any daily inflammation secondary to the amount of walking he does every day  Voltaren gel should be applied at least twice daily for best results  Advised patient to massage deep into the arch to the bottom of the 1st and 2nd digits  Advised patient foot type and structure contribute, he has pronation and bunion deformity both which puts additional pressure through the medial column/the inside of his foot in the area he is having pain.  Has previous history of injury and fracture in the length of time he burred weight on this foot up until his surgery also contributes due to compensating and potential damage of the nerves  Continue arch supports in tennis shoes at all times  Explained any improvement regarding treatment of these nerves and symptoms of numbness could take several months as chronic damage to the nerves takes a long time to improve  Advised patient we should know within 3 weeks of his physical therapy if these treatments are helping this condition  If he does not have notable improvement in 3 weeks of PT recommend he contact the office at our next step would be a CT of his foot  Patient was in understanding and agreement with treatment plan.  I counseled the patient on their conditions, implications and medical management.  Instructed patient to contact the office with any changes, questions, concerns, worsening of symptoms.   Total face to face time 20 minutes, exam, assessment, treatment, discussion, additional time for review of chart prior to and following appointment and visit documentation, consultation and coordination of care.    Follow up after 3 weeks of physical therapy    This note was created using [employer] voice recognition software that occasionally misinterpreted phrases or words.             [1]   Current Outpatient Medications   Medication Sig Dispense Refill     atorvastatin (LIPITOR) 40 MG tablet Take 1 tablet (40 mg total) by mouth once daily. 90 tablet 3    azelastine (ASTELIN) 137 mcg (0.1 %) nasal spray 2 sprays (274 mcg total) by Nasal route 2 (two) times daily. 30 mL 6    COQ10, UBIQUINOL, ORAL Take by mouth.      famotidine (PEPCID) 40 MG tablet Take 1 tablet (40 mg total) by mouth 2 (two) times daily. 30 tablet 11    fluorouraciL (EFUDEX) 5 % cream AAA face bid x 2-4 weeks 40 g 1    imiquimod (ALDARA) 5 % cream AAA nightly, Monday through Friday for 6 weeks.  Titrate as directed. 24 packet 1    ipratropium (ATROVENT) 42 mcg (0.06 %) nasal spray 2 sprays by Each Nostril route 3 (three) times daily as needed (runny nose (rhinitis)). 15 mL 11    ketoconazole (NIZORAL) 2 % cream Apply twice a day to affected areas of skin 60 g 2    meloxicam (MOBIC) 15 MG tablet Take 1 tablet (15 mg total) by mouth once daily. 30 tablet 1    tamsulosin (FLOMAX) 0.4 mg Cap TAKE 1 CAPSULE(0.4 MG) BY MOUTH DAILY 90 capsule 3     No current facility-administered medications for this visit.

## 2025-07-10 ENCOUNTER — CLINICAL SUPPORT (OUTPATIENT)
Dept: REHABILITATION | Facility: HOSPITAL | Age: 65
End: 2025-07-10
Payer: MEDICARE

## 2025-07-10 DIAGNOSIS — Z74.09 IMPAIRED FUNCTIONAL MOBILITY, BALANCE, GAIT, AND ENDURANCE: Primary | ICD-10-CM

## 2025-07-10 PROBLEM — R26.2 DIFFICULTY WALKING: Status: RESOLVED | Noted: 2025-01-17 | Resolved: 2025-07-10

## 2025-07-10 PROCEDURE — 97530 THERAPEUTIC ACTIVITIES: CPT

## 2025-07-10 PROCEDURE — 97161 PT EVAL LOW COMPLEX 20 MIN: CPT

## 2025-07-10 PROCEDURE — 97112 NEUROMUSCULAR REEDUCATION: CPT

## 2025-07-10 NOTE — PROGRESS NOTES
Outpatient Rehab    Physical Therapy Evaluation    Patient Name: Osbaldo Brewer  MRN: [id number]  YOB: 1960  Encounter Date: 7/10/2025    Therapy Diagnosis:   Encounter Diagnosis   Name Primary?    Impaired functional mobility, balance, gait, and endurance Yes     Physician: Vick Sanchez,*    Physician Orders: Eval and Treat  Medical Diagnosis: Peripheral neuritis of left foot  Edema of left foot  Pronation deformity of both feet  Surgical Diagnosis: Not applicable for this Episode   Surgical Date: Not applicable for this Episode  Days Since Last Surgery: Not applicable for this Episode    Visit # / Visits Authorized:  1 / 1  Insurance Authorization Period: 6/18/2025 to 6/18/2026  Date of Evaluation: 7/10/2025  Plan of Care Certification: 7/10/2025 to 9/4/2025     Time In: 1330   Time Out: 1430  Total Time (in minutes): 60   Total Billable Time (in minutes):      Intake Outcome Measure for FOTO Survey    Therapist reviewed FOTO scores for Osbaldo Brewer on 7/10/2025.   FOTO report - see Media section or FOTO account episode details.     Intake Score:  %    Precautions:       Subjective   History of Present Illness  Osbaldo is a 64 y.o. male who reports to physical therapy with a chief concern of The patient 64 year old male presenting with L foot pain/swelling s/p ORIF following trip and fall. Patient reports edema at the end of the day, difficulty walking, impaired balance, impaired standing capacity, and impaired ADLs secondary to foot pain, weakness, and impaired gait..     The patient reports a medical diagnosis of L ORIF December 2024.                 Pain     Patient reports a current pain level of 2/10. Pain at best is reported as 2/10. Pain at worst is reported as 7/10.   Location: L foot  Clinical Progression (since onset): Stable  Pain Qualities: Discomfort, Tenderness, Tightness, Throbbing, Pressure, Aching  Pain-Relieving Factors: Activity modification,  Elevation, Ice  Pain-Aggravating Factors: Walking, Squatting, Standing, Stair climbing, Exercise, Movement         Employment  Employment Status: Employed full-time          Past Medical History/Physical Systems Review:   Osbaldo Brewer  has a past medical history of Basal cell carcinoma, BPH associated with nocturia, Erectile dysfunction, History of Helicobacter pylori infection, History of sinus surgery, and Mixed hyperlipidemia.    Osbaldo Brewer  has a past surgical history that includes Sinus surgery; [place] tooth extraction; Functional endoscopic sinus surgery (FESS) using computer-assisted navigation (Bilateral, 3/31/2023); Nasal septoplasty (Bilateral, 3/31/2023); and Open reduction and internal fixation (ORIF) of fracture of metatarsal bone (Left, 12/4/2024).    Osbaldo has a current medication list which includes the following prescription(s): atorvastatin, azelastine, ubiquinol, famotidine, fluorouracil, imiquimod, ipratropium, ketoconazole, meloxicam, and tamsulosin.    Review of patient's allergies indicates:   Allergen Reactions    Oxycodone-acetaminophen Rash        Objective   Bracing      Supportive arch insoles both shoes.         Ankle/Foot Observations  Left Ankle/Foot Observations  Present: Edema, Effusion, and Incision     L hallux valgus and digits 2-5 elevated compared to R.       Lower Extremity Sensation  General Lumbar/Lower Extremity Sensation  Intact: Right and Left  Right Lumbar/Lower Extremity Sensation  Intact: Light Touch, Sharp/Dull Discrimination, Static Two Point Discrimination, Dynamic Two Point Discrimination, Kinesthesia, and Proprioception  Right Lumbar/Lower Extremity Sensation Stocking Glove Pattern: No    Left Lumbar/Lower Extremity Sensation  Intact: Light Touch, Static Two Point Discrimination, Dynamic Two Point Discrimination, Sharp/Dull Discrimination, Kinesthesia, and Proprioception  Left Lumbar/Lower Extremity Sensation Stocking Glove Pattern: No               Ankle/Foot Range of Motion   Right Ankle/Foot   Active (deg) Passive (deg) Pain   Dorsiflexion (KE) 10 15     Dorsiflexion (KF) 15 20     Plantar Flexion 50 60     Ankle Inversion 30 36     Ankle Eversion 10 12     Subtalar Inversion         Subtalar Eversion         Great Toe MTP Flexion         Great Toe MTP Extension 80 80     Great Toe IP Flexion             Left Ankle/Foot   Active (deg) Passive (deg) Pain   Dorsiflexion (KE) 6 8     Dorsiflexion (KF) 8 8     Plantar Flexion 34 36     Ankle Inversion 16 16     Ankle Eversion 4 6     Subtalar Inversion         Subtalar Eversion         Great Toe MTP Flexion         Great Toe MTP Extension 48 50     Great Toe IP Flexion                            Ankle/Foot Strength - Planes of Motion   Right Strength Right Pain Left Strength Left  Pain   Dorsiflexion (L4) 5   3     Plantar Flexion (S1) 5   3     Inversion 5   3     Eversion 5   3     Great Toe Flexion 5   3     Great Toe Extension (L5) 5   3     Lesser Toes Flexion 5   3     Lesser Toes Extension 5   3            Ankle/Foot Joint Mobility  Right Ankle/Foot Joint Mobility  Normal: Talocrural Joint, Subtalar Joint, Midfoot, and Forefoot  Left Ankle/Foot Joint Mobility  Hypomobile: Talocrural Joint, Subtalar Joint, Midfoot, and Forefoot              Four Stage Balance Test  Narrow Base of Support: 10 sec sec  Tandem Stand - Right Foot in Front: 10 sec  Tandem Stand - Left Foot in Front: 10 sec  Semi-Tandem Stand - Right Foot in Front: 10 sec  Semi-Tandem Stand - Left Foot in Front: 10 sec  Single Leg Stand - Right Foot: 8 sec  Single Leg Stand - Left Foot: 3 sec       Ambulation Details  Ambulation distance was 250 meters. L foot pain and limp noted.     Gait Analysis  Gait Pattern: Antalgic                   Treatment:  Balance/Neuromuscular Re-Education  NMR 1: Airex tandem 3 x 15 seconds  NMR 2: Tandem head rotations 2 x 6  NMR 3: BOSU 3 x 15 seconds mini squat  NMR 4: CKC ankle DF stretch on stairs 2 x  10  Therapeutic Activity  TA 1: Patient education regarding HEP.  TA 2: Patient education regarding symptom managment.  TA 3: Patient education regarding edema management.    Time Entry(in minutes):  PT Evaluation (Low) Time Entry: 20  Neuromuscular Re-Education Time Entry: 20  Therapeutic Activity Time Entry: 20    Assessment & Plan   Assessment  Osbaldo presents with a condition of Low complexity.   Presentation of Symptoms: Stable  Will Comorbidities Impact Care: No       Functional Limitations: Activity tolerance, Completing self-care activities, Proprioception, Range of motion, Participating in leisure activities, Pain with ADLs/IADLs, Gross motor coordination, Painful locomotion/ambulation, Maintaining balance, Decreased ambulation distance/endurance, Functional mobility  Impairments: Pain with functional activity, Impaired physical strength  Personal Factors Affecting Prognosis: Schedule    Patient Goal for Therapy (PT): Return to work without limitations in gait and balance.  Prognosis: Good  Assessment Details: Patient demonstrates deficits with range of motion, strength, and function that limit ability to participate in school, work, and recreational activities. They would benefit from skilled PT services to normalize kinetic chain mobility, strength, and function to safely return to their prior level of activity.    Plan  From a physical therapy perspective, the patient would benefit from: Skilled Rehab Services    Planned therapy interventions include: Therapeutic exercise, Therapeutic activities, Neuromuscular re-education, Manual therapy, ADLs/IADLs, Other (Comment), and Gait training.    Planned modalities to include: Biofeedback, Electrical stimulation - attended, Electrical stimulation - passive/unattended, Thermotherapy (hot pack), and Cryotherapy (cold pack).        Visit Frequency: 2 times Per Week for 8 Weeks.       This plan was discussed with Patient.   Discussion participants: Agreed Upon Plan  of Care  Plan details: Frequency and duration of treatment to be adjusted as needed          The patient's spiritual, cultural, and educational needs were considered, and the patient is agreeable to the plan of care and goals.           Goals:   Active       LTGs 8 weeks       Patient to improve pain free ambulation to 0.5 miles       Start:  07/10/25    Expected End:  09/04/25            Patient to improve L LE strength to 4/5       Start:  07/10/25    Expected End:  09/04/25            Patient to improve L SLS to 10 seconds       Start:  07/10/25    Expected End:  09/04/25            Patient to negotiate 30 stairs without limitations.        Start:  07/10/25    Expected End:  09/04/25               STGs 3 weeks       Patient to demonstrate knowledge and understanding of HEP.        Start:  07/10/25    Expected End:  09/04/25            Patient to improve L ankle/foot ROM by 10%       Start:  07/10/25    Expected End:  09/04/25            Patient to improve L foot/ankle strength by 1/2 grade       Start:  07/10/25    Expected End:  09/04/25            Patient to improve L SLS to 5 seconds       Start:  07/10/25    Expected End:  09/04/25                Alex Altman PT

## 2025-07-15 ENCOUNTER — CLINICAL SUPPORT (OUTPATIENT)
Dept: REHABILITATION | Facility: HOSPITAL | Age: 65
End: 2025-07-15
Payer: MEDICARE

## 2025-07-15 DIAGNOSIS — M25.512 ACUTE PAIN OF LEFT SHOULDER: ICD-10-CM

## 2025-07-15 DIAGNOSIS — R52 PAIN AGGRAVATED BY ACTIVITIES OF DAILY LIVING: ICD-10-CM

## 2025-07-15 DIAGNOSIS — M25.612 DECREASED RANGE OF MOTION OF LEFT SHOULDER: Primary | ICD-10-CM

## 2025-07-15 PROCEDURE — 97110 THERAPEUTIC EXERCISES: CPT

## 2025-07-15 PROCEDURE — 97140 MANUAL THERAPY 1/> REGIONS: CPT

## 2025-07-15 NOTE — PROGRESS NOTES
"  Outpatient Rehab    Occupational Therapy Progress Note : Updated Plan of Care    Patient Name: Osbaldo Brewer  MRN: [id number]  YOB: 1960  Encounter Date: 7/15/2025    Therapy Diagnosis:   Encounter Diagnoses   Name Primary?    Decreased range of motion of left shoulder Yes    Pain aggravated by activities of daily living     Acute pain of left shoulder      Physician: Vick Sanchez,*    Physician Orders: Eval and Treat  Medical Diagnosis: Left shoulder pain, unspecified chronicity  Surgical Diagnosis: Not applicable for this Episode   Surgical Date: Not applicable for this Episode  Days Since Last Surgery: Not applicable for this Episode    Visit # / Visits Authorized: 7 / 15  Insurance Authorization Period: 5/8/2025 to 12/31/2025  Date of Evaluation: 5/8/2025   Plan of Care Certification: 5/9/2025 to 7/4/2025      Time In: 0934   Time Out: 1017  Total Time (in minutes): 43   Total Billable Time (in minutes): 43    FOTO:  Intake Score: 36%  Survey Score 2: 63%  Survey Score 3: Not applicable for this Episode%    Precautions:       Subjective   "i think its better than it was a month ago. i still have pain in the same spot sometimes. i didnt hurt it the other day but i definitely felt a pull".         Objective      Shoulder Range of Motion  Left Shoulder   Active (deg) Passive (deg) Pain   Flexion 106       Extension 62       Scaption         ABduction 94       ADduction         Horizontal ABduction         Horizontal ADduction         External Rotation (Shoulder ABducted 0 degrees) 58       External Rotation (Shoulder ABducted 45 degrees)         External Rotation (Shoulder ABducted 90 degrees)         Internal Rotation (Shoulder ABducted 0 degrees)  (L4)       Internal Rotation (Shoulder ABducted 45 degrees)         Internal Rotation (Shoulder ABducted 90 degrees)                       Shoulder Strength - Planes of Motion   Right Strength Right Pain Left Strength Left  Pain "   Flexion     5     Extension     5     ABduction     4 Yes   ADduction           Horizontal ABduction           Horizontal ADduction           Internal Rotation 0°     4+     Internal Rotation 90°           External Rotation 0°           External Rotation 90°                            Treatment:  Therapeutic Exercise  TE 1: UBE x4 min forward x4 min back level 2  TE 2: AAROM 2# dowel flexion, horiz abd/add  TE 3: serratus punches 2# x20  TE 4: scap retracts (green band) x20  TE 5: external rotation/internal rotation orange theraband x20 each  TE 6: Pulleys x2 min FF, IR x10 reps  TE 7: cable shoulder extension 7# x20  Manual Therapy  MT 1: glenohumeral joint distractions x3 x30 sec hold each  MT 2: anterior to posterior glenohumeral joint mobilizations  MT 3: STM provided to L shoulder and surrounding musculature    Time Entry(in minutes):  Manual Therapy Time Entry: 10  Therapeutic Exercise Time Entry: 33    Assessment & Plan   Assessment  Patient would continue to benefit from skilled OT services. First visit in several weeks. ROM measures taken for UPOC. His motion is continuing to improve. All exercises tolerated well with minimal complaints of discomfort. Will progress as tolerable.   Evaluation/Treatment Tolerance: Patient tolerated treatment well    The patient will continue to benefit from skilled outpatient occupational therapy in order to address the deficits listed in the problem list on the initial evaluation, provide patient and family education, and maximize the patients level of independence in the home and community environments.     The patient's spiritual, cultural, and educational needs were considered, and the patient is agreeable to the plan of care and goals.          Goals:   Active       LTG       IND with HEP (Progressing)       Start:  05/09/25    Expected End:  09/09/25            Pt will demonstrate (left) shoulder AROM WNL grossly for [place] with UB drsg and overhead functional  reach by discharge.    (Progressing)       Start:  05/09/25    Expected End:  09/09/25            Pt will report 1/10 pain in (L)shoulder at worst with functional mobility and overhead use by dc.    (Progressing)       Start:  05/09/25    Expected End:  09/09/25            Pt will demonstrate (left) shoulder MMT WNL grossly for [place] with functional activities   (Progressing)       Start:  05/09/25    Expected End:  09/09/25            Patient will be able to achieve less than or equal to 25% on the FOTO, demonstrating overall improved functional ability with upper extremity.  (Self-care category)  (Progressing)       Start:  05/09/25    Expected End:  09/09/25              Resolved       STG       Initiate HEP (Met)       Start:  05/09/25    Expected End:  06/06/25    Resolved:  05/12/25          Pt will increase shoulder AROM by 10 degrees grossly for improved performance with overhead ADL's   (Met)       Start:  05/09/25    Expected End:  06/06/25    Resolved:  06/12/25         Pt will report 5/10 pain in (left)shoulder at worst with functional mobility and overhead use by 4 weeks.    (Met)       Start:  05/09/25    Expected End:  06/06/25    Resolved:  06/12/25         Pt will demonstrate increased MMT to 4-/5 grossly left shoulder for functional reach and carrying, moving, handling objects by 4 weeks.    (Met)       Start:  05/09/25    Expected End:  06/06/25    Resolved:  07/15/25         Patient will be able to achieve less than or equal to 50% on the FOTO, demonstrating overall improved functional ability with upper extremity.  (Self-care category)  (Met)       Start:  05/09/25    Expected End:  06/06/25    Resolved:  06/12/25             Apurva Mejia OT

## 2025-07-16 ENCOUNTER — OFFICE VISIT (OUTPATIENT)
Dept: OPTOMETRY | Facility: CLINIC | Age: 65
End: 2025-07-16
Payer: MEDICARE

## 2025-07-16 DIAGNOSIS — H25.13 NUCLEAR SCLEROSIS, BILATERAL: Primary | ICD-10-CM

## 2025-07-16 DIAGNOSIS — H11.002 PTERYGIUM EYE, LEFT: ICD-10-CM

## 2025-07-16 DIAGNOSIS — Z13.5 GLAUCOMA SCREENING: ICD-10-CM

## 2025-07-16 DIAGNOSIS — H52.4 BILATERAL PRESBYOPIA: ICD-10-CM

## 2025-07-16 DIAGNOSIS — H43.813 POSTERIOR VITREOUS DETACHMENT, BILATERAL: ICD-10-CM

## 2025-07-16 PROCEDURE — 99999 PR PBB SHADOW E&M-EST. PATIENT-LVL III: CPT | Mod: PBBFAC,,, | Performed by: OPTOMETRIST

## 2025-07-16 PROCEDURE — 92004 COMPRE OPH EXAM NEW PT 1/>: CPT | Mod: S$PBB,,, | Performed by: OPTOMETRIST

## 2025-07-16 PROCEDURE — 99213 OFFICE O/P EST LOW 20 MIN: CPT | Mod: PBBFAC,PO | Performed by: OPTOMETRIST

## 2025-07-16 RX ORDER — LOTEPREDNOL ETABONATE 5 MG/ML
1 SUSPENSION/ DROPS OPHTHALMIC 2 TIMES DAILY PRN
Qty: 5 ML | Refills: 1 | Status: SHIPPED | OUTPATIENT
Start: 2025-07-16 | End: 2026-07-16

## 2025-07-16 NOTE — PATIENT INSTRUCTIONS
"DRY EYES -- BURNING OR GLADYS SYMPTOMS:  Use Over The Counter artificial tears as needed for dry eye symptoms.   Some common brands include:  Systane, Optive, Refresh, and Thera-Tears.  These drops can be used as frequently as desired, but may be most helpful use during long periods of concentrated work.  For example, reading / working at the computer. Start with 3-4x per day.     Nighttime Ophthalmic gel or ointments are available: Refresh PM, Genteal, and Lacrilube.    Avoid drops that "get redness out" (Visine, Murine, Clear Eyes), as these may contain medication that could further irritate the eyes, especially with chronic use.    ALLERGY EYES -- ITCHING SYMPTOMS:  Over the counter medications include--Pataday, Zaditor, and Alaway.  Use as directed 1-2 drops daily for symptoms of itching / watering eyes.  These drops will not help for dry eye or exposure symptoms.    REDNESS RELIEF:  Lumify---is a good redness reliever that will not cause irritation if used chronically.        FLASHES / FLOATERS / POSTERIOR VITREOUS DETACHMENT    Call the clinic if you have any further changes in symptoms.  Including:  Increased numbers of floaters or flashing lights, dimness or darkness that moves through or stays constant in your vision, or any pain in the eye (s).    You may sometimes see small specks or clouds moving in your field of vision.  They are called FLOATERS.  You can often see them when looking at a plain background, like a blank wall or blue suleman.  Floaters are actually tiny clumps of gel or cells inside the VITREOUS, the clear jelly-like fluid that fills the inside of your eye.    While these objects look like they are in front of your eye, they are actually floating inside.  What you see are the shadows they cast on the RETINA, the nerve layer at the back of the eye that senses light and allows you to see.      POSTERIOR VITREOUS DETACHMENT    The appearance of new floaters may be alarming.  If you suddenly " develop new floaters, you should contact your eye care professional  right away.    The retina can tear if the shrinking vitreous pulls away from the wall of the eye.  This sometimes causes a small amount of bleeding in the eye that may appear as new floaters.    A torn retina is always a serious problem, since it can lead to a retinal detachment.  You should see your eye care professional as soon as possible if:    even one new floater appears suddenly;  you see sudden flashes of light;  you notice other symptoms, like the loss of side vision, or a curtain closes down in your vision        POSTERIOR VITREOUS DETACHMENT is more common for people who:    are nearsighted;  have had cataract surgery;  have had YAG laser surgery of the eye;  have had inflammation inside the eye;  are over age 60.      While some floaters may remain visible, many of them will fade over time and become less noticeable.  Even if you've had some floaters for years, you should have your eyes checked as soon as possible if you notice new ones.    FLASHING LIGHTS    When the vitreous gel rubs or pulls on the retina, you may see what look like flashing lights or lightning streaks.  These flashes can appear off and on for several weeks or months.      Some people experience flashes of light that appear as jagged lines or heat waves in both eyes, lasting 10-20 minutes.  These flashes are caused by a spasm of blood vessels in the brain, which is called a migraine.    If a headache follows these flashes, it's called a migraine headache.  If   no headache occurs, these flashes are called Ophthalmic or Ocular Migraine.           Early Cataracts--not visually significant for surgery consultation.    What Are Cataracts?  A clear lens in the eye focuses light. This lets the eye see images sharply. With age, the lens slowly becomes cloudy. The cloudy lens is a cataract. A cataract scatters light and makes it hard for the eye to focus. Cataracts often  form in both eyes. But one lens may cloud faster than the other.      The Aging of Your Lens    Your lens may cloud so slowly that you don`t notice any vision changes at first. But as the cataract gets worse, the eye has a harder time focusing. In early stages, glasses may help you see better. As the lens gets cloudier, your doctor may recommend surgery to restore your vision.

## 2025-07-16 NOTE — PROGRESS NOTES
HPI    Pt presents today as a new pt for a dfe.  Pts last ocular exam was a 2-3 yrs ago in [place].  Pt states dist [place] is getting a little worse and glare from oncoming   headlights are bothersome when driving at night.  Pt wears OTC +2.50 readers only.  Does not instill gtts.  Denies ocular pain/disc.  Denies F/F.        Last edited by Conchis Baltazar on 7/16/2025 10:53 AM.            Assessment /Plan     For exam results, see Encounter Report.    Nuclear sclerosis, bilateral    Posterior vitreous detachment, bilateral    Pterygium eye, left  -     loteprednol (LOTEMAX) 0.5 % ophthalmic suspension; Place 1 drop into the left eye 2 (two) times daily as needed (for pterygium inflammation).  Dispense: 5 mL; Refill: 1    Glaucoma screening    Bilateral presbyopia      Vis sig NS cataracts, OU. Not ready for consult, gave info, cautions driving especially at night. CE possible in 3-5 yrs   RD precautions given and reviewed. Patient knows to call/ message if any further changes in symptoms occur.  OS w/ longstanding pterygium --2-3 mm encroachment, not inflamed at visit --gave lotemax to use prn for control  UV protection / ATs when outdoors / boating   4.   Not suspect   5.   No Rx needed full time // ok continue with otc only for near     Discussed and educated patient on current findings /plan.  RTC 1 year, prn if any changes / issues                     
Yes

## 2025-07-17 ENCOUNTER — CLINICAL SUPPORT (OUTPATIENT)
Dept: REHABILITATION | Facility: HOSPITAL | Age: 65
End: 2025-07-17
Payer: MEDICARE

## 2025-07-17 DIAGNOSIS — R52 PAIN AGGRAVATED BY ACTIVITIES OF DAILY LIVING: ICD-10-CM

## 2025-07-17 DIAGNOSIS — M25.512 ACUTE PAIN OF LEFT SHOULDER: ICD-10-CM

## 2025-07-17 DIAGNOSIS — M25.612 DECREASED RANGE OF MOTION OF LEFT SHOULDER: Primary | ICD-10-CM

## 2025-07-17 PROCEDURE — 97110 THERAPEUTIC EXERCISES: CPT

## 2025-07-17 PROCEDURE — 97140 MANUAL THERAPY 1/> REGIONS: CPT

## 2025-07-17 NOTE — PROGRESS NOTES
Outpatient Rehab    Occupational Therapy Visit    Patient Name: Osbaldo Brewer  MRN: [id number]  YOB: 1960  Encounter Date: 7/17/2025    Therapy Diagnosis:   Encounter Diagnoses   Name Primary?    Decreased range of motion of left shoulder Yes    Pain aggravated by activities of daily living     Acute pain of left shoulder      Physician: Vick Sanchez,*    Physician Orders: Eval and Treat  Medical Diagnosis: Left shoulder pain, unspecified chronicity  Surgical Diagnosis: Not applicable for this Episode   Surgical Date: Not applicable for this Episode  Days Since Last Surgery: Not applicable for this Episode    Visit # / Visits Authorized: 8 / 15  Insurance Authorization Period: 5/8/2025 to 12/31/2025  Date of Evaluation: 5/8/2025  Plan of Care Certification: 7/15/2025 to 9/9/2025      Time In: 0200   Time Out: 0248  Total Time (in minutes): 48   Total Billable Time (in minutes): 48    FOTO:  Intake Score: 36%  Survey Score 2: 63%  Survey Score 3: Not applicable for this Episode%    Precautions:       Subjective             Objective            Treatment:  Therapeutic Exercise  TE 1: UBE x4 min forward x4 min back level 2  TE 2: AAROM 3# dowel flexion, horiz abd/add  TE 3: serratus punches 2# x20  TE 4: scap retracts (green band) x20  TE 5: external rotation/internal rotation orange theraband x20 each  TE 6: Pulleys x2 min FF, IR x10 reps  TE 7: cable shoulder extension 7# x20  Manual Therapy  MT 1: glenohumeral joint distractions x3 x30 sec hold each  MT 2: anterior to posterior glenohumeral joint mobilizations  MT 3: STM provided to L shoulder and surrounding musculature    Time Entry(in minutes):  Manual Therapy Time Entry: 12  Therapeutic Exercise Time Entry: 36    Assessment & Plan   Assessment: Patient would continue to benefit from skilled OT services. He is continuing to progress well and has good tolerance to exercises. Will continue to progress within patient  tolerance.  Evaluation/Treatment Tolerance: Patient tolerated treatment well    The patient will continue to benefit from skilled outpatient occupational therapy in order to address the deficits listed in the problem list on the initial evaluation, provide patient and family education, and maximize the patients level of independence in the home and community environments.     The patient's spiritual, cultural, and educational needs were considered, and the patient is agreeable to the plan of care and goals.           Plan: continue per latest plan of care    Goals:   Active       LTG       IND with HEP (Progressing)       Start:  05/09/25    Expected End:  09/09/25            Pt will demonstrate (left) shoulder AROM WNL grossly for [place] with UB drsg and overhead functional reach by discharge.    (Progressing)       Start:  05/09/25    Expected End:  09/09/25            Pt will report 1/10 pain in (L)shoulder at worst with functional mobility and overhead use by dc.    (Progressing)       Start:  05/09/25    Expected End:  09/09/25            Pt will demonstrate (left) shoulder MMT WNL grossly for [place] with functional activities   (Progressing)       Start:  05/09/25    Expected End:  09/09/25            Patient will be able to achieve less than or equal to 25% on the FOTO, demonstrating overall improved functional ability with upper extremity.  (Self-care category)  (Progressing)       Start:  05/09/25    Expected End:  09/09/25              Resolved       STG       Initiate HEP (Met)       Start:  05/09/25    Expected End:  06/06/25    Resolved:  05/12/25          Pt will increase shoulder AROM by 10 degrees grossly for improved performance with overhead ADL's   (Met)       Start:  05/09/25    Expected End:  06/06/25    Resolved:  06/12/25         Pt will report 5/10 pain in (left)shoulder at worst with functional mobility and overhead use by 4 weeks.    (Met)       Start:  05/09/25    Expected  End:  06/06/25    Resolved:  06/12/25         Pt will demonstrate increased MMT to 4-/5 grossly left shoulder for functional reach and carrying, moving, handling objects by 4 weeks.    (Met)       Start:  05/09/25    Expected End:  06/06/25    Resolved:  07/15/25         Patient will be able to achieve less than or equal to 50% on the FOTO, demonstrating overall improved functional ability with upper extremity.  (Self-care category)  (Met)       Start:  05/09/25    Expected End:  06/06/25    Resolved:  06/12/25             Apurva Mejia, OT

## 2025-07-18 ENCOUNTER — CLINICAL SUPPORT (OUTPATIENT)
Dept: REHABILITATION | Facility: HOSPITAL | Age: 65
End: 2025-07-18
Payer: MEDICARE

## 2025-07-18 ENCOUNTER — PATIENT MESSAGE (OUTPATIENT)
Facility: CLINIC | Age: 65
End: 2025-07-18
Payer: MEDICARE

## 2025-07-18 DIAGNOSIS — Z74.09 IMPAIRED FUNCTIONAL MOBILITY, BALANCE, GAIT, AND ENDURANCE: Primary | ICD-10-CM

## 2025-07-18 PROCEDURE — 97110 THERAPEUTIC EXERCISES: CPT

## 2025-07-18 PROCEDURE — 97112 NEUROMUSCULAR REEDUCATION: CPT

## 2025-07-18 NOTE — PROGRESS NOTES
Outpatient Rehab    Physical Therapy Visit    Patient Name: Osbaldo Brewer  MRN: [id number]  YOB: 1960  Encounter Date: 7/18/2025    Therapy Diagnosis:   Encounter Diagnosis   Name Primary?    Impaired functional mobility, balance, gait, and endurance Yes     Physician: Megan Cleary DPM    Physician Orders: Eval and Treat  Medical Diagnosis: Peripheral neuritis of left foot  Edema of left foot  Pronation deformity of both feet  Surgical Diagnosis: Not applicable for this Episode   Surgical Date: Not applicable for this Episode  Days Since Last Surgery: Not applicable for this Episode    Visit # / Visits Authorized:  1 / 10  Insurance Authorization Period: 7/10/2025 to 12/31/2025  Date of Evaluation: 7/10/2025  Plan of Care Certification: 7/10/2025 to 9/4/2025      PT/PTA:     Number of PTA visits since last PT visit:   Time In: 1000   Time Out: 1100  Total Time (in minutes): 60   Total Billable Time (in minutes):      FOTO:  Intake Score (%): Not applicable for this Episode  Survey Score 2 (%): Not applicable for this Episode  Survey Score 3 (%): Not applicable for this Episode    Precautions:         Subjective   Reports turning his ankle yesterday and swelling remains in the evenings..         Objective            Treatment:  Therapeutic Exercise  TE 1: 1st MTP extension stretch  TE 2: 1st MTP flexion with BTB and PF  TE 3: Standing hip marches OTB 2 x 20  TE 4: Bike level 3 x 10 min  Balance/Neuromuscular Re-Education  NMR 1: Airex tandem 3 x 15 seconds  NMR 2: Tandem head rotations 2 x 6  NMR 3: BOSU 3 x 15 seconds mini squat  NMR 4: CKC ankle DF stretch on stairs 2 x 10  NMR 5: 2 x 20 large step taps fwd  NMR 6: 2 x 10 lateral step taps  Therapeutic Activity  TA 1: Patient education regarding HEP.  TA 3: Patient education regarding edema management.    Time Entry(in minutes):  Neuromuscular Re-Education Time Entry: 35  Therapeutic Exercise Time Entry: 20    Assessment & Plan    Assessment: 1st MTP mobility impaired at this time; Encouraged patient to incorporate interventions into HEP. Progress all strength and 1st ray stability moving forward as deficits remain at this time. Progress all functional stability as tolerated moving forward with treatment.        The patient will continue to benefit from skilled outpatient physical therapy in order to address the deficits listed in the problem list on the initial evaluation, provide patient and family education, and maximize the patients level of independence in the home and community environments.     The patient's spiritual, cultural, and educational needs were considered, and the patient is agreeable to the plan of care and goals.           Plan:      Goals:   Active       LTGs 8 weeks       Patient to improve pain free ambulation to 0.5 miles       Start:  07/10/25    Expected End:  09/04/25            Patient to improve L LE strength to 4/5       Start:  07/10/25    Expected End:  09/04/25            Patient to improve L SLS to 10 seconds       Start:  07/10/25    Expected End:  09/04/25            Patient to negotiate 30 stairs without limitations.        Start:  07/10/25    Expected End:  09/04/25               STGs 3 weeks       Patient to demonstrate knowledge and understanding of HEP.        Start:  07/10/25    Expected End:  09/04/25            Patient to improve L ankle/foot ROM by 10%       Start:  07/10/25    Expected End:  09/04/25            Patient to improve L foot/ankle strength by 1/2 grade       Start:  07/10/25    Expected End:  09/04/25            Patient to improve L SLS to 5 seconds       Start:  07/10/25    Expected End:  09/04/25                Alex Altman, PT

## 2025-07-25 ENCOUNTER — CLINICAL SUPPORT (OUTPATIENT)
Dept: REHABILITATION | Facility: HOSPITAL | Age: 65
End: 2025-07-25
Payer: MEDICARE

## 2025-07-25 DIAGNOSIS — Z74.09 IMPAIRED FUNCTIONAL MOBILITY, BALANCE, GAIT, AND ENDURANCE: Primary | ICD-10-CM

## 2025-07-25 PROCEDURE — 97112 NEUROMUSCULAR REEDUCATION: CPT

## 2025-07-25 PROCEDURE — 97110 THERAPEUTIC EXERCISES: CPT

## 2025-07-25 NOTE — PROGRESS NOTES
Outpatient Rehab    Physical Therapy Visit    Patient Name: Osbaldo Brewer  MRN: [id number]  YOB: 1960  Encounter Date: 7/25/2025    Therapy Diagnosis:   Encounter Diagnosis   Name Primary?    Impaired functional mobility, balance, gait, and endurance Yes     Physician: Megan Cleary DPM    Physician Orders: Eval and Treat  Medical Diagnosis: Peripheral neuritis of left foot  Edema of left foot  Pronation deformity of both feet  Surgical Diagnosis: Not applicable for this Episode   Surgical Date: Not applicable for this Episode  Days Since Last Surgery: Not applicable for this Episode    Visit # / Visits Authorized:  2 / 10  Insurance Authorization Period: 7/10/2025 to 12/31/2025  Date of Evaluation: 7/10/2025  Plan of Care Certification: 7/10/2025 to 9/4/2025      PT/PTA:     Number of PTA visits since last PT visit:   Time In: 0900   Time Out: 1000  Total Time (in minutes): 60   Total Billable Time (in minutes):      FOTO:  Intake Score (%): Not applicable for this Episode  Survey Score 2 (%): Not applicable for this Episode  Survey Score 3 (%): Not applicable for this Episode    Precautions:         Subjective   Denies adverse response following last treatment. Continues with ankle/foot soreness and edema..         Objective            Treatment:  Therapeutic Exercise  TE 1: 1st MTP extension stretch  TE 2: 1st MTP flexion with BTB and PF  TE 3: Standing hip marches OTB 2 x 20  TE 4: TM level 2.0 x 15 minutes cues to use Great toe  TE 5: Seated soleus raises with wedge 10# x 30  Balance/Neuromuscular Re-Education  NMR 1: Airex tandem 3 x 15 seconds  NMR 2: Tandem head rotations 2 x 6  NMR 3: BOSU 3 x 15 seconds mini squat  NMR 4: CKC ankle DF stretch on stairs 2 x 10  NMR 5: 2 x 20 large step taps fwd  NMR 6: 2 x 10 lateral step taps  NMR 7: Lunge balance with back bent toe 4 x 15 seconds each  Therapeutic Activity  TA 1: Patient education regarding HEP.  TA 3: Patient education  regarding edema management.    Time Entry(in minutes):  Neuromuscular Re-Education Time Entry: 15  Therapeutic Exercise Time Entry: 15    Assessment & Plan   Assessment: Progressed Great toe extension and isolated strength. Progress TC and 1st MTP mobility and strength moving forward as compensatory gait noted.        The patient will continue to benefit from skilled outpatient physical therapy in order to address the deficits listed in the problem list on the initial evaluation, provide patient and family education, and maximize the patients level of independence in the home and community environments.     The patient's spiritual, cultural, and educational needs were considered, and the patient is agreeable to the plan of care and goals.           Plan:      Goals:   Active       LTGs 8 weeks       Patient to improve pain free ambulation to 0.5 miles       Start:  07/10/25    Expected End:  09/04/25            Patient to improve L LE strength to 4/5       Start:  07/10/25    Expected End:  09/04/25            Patient to improve L SLS to 10 seconds       Start:  07/10/25    Expected End:  09/04/25            Patient to negotiate 30 stairs without limitations.        Start:  07/10/25    Expected End:  09/04/25               STGs 3 weeks       Patient to demonstrate knowledge and understanding of HEP.        Start:  07/10/25    Expected End:  09/04/25            Patient to improve L ankle/foot ROM by 10%       Start:  07/10/25    Expected End:  09/04/25            Patient to improve L foot/ankle strength by 1/2 grade       Start:  07/10/25    Expected End:  09/04/25            Patient to improve L SLS to 5 seconds       Start:  07/10/25    Expected End:  09/04/25                Alex Altman, PT

## 2025-07-29 ENCOUNTER — CLINICAL SUPPORT (OUTPATIENT)
Dept: REHABILITATION | Facility: HOSPITAL | Age: 65
End: 2025-07-29
Payer: MEDICARE

## 2025-07-29 DIAGNOSIS — Z74.09 IMPAIRED FUNCTIONAL MOBILITY, BALANCE, GAIT, AND ENDURANCE: Primary | ICD-10-CM

## 2025-07-29 PROCEDURE — 97110 THERAPEUTIC EXERCISES: CPT

## 2025-07-29 PROCEDURE — 97112 NEUROMUSCULAR REEDUCATION: CPT

## 2025-07-29 NOTE — PROGRESS NOTES
Outpatient Rehab    Physical Therapy Visit    Patient Name: Osbaldo Brewer  MRN: [id number]  YOB: 1960  Encounter Date: 7/29/2025    Therapy Diagnosis:   Encounter Diagnosis   Name Primary?    Impaired functional mobility, balance, gait, and endurance Yes     Physician: Megan Cleary DPM    Physician Orders: Eval and Treat  Medical Diagnosis: Peripheral neuritis of left foot  Edema of left foot  Pronation deformity of both feet  Surgical Diagnosis: Not applicable for this Episode   Surgical Date: Not applicable for this Episode  Days Since Last Surgery: Not applicable for this Episode    Visit # / Visits Authorized:  3 / 10  Insurance Authorization Period: 7/10/2025 to 12/31/2025  Date of Evaluation: 7/10/2025  Plan of Care Certification: 7/10/2025 to 9/4/2025      PT/PTA:     Number of PTA visits since last PT visit:   Time In: 0800   Time Out: 0855  Total Time (in minutes): 55   Total Billable Time (in minutes):      FOTO:  Intake Score (%): Not applicable for this Episode  Survey Score 2 (%): Not applicable for this Episode  Survey Score 3 (%): Not applicable for this Episode    Precautions:         Subjective   Pt has no new complaints.         Objective            Treatment:  Therapeutic Exercise  TE 1: 1st MTP extension stretch  TE 2: 1st MTP flexion with BTB and PF  TE 3: standing marches on buso ball each leg 15x  TE 4: TM level 2.4 x 15 minutes cues to use Great toe  TE 5: Seated soleus raises with wedge 10# x 30  Balance/Neuromuscular Re-Education  NMR 1: Airex tandem 3 x 15 seconds  NMR 3: BOSU 3 x 15 seconds mini squat  NMR 4: CKC ankle DF stretch on stairs 2 x 10  NMR 5: 2 x 20 large step taps fwd  NMR 6: 2 x 10 lateral step taps  NMR 7: Lunge balance with back bent toe 4 x 15 seconds each    Time Entry(in minutes):  Neuromuscular Re-Education Time Entry: 30  Therapeutic Exercise Time Entry: 30    Assessment & Plan   Assessment: Pt able to progress well with isolated motion  of great toe at this time. Pt still remains to need assist with balance training at this time.   Evaluation/Treatment Tolerance: Patient tolerated treatment well    The patient will continue to benefit from skilled outpatient physical therapy in order to address the deficits listed in the problem list on the initial evaluation, provide patient and family education, and maximize the patients level of independence in the home and community environments.     The patient's spiritual, cultural, and educational needs were considered, and the patient is agreeable to the plan of care and goals.           Plan:      Goals:   Active       LTGs 8 weeks       Patient to improve pain free ambulation to 0.5 miles       Start:  07/10/25    Expected End:  09/04/25            Patient to improve L LE strength to 4/5       Start:  07/10/25    Expected End:  09/04/25            Patient to improve L SLS to 10 seconds       Start:  07/10/25    Expected End:  09/04/25            Patient to negotiate 30 stairs without limitations.        Start:  07/10/25    Expected End:  09/04/25               STGs 3 weeks       Patient to demonstrate knowledge and understanding of HEP.        Start:  07/10/25    Expected End:  09/04/25            Patient to improve L ankle/foot ROM by 10%       Start:  07/10/25    Expected End:  09/04/25            Patient to improve L foot/ankle strength by 1/2 grade       Start:  07/10/25    Expected End:  09/04/25            Patient to improve L SLS to 5 seconds       Start:  07/10/25    Expected End:  09/04/25                Sonal Man, PT

## 2025-07-31 ENCOUNTER — CLINICAL SUPPORT (OUTPATIENT)
Dept: REHABILITATION | Facility: HOSPITAL | Age: 65
End: 2025-07-31
Payer: MEDICARE

## 2025-07-31 DIAGNOSIS — Z74.09 IMPAIRED FUNCTIONAL MOBILITY, BALANCE, GAIT, AND ENDURANCE: Primary | ICD-10-CM

## 2025-07-31 PROCEDURE — 97112 NEUROMUSCULAR REEDUCATION: CPT

## 2025-07-31 PROCEDURE — 97110 THERAPEUTIC EXERCISES: CPT

## 2025-07-31 NOTE — PROGRESS NOTES
Outpatient Rehab    Physical Therapy Visit    Patient Name: Osbaldo Brewer  MRN: [id number]  YOB: 1960  Encounter Date: 7/31/2025    Therapy Diagnosis:   Encounter Diagnosis   Name Primary?    Impaired functional mobility, balance, gait, and endurance Yes     Physician: Megan Cleary DPM    Physician Orders: Eval and Treat  Medical Diagnosis: Peripheral neuritis of left foot  Edema of left foot  Pronation deformity of both feet  Surgical Diagnosis: Not applicable for this Episode   Surgical Date: Not applicable for this Episode  Days Since Last Surgery: Not applicable for this Episode    Visit # / Visits Authorized:  4 / 10  Insurance Authorization Period: 7/10/2025 to 12/31/2025  Date of Evaluation: 7/10/2025  Plan of Care Certification: 7/10/2025 to 9/4/2025      PT/PTA:     Number of PTA visits since last PT visit:   Time In: 0900   Time Out: 0955  Total Time (in minutes): 55   Total Billable Time (in minutes):      FOTO:  Intake Score (%): Not applicable for this Episode  Survey Score 2 (%): Not applicable for this Episode  Survey Score 3 (%): Not applicable for this Episode    Precautions:         Subjective   Reports mild soreness following last treatment; ready for therapy upon arrival..         Objective            Treatment:  Therapeutic Exercise  TE 1: 1st MTP extension stretch  TE 2: 1st MTP flexion with BTB and PF  TE 3: standing marches on airex each leg 3 x 10 pink band  TE 4: TM level 2.4 x 15 minutes cues to use Great toe  TE 5: Seated soleus raises with wedge 10# x 30  Balance/Neuromuscular Re-Education  NMR 1: Airex tandem 3 x 15 seconds  NMR 3: BOSU 3 x 15 seconds mini squat  NMR 4: CKC ankle DF stretch on stairs 2 x 10  NMR 5: 2 x 20 large step taps fwd  NMR 6: 2 x 10 lateral step taps  NMR 7: Lunge balance with back bent toe 4 x 15 seconds each    Time Entry(in minutes):  Neuromuscular Re-Education Time Entry: 15  Therapeutic Exercise Time Entry: 15    Assessment &  Plan   Assessment: Progressed great toe strength and mobility without adverse response; restrictions remain at this time; progress hip strength and balance as tolerated moving forward with treatment.        The patient will continue to benefit from skilled outpatient physical therapy in order to address the deficits listed in the problem list on the initial evaluation, provide patient and family education, and maximize the patients level of independence in the home and community environments.     The patient's spiritual, cultural, and educational needs were considered, and the patient is agreeable to the plan of care and goals.           Plan:      Goals:   Active       LTGs 8 weeks       Patient to improve pain free ambulation to 0.5 miles       Start:  07/10/25    Expected End:  09/04/25            Patient to improve L LE strength to 4/5       Start:  07/10/25    Expected End:  09/04/25            Patient to improve L SLS to 10 seconds       Start:  07/10/25    Expected End:  09/04/25            Patient to negotiate 30 stairs without limitations.        Start:  07/10/25    Expected End:  09/04/25               STGs 3 weeks       Patient to demonstrate knowledge and understanding of HEP.        Start:  07/10/25    Expected End:  09/04/25            Patient to improve L ankle/foot ROM by 10%       Start:  07/10/25    Expected End:  09/04/25            Patient to improve L foot/ankle strength by 1/2 grade       Start:  07/10/25    Expected End:  09/04/25            Patient to improve L SLS to 5 seconds       Start:  07/10/25    Expected End:  09/04/25                Alex Altman, PT

## 2025-08-01 RX ORDER — FAMOTIDINE 40 MG/1
40 TABLET, FILM COATED ORAL 2 TIMES DAILY
Qty: 180 TABLET | Refills: 3 | Status: SHIPPED | OUTPATIENT
Start: 2025-08-01

## 2025-08-05 ENCOUNTER — CLINICAL SUPPORT (OUTPATIENT)
Dept: REHABILITATION | Facility: HOSPITAL | Age: 65
End: 2025-08-05
Payer: MEDICARE

## 2025-08-05 DIAGNOSIS — Z74.09 IMPAIRED FUNCTIONAL MOBILITY, BALANCE, GAIT, AND ENDURANCE: Primary | ICD-10-CM

## 2025-08-05 PROCEDURE — 97110 THERAPEUTIC EXERCISES: CPT

## 2025-08-05 PROCEDURE — 97112 NEUROMUSCULAR REEDUCATION: CPT

## 2025-08-05 NOTE — PROGRESS NOTES
Outpatient Rehab    Physical Therapy Visit    Patient Name: Osbaldo Brewer  MRN: [id number]  YOB: 1960  Encounter Date: 8/5/2025    Therapy Diagnosis:   Encounter Diagnosis   Name Primary?    Impaired functional mobility, balance, gait, and endurance Yes     Physician: Vick Sanchez,*    Physician Orders: Eval and Treat  Medical Diagnosis: Peripheral neuritis of left foot  Edema of left foot  Pronation deformity of both feet  Surgical Diagnosis: Not applicable for this Episode   Surgical Date: Not applicable for this Episode  Days Since Last Surgery: Not applicable for this Episode    Visit # / Visits Authorized:  5 / 10  Insurance Authorization Period: 7/10/2025 to 12/31/2025  Date of Evaluation: 7/10/2025  Plan of Care Certification: 7/10/2025 to 9/4/2025      PT/PTA:     Number of PTA visits since last PT visit:   Time In: 1100   Time Out: 1200  Total Time (in minutes): 60   Total Billable Time (in minutes):      FOTO:  Intake Score (%): Not applicable for this Episode  Survey Score 2 (%): Not applicable for this Episode  Survey Score 3 (%): Not applicable for this Episode    Precautions:         Subjective   Ankle soreness and stiffness in the afternoon/evenings after busy days; swelling remains in PM..         Objective            Treatment:  Therapeutic Exercise  TE 1: 1st MTP extension stretch Standing on wedge x 30  TE 2: 1st MTP flexion with BTB and PF  TE 3: standing marches on airex each leg 3 x 10 pink band  TE 4: TM level 2.4 x 15 minutes cues to use Great toe  TE 5: Seated soleus raises with wedge 10# x 30  Balance/Neuromuscular Re-Education  NMR 1: Airex tandem 3 x 15 seconds  NMR 2: Plantarflexion stretch standing x 25  NMR 4: CKC ankle DF stretch on stairs 2 x 10  NMR 5: 2 x 20 large step taps fwd  NMR 6: 2 x 10 lateral step taps  NMR 7: Lunge balance with back bent toe 4 x 15 seconds each    Time Entry(in minutes):  Neuromuscular Re-Education Time Entry:  15  Therapeutic Exercise Time Entry: 15    Assessment & Plan   Assessment: Good response with loading of 1st MTP; progress WB interventions to improve 1st ray WB and to normalize gait.        The patient will continue to benefit from skilled outpatient physical therapy in order to address the deficits listed in the problem list on the initial evaluation, provide patient and family education, and maximize the patients level of independence in the home and community environments.     The patient's spiritual, cultural, and educational needs were considered, and the patient is agreeable to the plan of care and goals.           Plan:      Goals:   Active       LTGs 8 weeks       Patient to improve pain free ambulation to 0.5 miles       Start:  07/10/25    Expected End:  09/04/25            Patient to improve L LE strength to 4/5       Start:  07/10/25    Expected End:  09/04/25            Patient to improve L SLS to 10 seconds       Start:  07/10/25    Expected End:  09/04/25            Patient to negotiate 30 stairs without limitations.        Start:  07/10/25    Expected End:  09/04/25               STGs 3 weeks       Patient to demonstrate knowledge and understanding of HEP.        Start:  07/10/25    Expected End:  09/04/25            Patient to improve L ankle/foot ROM by 10%       Start:  07/10/25    Expected End:  09/04/25            Patient to improve L foot/ankle strength by 1/2 grade       Start:  07/10/25    Expected End:  09/04/25            Patient to improve L SLS to 5 seconds       Start:  07/10/25    Expected End:  09/04/25                Alex Altman, PT

## 2025-08-11 ENCOUNTER — PATIENT MESSAGE (OUTPATIENT)
Dept: PODIATRY | Facility: CLINIC | Age: 65
End: 2025-08-11
Payer: MEDICARE

## 2025-08-19 ENCOUNTER — CLINICAL SUPPORT (OUTPATIENT)
Dept: REHABILITATION | Facility: HOSPITAL | Age: 65
End: 2025-08-19
Payer: MEDICARE

## 2025-08-19 DIAGNOSIS — Z74.09 IMPAIRED FUNCTIONAL MOBILITY, BALANCE, GAIT, AND ENDURANCE: Primary | ICD-10-CM

## 2025-08-19 PROCEDURE — 97112 NEUROMUSCULAR REEDUCATION: CPT

## 2025-08-19 PROCEDURE — 97110 THERAPEUTIC EXERCISES: CPT

## 2025-08-21 ENCOUNTER — TELEPHONE (OUTPATIENT)
Dept: PODIATRY | Facility: CLINIC | Age: 65
End: 2025-08-21
Payer: MEDICARE

## 2025-08-21 ENCOUNTER — PATIENT MESSAGE (OUTPATIENT)
Dept: PODIATRY | Facility: CLINIC | Age: 65
End: 2025-08-21
Payer: MEDICARE

## 2025-08-25 DIAGNOSIS — G57.92 NEURITIS OF LEFT FOOT: ICD-10-CM

## 2025-08-25 DIAGNOSIS — M79.672 CHRONIC PAIN IN LEFT FOOT: ICD-10-CM

## 2025-08-25 DIAGNOSIS — G89.29 CHRONIC PAIN IN LEFT FOOT: ICD-10-CM

## 2025-08-25 DIAGNOSIS — Z87.81 HISTORY OF FRACTURE OF FOOT: Primary | ICD-10-CM

## 2025-09-03 ENCOUNTER — HOSPITAL ENCOUNTER (OUTPATIENT)
Dept: RADIOLOGY | Facility: HOSPITAL | Age: 65
Discharge: HOME | End: 2025-09-03
Attending: PODIATRIST
Payer: MEDICARE

## 2025-09-03 DIAGNOSIS — Z87.81 HISTORY OF FRACTURE OF FOOT: ICD-10-CM

## 2025-09-03 DIAGNOSIS — G57.92 NEURITIS OF LEFT FOOT: ICD-10-CM

## 2025-09-03 DIAGNOSIS — G89.29 CHRONIC PAIN IN LEFT FOOT: ICD-10-CM

## 2025-09-03 DIAGNOSIS — M79.672 CHRONIC PAIN IN LEFT FOOT: ICD-10-CM

## 2025-09-03 PROCEDURE — 73718 MRI LOWER EXTREMITY W/O DYE: CPT | Mod: TC,LT

## 2025-09-03 PROCEDURE — 73718 MRI LOWER EXTREMITY W/O DYE: CPT | Mod: 26,LT,, | Performed by: RADIOLOGY

## 2025-09-04 ENCOUNTER — TELEPHONE (OUTPATIENT)
Dept: PODIATRY | Facility: CLINIC | Age: 65
End: 2025-09-04
Payer: MEDICARE

## (undated) DEVICE — K-WIRE TRCR PT1.25MM D 150MM L
Type: IMPLANTABLE DEVICE | Site: FOOT | Status: NON-FUNCTIONAL
Removed: 2024-12-04

## (undated) DEVICE — BLADE RAD 40 ROTATABLE 4MM

## (undated) DEVICE — Device

## (undated) DEVICE — STRAP OR TABLE 5IN X 72IN

## (undated) DEVICE — DRESSING XEROFORM NONADH 1X8IN

## (undated) DEVICE — KIT SAHARA DRAPE DRAW/LIFT

## (undated) DEVICE — DRAPE STERI U-SHAPED 47X51IN

## (undated) DEVICE — DRESSING TRANS 4X4 TEGADERM

## (undated) DEVICE — NDL HYPO 27G X 1 1/2

## (undated) DEVICE — SEE L#120831

## (undated) DEVICE — ALCOHOL 70% ISOP RUBBING 4OZ

## (undated) DEVICE — COVER PROXIMA MAYO STAND

## (undated) DEVICE — GLOVE SURGICAL LATEX SZ 7

## (undated) DEVICE — PAD CAST SPECIALIST STRL 4

## (undated) DEVICE — TUBING SUC UNIV W/CONN 12FT

## (undated) DEVICE — DRAPE T EXTRM SURG 121X128X90

## (undated) DEVICE — DRAPE THREE-QTR REINF 53X77IN

## (undated) DEVICE — TOWEL OR DISP STRL BLUE 4/PK

## (undated) DEVICE — WIRE OLIVE SMOOTH 1.3X60MM
Type: IMPLANTABLE DEVICE | Site: FOOT | Status: NON-FUNCTIONAL
Removed: 2024-12-04

## (undated) DEVICE — NDL SPINAL 25GX3.5 SPINOCAN

## (undated) DEVICE — NEPTUNE 4 PORT MANIFOLD

## (undated) DEVICE — GLOVE SENSICARE PI GRN 8

## (undated) DEVICE — PENCIL ROCKER SWITCH 10FT CORD

## (undated) DEVICE — HANDPIECE HYDRODEBRIDER

## (undated) DEVICE — BANDAGE MATRIX HK LOOP 6IN 5YD

## (undated) DEVICE — BLADE QUADCUT 3.4MMX13CM

## (undated) DEVICE — CONTAINER SPECIMEN OR STER 4OZ

## (undated) DEVICE — BLADE #15 STERILE CARBON

## (undated) DEVICE — ELECTRODE REM PLYHSV RETURN 9

## (undated) DEVICE — DRAPE U SPLIT SHEET 54X76IN

## (undated) DEVICE — SUT PLN GUT 4-0 SC-1SC-1 1

## (undated) DEVICE — BNDG COFLEX FOAM LF2 ST 4X5YD

## (undated) DEVICE — MARKER SKIN RULER STERILE

## (undated) DEVICE — CLIPPER BLADE MOD 4406 (CAREF)

## (undated) DEVICE — DRAPE EENT SPLIT STERILE

## (undated) DEVICE — KIT ANTIFOG

## (undated) DEVICE — SPONGE PATTY SURGICAL .5X3IN

## (undated) DEVICE — SYR 3CC LUER LOC

## (undated) DEVICE — SUT ETHILON 2-0 BLK MONO PS

## (undated) DEVICE — BANDAGE ESMARK 6X12

## (undated) DEVICE — SYR 10CC LUER LOCK

## (undated) DEVICE — SYR 50CC LL

## (undated) DEVICE — SPONGE GAUZE 4X4 12 PLY STRL

## (undated) DEVICE — DRAPE C-ARM MINI DISP

## (undated) DEVICE — 1.6 DRILL

## (undated) DEVICE — GLOVE SENSICARE PI ALOE 7.5

## (undated) DEVICE — DRAPE HALF SURGICAL 40X58IN

## (undated) DEVICE — APPLICATOR CHLORAPREP ORN 26ML

## (undated) DEVICE — TOURNIQUET SB QC DP 30X4IN

## (undated) DEVICE — DRESSING TELFA STRL 4X3 LF

## (undated) DEVICE — BRUSH SCRUB HIBICLENS 4%

## (undated) DEVICE — TUBING XPS IRRIG TO STRAIGHTSH